# Patient Record
Sex: FEMALE | Race: BLACK OR AFRICAN AMERICAN | NOT HISPANIC OR LATINO | Employment: FULL TIME | URBAN - METROPOLITAN AREA
[De-identification: names, ages, dates, MRNs, and addresses within clinical notes are randomized per-mention and may not be internally consistent; named-entity substitution may affect disease eponyms.]

---

## 2018-08-22 ENCOUNTER — TELEPHONE (OUTPATIENT)
Dept: SURGERY | Facility: CLINIC | Age: 63
End: 2018-08-22

## 2018-08-22 NOTE — TELEPHONE ENCOUNTER
Patient called regarding a second opinion for her breast cancer. Patient explains that she was recently diagnosed because she went to the ER with kidney stones, and her CT showed a breast mass. Subsequently went to [facility] for imaging and biopsy. She was found to have HER2 positive breast cancer. She states she met with a surgeon who told her she needed chemotherapy upfront. Patient does not want this. She states she is interested in hearing any other options available to her, including bilateral mastectomies up front. She is requesting Dr. Avendaño.     Scheduled patient with Dr. Avendaño for next Tuesday. She states she has her path report and her images on disc. She will bring them to the appt. Gave her my direct number and reviewed the location of the breast center. Patient verbalized understanding of all information

## 2018-08-28 ENCOUNTER — OFFICE VISIT (OUTPATIENT)
Dept: SURGERY | Facility: CLINIC | Age: 63
End: 2018-08-28
Payer: COMMERCIAL

## 2018-08-28 ENCOUNTER — DOCUMENTATION ONLY (OUTPATIENT)
Dept: SURGERY | Facility: CLINIC | Age: 63
End: 2018-08-28

## 2018-08-28 VITALS
BODY MASS INDEX: 39.99 KG/M2 | SYSTOLIC BLOOD PRESSURE: 156 MMHG | WEIGHT: 248.81 LBS | DIASTOLIC BLOOD PRESSURE: 92 MMHG | HEART RATE: 73 BPM | TEMPERATURE: 98 F | HEIGHT: 66 IN

## 2018-08-28 DIAGNOSIS — C50.812 MALIGNANT NEOPLASM OF OVERLAPPING SITES OF LEFT BREAST IN FEMALE, ESTROGEN RECEPTOR POSITIVE: Primary | ICD-10-CM

## 2018-08-28 DIAGNOSIS — C50.512 MALIGNANT NEOPLASM OF LOWER-OUTER QUADRANT OF LEFT BREAST OF FEMALE, ESTROGEN RECEPTOR POSITIVE: ICD-10-CM

## 2018-08-28 DIAGNOSIS — Z17.0 MALIGNANT NEOPLASM OF OVERLAPPING SITES OF LEFT BREAST IN FEMALE, ESTROGEN RECEPTOR POSITIVE: Primary | ICD-10-CM

## 2018-08-28 DIAGNOSIS — Z17.0 MALIGNANT NEOPLASM OF LOWER-OUTER QUADRANT OF LEFT BREAST OF FEMALE, ESTROGEN RECEPTOR POSITIVE: ICD-10-CM

## 2018-08-28 PROCEDURE — 99205 OFFICE O/P NEW HI 60 MIN: CPT | Mod: S$GLB,,, | Performed by: SURGERY

## 2018-08-28 PROCEDURE — 3008F BODY MASS INDEX DOCD: CPT | Mod: S$GLB,,, | Performed by: SURGERY

## 2018-08-28 PROCEDURE — 99999 PR PBB SHADOW E&M-EST. PATIENT-LVL III: CPT | Mod: PBBFAC,,, | Performed by: SURGERY

## 2018-08-28 NOTE — PROGRESS NOTES
GENERAL SURGERY CLINIC  HISTORY AND PHYSICAL    CC:  Left breast cancer     HPI:  Beatriz Faust is a 63 y.o.  female with no significant PMHx who presents to clinic for evaluation of newly diagnosed Left breast cancer.  She has not had a MMG in ~15 years.  She presented to the ED in early July for abdominal pain and a breast mass was incidentally found on CT scan.  She subsequently underwent a MMG/US and core needle bx which came back as high grade invasive ductal carcinoma ER/WV/Her2 +.  She was originally seen at [facility] where she was recommended to have neoadjuvant chemotherapy.  She presents today for a second opinion     No known family hx of breast cancer    Non-smoker, non-drinker    Gyn Hx:  Menarche 14, menopause ~47,       ROS:  A 10-point review of systems is negative except for the above mentioned in the HPI.     No past medical history on file.    No past surgical history on file.    Social History     Socioeconomic History    Marital status: Single     Spouse name: Not on file    Number of children: Not on file    Years of education: Not on file    Highest education level: Not on file   Social Needs    Financial resource strain: Not on file    Food insecurity - worry: Not on file    Food insecurity - inability: Not on file    Transportation needs - medical: Not on file    Transportation needs - non-medical: Not on file   Occupational History    Not on file   Tobacco Use    Smoking status: Never Smoker   Substance and Sexual Activity    Alcohol use: Not on file    Drug use: Not on file    Sexual activity: Not on file   Other Topics Concern    Not on file   Social History Narrative    Not on file       Review of patient's allergies indicates:  No Known Allergies      PHYSICAL EXAM:  Vitals:    18 1138   BP: (!) 156/92   Pulse: 73   Temp: 97.9 °F (36.6 °C)       General: NAD  Neuro: AAOx3  Cardio: RRR  Resp: Breathing even and unlabored  Abd: Soft, ND, NT, no  palpable mass, BS+  Ext: Warm and well perfused  Breast:  Well healing biopsy site on the Left.  No palpable masses on Right.  No LAD.        PATH  High grade invasive ductal carcinoma ER/MT+, Her2+      PERTINENT IMAGING:  Reviewed:  ~2x2.5 cm mass at 5:00 of Left breast ~5cm from nipple       ASSESSMENT/PLAN:  Beatriz Faust is a 63 y.o. female with triple positive Left breast cancer    - Patient is reluctant to undergo chemotherapy or radiation   - Would prefer bilateral mastectomy with reconstruction with plastic surgery       Kevon Hurd M.D.  General Surgery PGY3  [phone]  I have personally taken the history and examined this patient and agree with the resident's note as stated above.  Beatriz Faust presents for a newly diagnosed left breast cancer.    HISTORY OF PRESENT ILLNESS:  The patient presents with her sister Benjamin.    Initial workup was done at [facility] when a 2.5 cm mass   was seen on CT scan in the left breast lower outer 5 o'clock region.  Diagnostic   imaging revealed a 19 x 25 mm mass in the left breast.  Her last mammogram   prior to this was at [facility] in [place] approximately 16 years ago.  The core needle   biopsy was performed, which revealed a triple positive estrogen, positive   progesterone receptor positive, HER-2/fariba positive breast cancer.  She presents   today for second opinion.  She was counseled at [facility]   about neoadjuvant chemotherapy, which is certainly an option.  The patient   states that she does not want radiotherapy if she can avoid it.  She states she   does not want chemotherapy.  She states off hand that she wants a bilateral   mastectomy in order to not get chemotherapy.  We then went through an extensive   rationale as to why the choice of breast conservation surgery versus mastectomy   has no bearing on the influence of recommending chemotherapy either   neoadjuvantly or adjuvantly.  We discussed that the  recommendation for   chemotherapy would be that she has a high-risk tumor that is HER-2/fariba positive   and not based on what type of local surgery she had.  After extensive discussion   about this, they understand this process.  She states she is okay with hormonal   therapy and after the discussion, she is still hoping to avoid radiotherapy.    We did offer many options.  Since she has a 46 quadruple D breast, certainly it   would be difficult to perform lumpectomy and radiotherapy and offer a good   cosmetic result in terms of the radiotherapy because of the size of her breast.    We did offer her the option of left breast conservation surgery with bilateral   breast reductions done simultaneously.  This would likely allow this to achieve   an excellent surgically clear margin and avoid potential second surgery, which   the patient is trying to avoid, but would mandate radiotherapy.  If we did this   in the upfront setting, we will also place a contralateral right-sided port for   anticipated adjuvant Herceptin-based chemotherapy.  We discussed some of the   rationale now for adjuvant versus neoadjuvant chemotherapy with her two positive   cancers at our institution because of the APHINITY trial, which now allows us   to use Perjeta in the adjuvant setting.  We have been going to upfront surgery   if the tumor is estimated to be less than 3 cm because if she is node negative,   she would potentially end up receiving less chemotherapy and potential less side   effects rather than upfront approach where she would be given a more aggressive   regimen.  At this point, she may still be contemplating bilateral mastectomies,   but she is strongly now beginning to consider possible options of left breast   conservation surgery and radiotherapy with simultaneous reductions.  If this is   done, certainly another way to optimize surgically clear margins and avoid an   additional surgery would be to proceed with the  neoadjuvant approach.  She   states she did not have a left axillary ultrasound at [facility].  We will order a left axillary ultrasound.  I will also order a breast   MRI to assess the extent of the mass and if greater than 3 cm, certainly we   would lean and push her towards neoadjuvant Herceptin and Perjeta-based   chemotherapy approach.  I will order a PET scan at the patient's request, but   this will likely be denied since she has clinically negative axilla and a T2   tumor.    We have the outside imaging reviewed and there are indeterminate calcifications   associated with the left breast and the patient is scheduled for her left   axillary ultrasound on Tuesday 09/04/2018 and we will add a repeat left   diagnostic mammogram with possible additional views as well as a left breast   ultrasound to the existing left axillary ultrasound order.  The patient is still   contemplating therapeutic options, but seems to have had a good understanding   of the rationale for breast conservation surgery versus mastectomy and when   chemotherapy is indicated and when we would use it neoadjuvantly, we will await   final assessment with repeat imaging with MRI, repeat left diagnostic mammogram   and left breast and axillary ultrasound before making a final decision regarding   therapeutic approach.    Time spent with the patient and her sister today on 08/28/2018 at the time of   initial consultation was greater than 60 minutes with greater than 50% of that   time in counseling.    Again, if the MRI shows a larger area, neoadjuvant approach and downsizing to   optimize ability to do breast conservation surgery along with breast reduction   and radiotherapy would be an option to consider.      RLC/HN  dd: 09/02/2018 08:47:35 (CDT)  td: 09/02/2018 13:26:20 (CDT)  Doc ID   #[id number]  Job ID #[id number]    CC:     Job # [id number]

## 2018-08-28 NOTE — LETTER
[facility]  [address]  [facility] [address]  Phone: [phone]  Fax: [phone] September 2, 2018      Rey Roque MD  [address] [place] [id number]    Patient: Beatriz Faust   MR Number: [id number]   YOB: 1955   Date of Visit: 8/28/2018     Dear Dr. Roque:    Thank you for referring Beatriz Faust to me for evaluation. Attached you will find relevant portions of my assessment and plan of care.    The patient presents with her sister Benjamin.  Initial workup was done at [facility] when a 2.5 cm mass was seen on CT scan in the left breast lower outer 5 o'clock region.  Diagnostic imaging revealed a 19 x 25 mm mass in the left breast.  Her last mammogram prior to this was at [facility] in [place] approximately 16 years ago.  The core needle biopsy was performed, which revealed a triple positive estrogen, positive progesterone receptor positive, HER-2/fariba positive breast cancer.      She presents today for second opinion.  She was counseled at [facility] about neoadjuvant chemotherapy, which is certainly an option.  The patient states that she does not want radiotherapy if she can avoid it.  She states she does not want chemotherapy.  She states off hand that she wants a bilateral mastectomy in order to not get chemotherapy.  We then went through an extensive rationale as to why the choice of breast conservation surgery versus mastectomy has no bearing on the influence of recommending chemotherapy either neoadjuvantly or adjuvantly.  We discussed that the recommendation for chemotherapy would be that she has a high-risk tumor that is HER-2/fariba positive and not based on what type of local surgery she had.  After extensive discussion about this, they understand this process.  She states she is okay with hormonal therapy and after the discussion, she is still hoping to avoid radiotherapy.      We did offer many options.   Since she has a 46 quadruple D breast, certainly it would be difficult to perform lumpectomy and radiotherapy and offer a good cosmetic result in terms of the radiotherapy because of the size of her breast.  We did offer her the option of left breast conservation surgery with bilateral breast reductions done simultaneously.  This would likely allow this to achieve an excellent surgically clear margin and avoid potential second surgery, which the patient is trying to avoid, but would mandate radiotherapy.  If we did this in the upfront setting, we will also place a contralateral right-sided port for anticipated adjuvant Herceptin-based chemotherapy.  We discussed some of the rationale now for adjuvant versus neoadjuvant chemotherapy with her two positive cancers at our institution because of the APHINITY trial, which now allows us to use Perjeta in the adjuvant setting.  We have been going to upfront surgery if the tumor is estimated to be less than 3 cm because if she is node negative, she would potentially end up receiving less chemotherapy and potential less side effects rather than upfront approach where she would be given a more aggressive regimen.      At this point, she may still be contemplating bilateral mastectomies, but she is strongly now beginning to consider possible options of left breast conservation surgery and radiotherapy with simultaneous reductions. If this is done, certainly another way to optimize surgically clear margins and avoid an additional surgery would be to proceed with the neoadjuvant approach.  She states she did not have a left axillary ultrasound at [facility].  We will order a left axillary ultrasound.  I will also order a breast MRI to assess the extent of the mass and if greater than 3 cm, certainly we would lean and push her towards neoadjuvant Herceptin and Perjeta-based chemotherapy approach.  I will order a PET scan at the patient's request, but this will  likely be denied since she has clinically negative axilla and a T2 tumor.    We have the outside imaging reviewed and there are indeterminate calcifications associated with the left breast and the patient is scheduled for her left axillary ultrasound on Tuesday 09/04/2018 and we will add a repeat left diagnostic mammogram with possible additional views as well as a left breast ultrasound to the existing left axillary ultrasound order.  The patient is still contemplating therapeutic options, but seems to have had a good understanding of the rationale for breast conservation surgery versus mastectomy and when chemotherapy is indicated and when we would use it neoadjuvantly, we will await final assessment with repeat imaging with MRI, repeat left diagnostic mammogram and left breast and axillary ultrasound before making a final decision regarding therapeutic approach.    Time spent with the patient and her sister today on 08/28/2018 at the time of initial consultation was greater than 60 minutes with greater than 50% of that iime in counseling.    Again, if the MRI shows a larger area, neoadjuvant approach and downsizing to optimize ability to do breast conservation surgery along with breast reduction and radiotherapy would be an option to consider.    If you have questions, please do not hesitate to call me. I look forward to following Beatriz Faust along with you.    Sincerely,    Triston Avendaño MD  Medical Director, [facility]  Staff Attending Surgeon - Department of Surgery  [facility]  Associate Professor of Surgery  [employer]  [facility] [employer]    RLC/hcr

## 2018-08-28 NOTE — Clinical Note
September 2, 2018      Rey Roque MD  [address] [place] [id number]           [facility]  [address]  [facility] [address]  Phone: [phone]  Fax: [phone]          Patient: Beatriz Faust   MR Number: [id number]   YOB: 1955   Date of Visit: 8/28/2018       Dear Dr. Rey Roque:    Thank you for referring Beatriz Faust to me for evaluation. Attached you will find relevant portions of my assessment and plan of care.    If you have questions, please do not hesitate to call me. I look forward to following Beatriz Faust along with you.    Sincerely,    Triston Avendaño MD    Enclosure  CC:  No Recipients    If you would like to receive this communication electronically, please contact [email or web address] or [phone] to request more information on [employer] access.    For providers and/or their staff who would like to refer a patient to [facility], please contact us through our one-stop-shop provider referral line, [facility], at [phone].    If you feel you have received this communication in error or would no longer like to receive these types of communications, please e-mail [email or web address]

## 2018-08-29 ENCOUNTER — HOSPITAL ENCOUNTER (OUTPATIENT)
Dept: RADIOLOGY | Facility: HOSPITAL | Age: 63
Discharge: HOME | End: 2018-08-29
Attending: SURGERY

## 2018-08-31 NOTE — PROGRESS NOTES
Nurse Navigator Note:     Met with patient during her consult with Dr. Avendaño. Patient and I reviewed the information she discussed with Dr. Avendaño, including treatment options, diagnosis, and future plans for workup. Patient and I went through the new patient binder, explained some of the information and why it is provided.     Also offered patient consults with our other specialty clinics: Dr. Stokes for gynecological health during treatment, Charis Rojo for physical therapy evaluation, Dr. Schreiber for psychological support, and Yamel Dey for nutritional counseling. Explained to patient that all of these support services are completely optional. Discussed that physical therapy is the only service that is recommended pre-op specifically, everything else can be requested at a later time. Patient was given a copy of Dr. Avendaño's card and my card. Encouraged her to call me if she has any questions or concerns or would like to schedule any additional appointments. Verbalized understanding of all information.

## 2018-09-02 PROBLEM — C50.812 MALIGNANT NEOPLASM OF OVERLAPPING SITES OF LEFT BREAST IN FEMALE, ESTROGEN RECEPTOR POSITIVE: Status: ACTIVE | Noted: 2018-09-02

## 2018-09-02 PROBLEM — Z17.0 MALIGNANT NEOPLASM OF OVERLAPPING SITES OF LEFT BREAST IN FEMALE, ESTROGEN RECEPTOR POSITIVE: Status: ACTIVE | Noted: 2018-09-02

## 2018-09-04 ENCOUNTER — HOSPITAL ENCOUNTER (OUTPATIENT)
Dept: RADIOLOGY | Facility: HOSPITAL | Age: 63
Discharge: HOME | End: 2018-09-04
Attending: SURGERY
Payer: COMMERCIAL

## 2018-09-04 ENCOUNTER — TELEPHONE (OUTPATIENT)
Dept: SURGERY | Facility: CLINIC | Age: 63
End: 2018-09-04

## 2018-09-04 VITALS — HEIGHT: 66 IN | BODY MASS INDEX: 39.86 KG/M2 | WEIGHT: 248 LBS

## 2018-09-04 DIAGNOSIS — C50.812 MALIGNANT NEOPLASM OF OVERLAPPING SITES OF LEFT BREAST IN FEMALE, ESTROGEN RECEPTOR POSITIVE: Primary | ICD-10-CM

## 2018-09-04 DIAGNOSIS — Z17.0 MALIGNANT NEOPLASM OF OVERLAPPING SITES OF LEFT BREAST IN FEMALE, ESTROGEN RECEPTOR POSITIVE: Primary | ICD-10-CM

## 2018-09-04 DIAGNOSIS — C50.812 MALIGNANT NEOPLASM OF OVERLAPPING SITES OF LEFT BREAST IN FEMALE, ESTROGEN RECEPTOR POSITIVE: ICD-10-CM

## 2018-09-04 DIAGNOSIS — Z17.0 MALIGNANT NEOPLASM OF OVERLAPPING SITES OF LEFT BREAST IN FEMALE, ESTROGEN RECEPTOR POSITIVE: ICD-10-CM

## 2018-09-04 LAB
CREAT SERPL-MCNC: 0.8 MG/DL (ref 0.5–1.4)
SAMPLE: NORMAL

## 2018-09-04 PROCEDURE — 77059 MRI BREAST BILATERAL W W/O CONTRAST: CPT | Mod: 26,,, | Performed by: RADIOLOGY

## 2018-09-04 PROCEDURE — 77065 DX MAMMO INCL CAD UNI: CPT | Mod: TC,PO,LT

## 2018-09-04 PROCEDURE — 25500020 PHARM REV CODE 255: Performed by: SURGERY

## 2018-09-04 PROCEDURE — 77061 BREAST TOMOSYNTHESIS UNI: CPT | Mod: 26,LT,, | Performed by: RADIOLOGY

## 2018-09-04 PROCEDURE — 77059 MRI BREAST BILATERAL W W/O CONTRAST: CPT | Mod: TC

## 2018-09-04 PROCEDURE — A9577 INJ MULTIHANCE: HCPCS | Performed by: SURGERY

## 2018-09-04 PROCEDURE — 77061 BREAST TOMOSYNTHESIS UNI: CPT | Mod: TC,PO,LT

## 2018-09-04 PROCEDURE — 77065 DX MAMMO INCL CAD UNI: CPT | Mod: 26,LT,, | Performed by: RADIOLOGY

## 2018-09-04 RX ADMIN — GADOBENATE DIMEGLUMINE 20 ML: 529 INJECTION, SOLUTION INTRAVENOUS at 05:09

## 2018-09-04 NOTE — TELEPHONE ENCOUNTER
Called pt, added MMG to appointment today, no answer left message for pt to arrive for 215 today at [facility]

## 2018-09-06 PROCEDURE — 88321 CONSLTJ&REPRT SLD PREP ELSWR: CPT | Mod: ,,, | Performed by: PATHOLOGY

## 2018-09-08 ENCOUNTER — HOSPITAL ENCOUNTER (OUTPATIENT)
Dept: RADIOLOGY | Facility: HOSPITAL | Age: 63
Discharge: HOME | End: 2018-09-08
Attending: SURGERY
Payer: COMMERCIAL

## 2018-09-08 DIAGNOSIS — C50.812 MALIGNANT NEOPLASM OF OVERLAPPING SITES OF LEFT BREAST IN FEMALE, ESTROGEN RECEPTOR POSITIVE: ICD-10-CM

## 2018-09-08 DIAGNOSIS — Z17.0 MALIGNANT NEOPLASM OF OVERLAPPING SITES OF LEFT BREAST IN FEMALE, ESTROGEN RECEPTOR POSITIVE: ICD-10-CM

## 2018-09-08 PROCEDURE — 78815 PET IMAGE W/CT SKULL-THIGH: CPT | Mod: 26,PI,, | Performed by: RADIOLOGY

## 2018-09-08 PROCEDURE — 78815 PET IMAGE W/CT SKULL-THIGH: CPT | Mod: TC

## 2018-09-08 PROCEDURE — A9552 F18 FDG: HCPCS

## 2018-09-10 LAB — POCT GLUCOSE: 108 MG/DL (ref 70–110)

## 2018-09-13 ENCOUNTER — TELEPHONE (OUTPATIENT)
Dept: SURGERY | Facility: CLINIC | Age: 63
End: 2018-09-13

## 2018-09-13 NOTE — TELEPHONE ENCOUNTER
Called patient to discuss surgical options for Dr. Avendaño. Left voicemail with my callback number requesting return call at earliest convenience

## 2018-09-17 ENCOUNTER — TELEPHONE (OUTPATIENT)
Dept: SURGERY | Facility: CLINIC | Age: 63
End: 2018-09-17

## 2018-09-17 ENCOUNTER — PATIENT MESSAGE (OUTPATIENT)
Dept: SURGERY | Facility: CLINIC | Age: 63
End: 2018-09-17

## 2018-09-17 NOTE — TELEPHONE ENCOUNTER
Patient returned my call, we discussed her surgical decision. Patient has decided to go with lumpectomy with bilateral reduction. She will need a consult with Dr. Smith. Scheduled for Wednesday with Dr. Smith, she is currently scheduled to see dr. Avendaño Thursday. Patient will check with her family and let me know if she wants to reschedule anything.

## 2018-09-17 NOTE — TELEPHONE ENCOUNTER
Called patient to discuss surgery decision making. Left voicemail with my callback number requesting return call at earliest convenience. Will also send portal message asking for callback

## 2018-09-19 ENCOUNTER — OFFICE VISIT (OUTPATIENT)
Dept: PLASTIC SURGERY | Facility: CLINIC | Age: 63
End: 2018-09-19
Payer: COMMERCIAL

## 2018-09-19 VITALS
HEIGHT: 66 IN | BODY MASS INDEX: 39.22 KG/M2 | WEIGHT: 244.06 LBS | DIASTOLIC BLOOD PRESSURE: 95 MMHG | TEMPERATURE: 98 F | SYSTOLIC BLOOD PRESSURE: 170 MMHG | HEART RATE: 64 BPM

## 2018-09-19 DIAGNOSIS — C50.812 MALIGNANT NEOPLASM OF OVERLAPPING SITES OF LEFT BREAST IN FEMALE, ESTROGEN RECEPTOR POSITIVE: Primary | ICD-10-CM

## 2018-09-19 DIAGNOSIS — Z17.0 MALIGNANT NEOPLASM OF OVERLAPPING SITES OF LEFT BREAST IN FEMALE, ESTROGEN RECEPTOR POSITIVE: Primary | ICD-10-CM

## 2018-09-19 PROCEDURE — 3008F BODY MASS INDEX DOCD: CPT | Mod: S$GLB,,, | Performed by: SURGERY

## 2018-09-19 PROCEDURE — 99203 OFFICE O/P NEW LOW 30 MIN: CPT | Mod: S$GLB,,, | Performed by: SURGERY

## 2018-09-19 PROCEDURE — 99999 PR PBB SHADOW E&M-EST. PATIENT-LVL III: CPT | Mod: PBBFAC,,, | Performed by: SURGERY

## 2018-09-19 NOTE — PROGRESS NOTES
Beatriz Faust presents to Plastic Surgery Clinic upon referral from Dr. Triston Avendaño for breast reconstruction.  The patient was contemplating oncologic   breast reduction for a left breast cancer.  We had a long discussion regarding   options.  The patient stated that she is no longer considering an oncologic   reduction, but would like bilateral mastectomies.  I went through the   reconstructive process with her in detail discussing with her whether or not   reconstruction will be performed at all depending on the thickness and viability   of the skin flaps.  I discussed with her possible prepectoral placement of a   direct implant and tissue expanders, although I do not think tissue expanders   will be necessary, she is a very large breasted woman.  I also discussed with   her that if the flaps looked questionable that she would have a subpectoral   placement of implants.  I also discussed with her the placement of AlloDerm.  I   think that she is a good candidate since she is undergoing a bilateral   mastectomy.  I brought up and offered her a consultation with my cosurgeon here.    She declined.  She stated she wants as minimal surgery as possible.  So we   will go ahead and arrange with Dr. Avendaño to have a bilateral mastectomy   followed by a direct implant placement if possible.      ANGELA/VENANCIO  dd: 09/19/2018 17:38:43 (CDT)  td: 09/20/2018 13:05:32 (CDT)  Doc ID   #[id number]  Job ID #[id number]    CC:

## 2018-09-19 NOTE — LETTER
September 20, 2018      Triston Avendaño MD  [address]  [facility] [address]           [facility]  [address]  [facility] [address]  Phone: [phone]  Fax: [phone]          Patient: Beatriz Faust   MR Number: [id number]   YOB: 1955   Date of Visit: 9/19/2018       Dear Dr. Triston Avendaño:    Thank you for referring Beatriz Faust to me for evaluation. Attached you will find relevant portions of my assessment and plan of care.    If you have questions, please do not hesitate to call me. I look forward to following Beatriz Faust along with you.    Sincerely,    Jatin Smith MD

## 2018-09-24 ENCOUNTER — TELEPHONE (OUTPATIENT)
Dept: SURGERY | Facility: CLINIC | Age: 63
End: 2018-09-24

## 2018-09-24 DIAGNOSIS — C50.912 MALIGNANT NEOPLASM OF LEFT BREAST IN FEMALE, ESTROGEN RECEPTOR POSITIVE, UNSPECIFIED SITE OF BREAST: Primary | ICD-10-CM

## 2018-09-24 DIAGNOSIS — Z01.818 PRE-OP EVALUATION: Primary | ICD-10-CM

## 2018-09-24 DIAGNOSIS — Z17.0 MALIGNANT NEOPLASM OF LEFT BREAST IN FEMALE, ESTROGEN RECEPTOR POSITIVE, UNSPECIFIED SITE OF BREAST: Primary | ICD-10-CM

## 2018-09-24 NOTE — TELEPHONE ENCOUNTER
Called patient to confirm surgery date of 10/29. Explained that this was the only date in October that both Dr. Smith and Dr. Avendaño could make. Patient is agreeable with this. Reviewed that we will call her on the Friday before with a time to arrive, and that Dr. Smith's office will be calling her with their pre-op information

## 2018-10-11 ENCOUNTER — TELEPHONE (OUTPATIENT)
Dept: SURGERY | Facility: CLINIC | Age: 63
End: 2018-10-11

## 2018-10-11 NOTE — TELEPHONE ENCOUNTER
Patient called, requesting a follow up with Dr. Avendaño and Dr. Smith before surgery, just to confirm everyone is on the same page. Patient states that she is ok coming two separate days if necessary. Scheduled with Dr. Avendaño on Tuesday the 16th and Dr. Smith on Wednesday the 17th. Verbalized understanding of all appt information

## 2018-10-16 ENCOUNTER — OFFICE VISIT (OUTPATIENT)
Dept: SURGERY | Facility: CLINIC | Age: 63
End: 2018-10-16
Payer: COMMERCIAL

## 2018-10-16 VITALS
TEMPERATURE: 98 F | WEIGHT: 238 LBS | HEIGHT: 66 IN | SYSTOLIC BLOOD PRESSURE: 151 MMHG | DIASTOLIC BLOOD PRESSURE: 79 MMHG | BODY MASS INDEX: 38.25 KG/M2 | HEART RATE: 58 BPM

## 2018-10-16 DIAGNOSIS — Z17.0 MALIGNANT NEOPLASM OF OVERLAPPING SITES OF LEFT BREAST IN FEMALE, ESTROGEN RECEPTOR POSITIVE: Primary | ICD-10-CM

## 2018-10-16 DIAGNOSIS — C50.812 MALIGNANT NEOPLASM OF OVERLAPPING SITES OF LEFT BREAST IN FEMALE, ESTROGEN RECEPTOR POSITIVE: Primary | ICD-10-CM

## 2018-10-16 PROCEDURE — 99999 PR PBB SHADOW E&M-EST. PATIENT-LVL III: CPT | Mod: PBBFAC,,, | Performed by: SURGERY

## 2018-10-16 PROCEDURE — 99214 OFFICE O/P EST MOD 30 MIN: CPT | Mod: S$GLB,,, | Performed by: SURGERY

## 2018-10-16 PROCEDURE — 3008F BODY MASS INDEX DOCD: CPT | Mod: S$GLB,,, | Performed by: SURGERY

## 2018-10-16 NOTE — H&P (VIEW-ONLY)
GENERAL SURGERY CLINIC  HISTORY AND PHYSICAL    CC:  Left breast cancer     Interval History:  She presents today after MRI which showed the tumor size to be 2.5 cm, a PET which did not show additional hypermetabolic activity other than in the known tumor, and after seeing Sarah in clinic to talk about surgical options.    HPI:  Beatriz Faust is a 63 y.o.  female with no significant PMHx who presents to clinic for evaluation of newly diagnosed Left breast cancer.  She has not had a MMG in ~15 years.  She presented to the ED in early July for abdominal pain and a breast mass was incidentally found on CT scan.  She subsequently underwent a MMG/US and core needle bx which came back as high grade invasive ductal carcinoma ER/[place]/Her2 +.  She was originally seen at [facility] where she was recommended to have neoadjuvant chemotherapy.  She presents today for a second opinion     No known family hx of breast cancer    Non-smoker, non-drinker    Gyn Hx:  Menarche 14, menopause ~47,       ROS:  A 10-point review of systems is negative except for the above mentioned in the HPI.     Past Medical History:   Diagnosis Date    Breast cancer 2018       Past Surgical History:   Procedure Laterality Date    BREAST BIOPSY Left 2018       Social History     Socioeconomic History    Marital status: Single     Spouse name: Not on file    Number of children: Not on file    Years of education: Not on file    Highest education level: Not on file   Social Needs    Financial resource strain: Not on file    Food insecurity - worry: Not on file    Food insecurity - inability: Not on file    Transportation needs - medical: Not on file    Transportation needs - non-medical: Not on file   Occupational History    Not on file   Tobacco Use    Smoking status: Never Smoker   Substance and Sexual Activity    Alcohol use: Not on file    Drug use: Not on file    Sexual activity: Not on file   Other Topics Concern     Not on file   Social History Narrative    Not on file       Review of patient's allergies indicates:  No Known Allergies      PHYSICAL EXAM:  Vitals:    10/16/18 0911   BP: (!) 151/79   Pulse: (!) 58   Temp: 98 °F (36.7 °C)       General: NAD  Neuro: AAOx3  Cardio: RRR  Resp: Breathing even and unlabored  Abd: Soft, ND, NT, no palpable mass, BS+  Ext: Warm and well perfused  Breast:  Well healing biopsy site on the Left.  No palpable masses on Right.  No LAD.        PATH  High grade invasive ductal carcinoma ER/SC+, Her2+      PERTINENT IMAGING:  Reviewed:  ~2x2.5 cm mass at 5:00 of Left breast ~5cm from nipple     MRI  Result:   MRI Breast Bilateral W WO Contrast     History:  Patient is 63 y.o. and is seen for malignant neoplasm of overlapping sites of left breast in female, estrogen receptor positive.       Films Compared:  Compared to: 09/04/2018 Mammo Digital Diagnostic Left with Tomosynthesis_CAD, 08/29/2018 Mammo Previous and 08/29/2018 [facility] Mammo Interpretation Of Outside Films     Technique:  A routine breast MRI was performed with a dedicated breast coil. Pre-contrast STIR were acquired. Then, pre and post contrast T1 weighted fat saturated images were acquired and subtracted with MIP reconstruction.20 ml of intravenous gadolinium contrast was administered. The study was reviewed with [employer] software.     Findings:  The breasts have scattered fibroglandular tissue. The background parenchymal enhancement is minimal and symmetric.      Left  There is a 26 mm x 22 mm x 19 mm irregularly shaped, heterogeneous mass with spiculated margins seen in the left breast at 5 o'clock in the middle depth, 6 cm from the nipple, 2.6 cm from the skin, and 13.8 cm from the chest wall. Kinetics initial phase is medium. Delayed phase is persistent.      No additional suspicious enhancement is identified in the left breast.      Right  There is no evidence of suspicious masses, abnormal enhancement, or other abnormal  findings.      No suspicious internal mammary or axillary adenopathy.      Impression:  Left  Mass: Left breast 26 mm x 22 mm x 19 mm mass at the middle 5 o'clock position. Assessment: 6 - Known biopsy, proven malignancy.      Right  There is no MR evidence of malignancy.     BI-RADS Category:   Overall: 6 - Known Biopsy-Proven Malignancy     Recommendation:  Continued breast surgery management.     ASSESSMENT/PLAN:  Beatriz Faust is a 63 y.o. female with triple positive Left breast cancer    - Patient is reluctant to undergo radiation  - has met with Sarah - wants bilateral mastectomy with immediate direct implant reconstruction. We will also plan on a left axillary SLNBx possible axillary LN dissection. We will also plan on a right possible left side portacath (she will need chemo for Her2+ post operatively)  - we had a very lengthy discussion regarding the risks of the operation and the need for adjuvant chemo considering Her2+    I have personally taken the history and examined this patient and agree with the resident's note as stated above.  Consented and scheduled for a B SSM with L SLNB, possible L ALND with R side portacath insertion.  Plastics feels breasts may be too large for NSM.  Pt is OK with proceeding with SSM likely using vertical radial lollipop (ice cream cone) incisions.  Plastics reconstruction per Dr. Smith  Consented and scheduled for surgery on Mon 10-29-18.

## 2018-10-16 NOTE — LETTER
[facility]  [address]  [facility] [address]  Phone: [phone]  Fax: [phone] October 24, 2018      Rey Roque MD  [address] [place] [id number]    Patient: Beatriz Faust   MR Number: [id number]   YOB: 1955   Date of Visit: 10/16/2018     Dear Dr. Roque:    Thank you for referring Beatriz Faust to me for evaluation. Attached you will find relevant portions of my assessment and plan of care.    Consented and scheduled for a Bilateral SSM with left SLNB, possible left ALND with right side portacath insertion.  Plastics feels breasts may be too large for NSM.    Patient is OK with proceeding with SSM likely using vertical radial lollipop (ice cream cone) incisions.  Plastics reconstruction per Dr. Smith  Consented and scheduled for surgery on Mon 10-29-18.    If you have questions, please do not hesitate to call me. I look forward to following Beatriz Faust along with you.    Sincerely,    Triston Avendaño MD  Medical Director, [facility]  Staff Attending Surgeon - Department of Surgery  [facility]  Associate Professor of Surgery  [employer]  [employer]    RLC/hcr

## 2018-10-16 NOTE — PROGRESS NOTES
GENERAL SURGERY CLINIC  HISTORY AND PHYSICAL    CC:  Left breast cancer     Interval History:  She presents today after MRI which showed the tumor size to be 2.5 cm, a PET which did not show additional hypermetabolic activity other than in the known tumor, and after seeing Sarah in clinic to talk about surgical options.    HPI:  Beatriz Faust is a 63 y.o.  female with no significant PMHx who presents to clinic for evaluation of newly diagnosed Left breast cancer.  She has not had a MMG in ~15 years.  She presented to the ED in early July for abdominal pain and a breast mass was incidentally found on CT scan.  She subsequently underwent a MMG/US and core needle bx which came back as high grade invasive ductal carcinoma ER/MD/Her2 +.  She was originally seen at [facility] where she was recommended to have neoadjuvant chemotherapy.  She presents today for a second opinion     No known family hx of breast cancer    Non-smoker, non-drinker    Gyn Hx:  Menarche 14, menopause ~47,       ROS:  A 10-point review of systems is negative except for the above mentioned in the HPI.     Past Medical History:   Diagnosis Date    Breast cancer 2018       Past Surgical History:   Procedure Laterality Date    BREAST BIOPSY Left 2018       Social History     Socioeconomic History    Marital status: Single     Spouse name: Not on file    Number of children: Not on file    Years of education: Not on file    Highest education level: Not on file   Social Needs    Financial resource strain: Not on file    Food insecurity - worry: Not on file    Food insecurity - inability: Not on file    Transportation needs - medical: Not on file    Transportation needs - non-medical: Not on file   Occupational History    Not on file   Tobacco Use    Smoking status: Never Smoker   Substance and Sexual Activity    Alcohol use: Not on file    Drug use: Not on file    Sexual activity: Not on file   Other Topics Concern     Not on file   Social History Narrative    Not on file       Review of patient's allergies indicates:  No Known Allergies      PHYSICAL EXAM:  Vitals:    10/16/18 0911   BP: (!) 151/79   Pulse: (!) 58   Temp: 98 °F (36.7 °C)       General: NAD  Neuro: AAOx3  Cardio: RRR  Resp: Breathing even and unlabored  Abd: Soft, ND, NT, no palpable mass, BS+  Ext: Warm and well perfused  Breast:  Well healing biopsy site on the Left.  No palpable masses on Right.  No LAD.        PATH  High grade invasive ductal carcinoma ER/NV+, Her2+      PERTINENT IMAGING:  Reviewed:  ~2x2.5 cm mass at 5:00 of Left breast ~5cm from nipple     MRI  Result:   MRI Breast Bilateral W WO Contrast     History:  Patient is 63 y.o. and is seen for malignant neoplasm of overlapping sites of left breast in female, estrogen receptor positive.       Films Compared:  Compared to: 09/04/2018 Mammo Digital Diagnostic Left with Tomosynthesis_CAD, 08/29/2018 Mammo Previous and 08/29/2018 [facility] Mammo Interpretation Of Outside Films     Technique:  A routine breast MRI was performed with a dedicated breast coil. Pre-contrast STIR were acquired. Then, pre and post contrast T1 weighted fat saturated images were acquired and subtracted with MIP reconstruction.20 ml of intravenous gadolinium contrast was administered. The study was reviewed with [employer] software.     Findings:  The breasts have scattered fibroglandular tissue. The background parenchymal enhancement is minimal and symmetric.      Left  There is a 26 mm x 22 mm x 19 mm irregularly shaped, heterogeneous mass with spiculated margins seen in the left breast at 5 o'clock in the middle depth, 6 cm from the nipple, 2.6 cm from the skin, and 13.8 cm from the chest wall. Kinetics initial phase is medium. Delayed phase is persistent.      No additional suspicious enhancement is identified in the left breast.      Right  There is no evidence of suspicious masses, abnormal enhancement, or other abnormal  findings.      No suspicious internal mammary or axillary adenopathy.      Impression:  Left  Mass: Left breast 26 mm x 22 mm x 19 mm mass at the middle 5 o'clock position. Assessment: 6 - Known biopsy, proven malignancy.      Right  There is no MR evidence of malignancy.     BI-RADS Category:   Overall: 6 - Known Biopsy-Proven Malignancy     Recommendation:  Continued breast surgery management.     ASSESSMENT/PLAN:  Beatriz Faust is a 63 y.o. female with triple positive Left breast cancer    - Patient is reluctant to undergo radiation  - has met with Sarah - wants bilateral mastectomy with immediate direct implant reconstruction. We will also plan on a left axillary SLNBx possible axillary LN dissection. We will also plan on a right possible left side portacath (she will need chemo for Her2+ post operatively)  - we had a very lengthy discussion regarding the risks of the operation and the need for adjuvant chemo considering Her2+    I have personally taken the history and examined this patient and agree with the resident's note as stated above.  Consented and scheduled for a B SSM with L SLNB, possible L ALND with R side portacath insertion.  Plastics feels breasts may be too large for NSM.  Pt is OK with proceeding with SSM likely using vertical radial lollipop (ice cream cone) incisions.  Plastics reconstruction per Dr. Smith  Consented and scheduled for surgery on Mon 10-29-18.

## 2018-10-17 ENCOUNTER — OFFICE VISIT (OUTPATIENT)
Dept: PLASTIC SURGERY | Facility: CLINIC | Age: 63
End: 2018-10-17
Payer: COMMERCIAL

## 2018-10-17 VITALS
HEART RATE: 63 BPM | DIASTOLIC BLOOD PRESSURE: 85 MMHG | BODY MASS INDEX: 38.42 KG/M2 | SYSTOLIC BLOOD PRESSURE: 153 MMHG | HEIGHT: 66 IN | TEMPERATURE: 98 F | WEIGHT: 239.06 LBS

## 2018-10-17 DIAGNOSIS — C50.812 MALIGNANT NEOPLASM OF OVERLAPPING SITES OF LEFT BREAST IN FEMALE, ESTROGEN RECEPTOR POSITIVE: Primary | ICD-10-CM

## 2018-10-17 DIAGNOSIS — Z17.0 MALIGNANT NEOPLASM OF OVERLAPPING SITES OF LEFT BREAST IN FEMALE, ESTROGEN RECEPTOR POSITIVE: Primary | ICD-10-CM

## 2018-10-17 PROCEDURE — 99212 OFFICE O/P EST SF 10 MIN: CPT | Mod: S$GLB,,, | Performed by: SURGERY

## 2018-10-17 PROCEDURE — 99999 PR PBB SHADOW E&M-EST. PATIENT-LVL III: CPT | Mod: PBBFAC,,, | Performed by: SURGERY

## 2018-10-17 PROCEDURE — 3008F BODY MASS INDEX DOCD: CPT | Mod: S$GLB,,, | Performed by: SURGERY

## 2018-10-17 NOTE — H&P (VIEW-ONLY)
Pt is a 62 yo female scheduled for bilateral mastectomy with Dr. Avendaño on the 29th followed by immediate reconstruction with likely direct implant placement by Dr. Smith. She wanted to come to clinic today in order to ask more questions about her upcoming surgery. She is very pleasant and said that she just wanted to make sure that she could discuss er questions regarding the incisions that would be used, breast size following surgery and what the overall plan for surgery would be.       Review of patient's allergies indicates:  No Known Allergies    No current outpatient medications on file prior to visit.     No current facility-administered medications on file prior to visit.      Past Medical History:   Diagnosis Date    Breast cancer 07/2018     No family history on file.  Past Surgical History:   Procedure Laterality Date    BREAST BIOPSY Left 07/2018     Social History     Socioeconomic History    Marital status: Single     Spouse name: Not on file    Number of children: Not on file    Years of education: Not on file    Highest education level: Not on file   Social Needs    Financial resource strain: Not on file    Food insecurity - worry: Not on file    Food insecurity - inability: Not on file    Transportation needs - medical: Not on file    Transportation needs - non-medical: Not on file   Occupational History    Not on file   Tobacco Use    Smoking status: Never Smoker   Substance and Sexual Activity    Alcohol use: Not on file    Drug use: Not on file    Sexual activity: Not on file   Other Topics Concern    Not on file   Social History Narrative    Not on file     Physical exam:  Vitals:    10/17/18 1606   BP: (!) 153/85   Pulse: 63   Temp: 98.2 °F (36.8 °C)       NAD  VSS  Normal respiratory effort  Pt has fairly long, large pendulous breasts, nipple everted, no rashes present, no erythema    Assessment: Pt is a 62 yo female scheduled for bilateral mastectomy with Dr. Avendaño  with immediate reconstruction by Dr. Smith on 10/29.     Plan:    Spoke at length with the patient and her sister on the phone answering her questions. Described to the patient that the exact plan for reconstruction would be dependent on the outcome of Dr. Avendaño's preceding surgery. If thick full flaps remain after mastectomy then prepectoral implants would be a good option. If it is felt that the tissue is too thin and at risk for breakdown with prepectoral implant placement then they would be placed sub pectoral. Also discussed that in the rare  event that there was high suspicion that skin tissue would die that there may be no implant placement at all.    Also discussed that it is likely that a larger incision will be needed in order for adequate skin removal given the particular contour of her breast.    Pt wanted to go as small as possible with regard to implant size. Discussed that a full C cup would be as small as she could go.     Lorne Acevedo MD  PGY1

## 2018-10-24 ENCOUNTER — LAB VISIT (OUTPATIENT)
Dept: LAB | Facility: HOSPITAL | Age: 63
End: 2018-10-24
Attending: SURGERY
Payer: COMMERCIAL

## 2018-10-24 DIAGNOSIS — Z01.818 PRE-OP EVALUATION: ICD-10-CM

## 2018-10-24 LAB
ANION GAP SERPL CALC-SCNC: 8 MMOL/L
BASOPHILS # BLD AUTO: 0.07 K/UL
BASOPHILS NFR BLD: 1.1 %
BUN SERPL-MCNC: 17 MG/DL
CALCIUM SERPL-MCNC: 10 MG/DL
CHLORIDE SERPL-SCNC: 106 MMOL/L
CO2 SERPL-SCNC: 27 MMOL/L
CREAT SERPL-MCNC: 0.8 MG/DL
DIFFERENTIAL METHOD: ABNORMAL
EOSINOPHIL # BLD AUTO: 0.1 K/UL
EOSINOPHIL NFR BLD: 2 %
ERYTHROCYTE [DISTWIDTH] IN BLOOD BY AUTOMATED COUNT: 14.5 %
EST. GFR  (AFRICAN AMERICAN): >60 ML/MIN/1.73 M^2
EST. GFR  (NON AFRICAN AMERICAN): >60 ML/MIN/1.73 M^2
GLUCOSE SERPL-MCNC: 89 MG/DL
HCT VFR BLD AUTO: 42.7 %
HGB BLD-MCNC: 13.6 G/DL
IMM GRANULOCYTES # BLD AUTO: 0.01 K/UL
IMM GRANULOCYTES NFR BLD AUTO: 0.2 %
LYMPHOCYTES # BLD AUTO: 1.7 K/UL
LYMPHOCYTES NFR BLD: 28.1 %
MCH RBC QN AUTO: 27.6 PG
MCHC RBC AUTO-ENTMCNC: 31.9 G/DL
MCV RBC AUTO: 87 FL
MONOCYTES # BLD AUTO: 0.3 K/UL
MONOCYTES NFR BLD: 4.8 %
NEUTROPHILS # BLD AUTO: 3.9 K/UL
NEUTROPHILS NFR BLD: 63.8 %
NRBC BLD-RTO: 0 /100 WBC
PLATELET # BLD AUTO: 284 K/UL
PMV BLD AUTO: 11.4 FL
POTASSIUM SERPL-SCNC: 4.4 MMOL/L
RBC # BLD AUTO: 4.93 M/UL
SODIUM SERPL-SCNC: 141 MMOL/L
WBC # BLD AUTO: 6.09 K/UL

## 2018-10-24 PROCEDURE — 85025 COMPLETE CBC W/AUTO DIFF WBC: CPT

## 2018-10-24 PROCEDURE — 36415 COLL VENOUS BLD VENIPUNCTURE: CPT

## 2018-10-24 PROCEDURE — 80048 BASIC METABOLIC PNL TOTAL CA: CPT

## 2018-10-26 ENCOUNTER — TELEPHONE (OUTPATIENT)
Dept: SURGERY | Facility: CLINIC | Age: 63
End: 2018-10-26

## 2018-10-28 ENCOUNTER — ANESTHESIA EVENT (OUTPATIENT)
Dept: SURGERY | Facility: HOSPITAL | Age: 63
DRG: 580 | End: 2018-10-28
Payer: COMMERCIAL

## 2018-10-29 ENCOUNTER — HOSPITAL ENCOUNTER (OUTPATIENT)
Dept: RADIOLOGY | Facility: HOSPITAL | Age: 63
Discharge: HOME | DRG: 580 | End: 2018-10-29
Attending: SURGERY | Admitting: SURGERY
Payer: COMMERCIAL

## 2018-10-29 ENCOUNTER — ANESTHESIA (OUTPATIENT)
Dept: SURGERY | Facility: HOSPITAL | Age: 63
DRG: 580 | End: 2018-10-29
Payer: COMMERCIAL

## 2018-10-29 ENCOUNTER — HOSPITAL ENCOUNTER (INPATIENT)
Facility: HOSPITAL | Age: 63
LOS: 1 days | Discharge: HOME | DRG: 580 | End: 2018-10-30
Attending: SURGERY | Admitting: SURGERY
Payer: COMMERCIAL

## 2018-10-29 DIAGNOSIS — C50.912 MALIGNANT NEOPLASM OF LEFT BREAST IN FEMALE, ESTROGEN RECEPTOR POSITIVE, UNSPECIFIED SITE OF BREAST: ICD-10-CM

## 2018-10-29 DIAGNOSIS — Z17.0 MALIGNANT NEOPLASM OF LEFT BREAST IN FEMALE, ESTROGEN RECEPTOR POSITIVE, UNSPECIFIED SITE OF BREAST: ICD-10-CM

## 2018-10-29 DIAGNOSIS — C50.919 BREAST CANCER: Primary | ICD-10-CM

## 2018-10-29 PROCEDURE — 38900 IO MAP OF SENT LYMPH NODE: CPT | Mod: LT,,, | Performed by: SURGERY

## 2018-10-29 PROCEDURE — C1789 PROSTHESIS, BREAST, IMP: HCPCS | Performed by: SURGERY

## 2018-10-29 PROCEDURE — 63600175 PHARM REV CODE 636 W HCPCS: Performed by: STUDENT IN AN ORGANIZED HEALTH CARE EDUCATION/TRAINING PROGRAM

## 2018-10-29 PROCEDURE — 25000003 PHARM REV CODE 250: Performed by: STUDENT IN AN ORGANIZED HEALTH CARE EDUCATION/TRAINING PROGRAM

## 2018-10-29 PROCEDURE — 37000009 HC ANESTHESIA EA ADD 15 MINS: Performed by: SURGERY

## 2018-10-29 PROCEDURE — A9520 TC99 TILMANOCEPT DIAG 0.5MCI: HCPCS

## 2018-10-29 PROCEDURE — 64461 PVB THORACIC SINGLE INJ SITE: CPT | Mod: 50,59,, | Performed by: ANESTHESIOLOGY

## 2018-10-29 PROCEDURE — 71000039 HC RECOVERY, EACH ADD'L HOUR: Performed by: SURGERY

## 2018-10-29 PROCEDURE — 88360 TUMOR IMMUNOHISTOCHEM/MANUAL: CPT | Mod: 26,,, | Performed by: PATHOLOGY

## 2018-10-29 PROCEDURE — 19303 MAST SIMPLE COMPLETE: CPT | Mod: 59,RT,, | Performed by: SURGERY

## 2018-10-29 PROCEDURE — 36000706: Performed by: SURGERY

## 2018-10-29 PROCEDURE — 0HRV0JZ REPLACEMENT OF BILATERAL BREAST WITH SYNTHETIC SUBSTITUTE, OPEN APPROACH: ICD-10-PCS | Performed by: SURGERY

## 2018-10-29 PROCEDURE — 27100025 HC TUBING, SET FLUID WARMER: Performed by: NURSE ANESTHETIST, CERTIFIED REGISTERED

## 2018-10-29 PROCEDURE — 15777 ACELLULAR DERM MATRIX IMPLT: CPT | Mod: 50,,, | Performed by: SURGERY

## 2018-10-29 PROCEDURE — 88331 PATH CONSLTJ SURG 1 BLK 1SPC: CPT | Performed by: PATHOLOGY

## 2018-10-29 PROCEDURE — 25000003 PHARM REV CODE 250: Performed by: SURGERY

## 2018-10-29 PROCEDURE — 25000003 PHARM REV CODE 250: Performed by: NURSE ANESTHETIST, CERTIFIED REGISTERED

## 2018-10-29 PROCEDURE — 27201423 OPTIME MED/SURG SUP & DEVICES STERILE SUPPLY: Performed by: SURGERY

## 2018-10-29 PROCEDURE — S0077 INJECTION, CLINDAMYCIN PHOSP: HCPCS | Performed by: SURGERY

## 2018-10-29 PROCEDURE — 63600175 PHARM REV CODE 636 W HCPCS: Performed by: SURGERY

## 2018-10-29 PROCEDURE — 88307 TISSUE EXAM BY PATHOLOGIST: CPT | Mod: 26,,, | Performed by: PATHOLOGY

## 2018-10-29 PROCEDURE — 36000707: Performed by: SURGERY

## 2018-10-29 PROCEDURE — 88342 IMHCHEM/IMCYTCHM 1ST ANTB: CPT | Performed by: PATHOLOGY

## 2018-10-29 PROCEDURE — D9220A PRA ANESTHESIA: Mod: ,,, | Performed by: ANESTHESIOLOGY

## 2018-10-29 PROCEDURE — 37000008 HC ANESTHESIA 1ST 15 MINUTES: Performed by: SURGERY

## 2018-10-29 PROCEDURE — C1729 CATH, DRAINAGE: HCPCS | Performed by: SURGERY

## 2018-10-29 PROCEDURE — 19340 INSJ BREAST IMPLT SM D MAST: CPT | Mod: 50,,, | Performed by: SURGERY

## 2018-10-29 PROCEDURE — 19307 MAST MOD RAD: CPT | Mod: LT,,, | Performed by: SURGERY

## 2018-10-29 PROCEDURE — 88331 PATH CONSLTJ SURG 1 BLK 1SPC: CPT | Mod: 26,,, | Performed by: PATHOLOGY

## 2018-10-29 PROCEDURE — C1788 PORT, INDWELLING, IMP: HCPCS | Performed by: SURGERY

## 2018-10-29 PROCEDURE — 07T60ZZ RESECTION OF LEFT AXILLARY LYMPHATIC, OPEN APPROACH: ICD-10-PCS | Performed by: SURGERY

## 2018-10-29 PROCEDURE — 63600175 PHARM REV CODE 636 W HCPCS: Performed by: NURSE ANESTHETIST, CERTIFIED REGISTERED

## 2018-10-29 PROCEDURE — 07B50ZX EXCISION OF RIGHT AXILLARY LYMPHATIC, OPEN APPROACH, DIAGNOSTIC: ICD-10-PCS | Performed by: SURGERY

## 2018-10-29 PROCEDURE — S0077 INJECTION, CLINDAMYCIN PHOSP: HCPCS | Performed by: STUDENT IN AN ORGANIZED HEALTH CARE EDUCATION/TRAINING PROGRAM

## 2018-10-29 PROCEDURE — 71000033 HC RECOVERY, INTIAL HOUR: Performed by: SURGERY

## 2018-10-29 PROCEDURE — 64520 N BLOCK LUMBAR/THORACIC: CPT | Performed by: ANESTHESIOLOGY

## 2018-10-29 DEVICE — KIT POWERPORT SINGLE 8FR: Type: IMPLANTABLE DEVICE | Site: CHEST | Status: FUNCTIONAL

## 2018-10-29 RX ORDER — HEPARIN SODIUM (PORCINE) LOCK FLUSH IV SOLN 100 UNIT/ML 100 UNIT/ML
SOLUTION INTRAVENOUS
Status: DISCONTINUED | OUTPATIENT
Start: 2018-10-29 | End: 2018-10-29 | Stop reason: HOSPADM

## 2018-10-29 RX ORDER — FENTANYL CITRATE 50 UG/ML
25 INJECTION, SOLUTION INTRAMUSCULAR; INTRAVENOUS EVERY 5 MIN PRN
Status: DISCONTINUED | OUTPATIENT
Start: 2018-10-29 | End: 2018-10-29

## 2018-10-29 RX ORDER — ENOXAPARIN SODIUM 100 MG/ML
40 INJECTION SUBCUTANEOUS EVERY 24 HOURS
Status: DISCONTINUED | OUTPATIENT
Start: 2018-10-30 | End: 2018-10-30 | Stop reason: HOSPADM

## 2018-10-29 RX ORDER — HEPARIN SODIUM 5000 [USP'U]/ML
5000 INJECTION, SOLUTION INTRAVENOUS; SUBCUTANEOUS ONCE
Status: COMPLETED | OUTPATIENT
Start: 2018-10-29 | End: 2018-10-29

## 2018-10-29 RX ORDER — CIPROFLOXACIN 2 MG/ML
INJECTION, SOLUTION INTRAVENOUS CONTINUOUS PRN
Status: COMPLETED | OUTPATIENT
Start: 2018-10-29 | End: 2018-10-29

## 2018-10-29 RX ORDER — UBIDECARENONE 30 MG
30 CAPSULE ORAL 3 TIMES DAILY
COMMUNITY

## 2018-10-29 RX ORDER — GLYCOPYRROLATE 0.2 MG/ML
INJECTION INTRAMUSCULAR; INTRAVENOUS
Status: DISCONTINUED | OUTPATIENT
Start: 2018-10-29 | End: 2018-10-29

## 2018-10-29 RX ORDER — HYDROMORPHONE HYDROCHLORIDE 1 MG/ML
0.2 INJECTION, SOLUTION INTRAMUSCULAR; INTRAVENOUS; SUBCUTANEOUS EVERY 5 MIN PRN
Status: DISCONTINUED | OUTPATIENT
Start: 2018-10-29 | End: 2018-10-29 | Stop reason: SDUPTHER

## 2018-10-29 RX ORDER — OXYCODONE HYDROCHLORIDE 10 MG/1
10 TABLET ORAL EVERY 4 HOURS PRN
Status: DISCONTINUED | OUTPATIENT
Start: 2018-10-29 | End: 2018-10-30 | Stop reason: HOSPADM

## 2018-10-29 RX ORDER — HEPARIN SODIUM 5000 [USP'U]/ML
5000 INJECTION, SOLUTION INTRAVENOUS; SUBCUTANEOUS EVERY 8 HOURS
Status: DISCONTINUED | OUTPATIENT
Start: 2018-10-29 | End: 2018-10-29

## 2018-10-29 RX ORDER — CLINDAMYCIN PHOSPHATE 900 MG/50ML
900 INJECTION, SOLUTION INTRAVENOUS
Status: DISCONTINUED | OUTPATIENT
Start: 2018-10-30 | End: 2018-10-29

## 2018-10-29 RX ORDER — CYCLOBENZAPRINE HCL 10 MG
10 TABLET ORAL 3 TIMES DAILY
Status: DISCONTINUED | OUTPATIENT
Start: 2018-10-29 | End: 2018-10-30 | Stop reason: HOSPADM

## 2018-10-29 RX ORDER — MIDAZOLAM HYDROCHLORIDE 1 MG/ML
0.5 INJECTION INTRAMUSCULAR; INTRAVENOUS
Status: DISCONTINUED | OUTPATIENT
Start: 2018-10-29 | End: 2018-10-29

## 2018-10-29 RX ORDER — LIDOCAINE HYDROCHLORIDE 10 MG/ML
1 INJECTION, SOLUTION EPIDURAL; INFILTRATION; INTRACAUDAL; PERINEURAL ONCE
Status: COMPLETED | OUTPATIENT
Start: 2018-10-29 | End: 2018-10-29

## 2018-10-29 RX ORDER — NEOSTIGMINE METHYLSULFATE 1 MG/ML
INJECTION, SOLUTION INTRAVENOUS
Status: DISCONTINUED | OUTPATIENT
Start: 2018-10-29 | End: 2018-10-29

## 2018-10-29 RX ORDER — BACITRACIN 50000 [IU]/1
INJECTION, POWDER, FOR SOLUTION INTRAMUSCULAR
Status: DISCONTINUED | OUTPATIENT
Start: 2018-10-29 | End: 2018-10-29 | Stop reason: HOSPADM

## 2018-10-29 RX ORDER — ACETAMINOPHEN 10 MG/ML
INJECTION, SOLUTION INTRAVENOUS
Status: DISCONTINUED | OUTPATIENT
Start: 2018-10-29 | End: 2018-10-29

## 2018-10-29 RX ORDER — SUCCINYLCHOLINE CHLORIDE 20 MG/ML
INJECTION INTRAMUSCULAR; INTRAVENOUS
Status: DISCONTINUED | OUTPATIENT
Start: 2018-10-29 | End: 2018-10-29

## 2018-10-29 RX ORDER — ROCURONIUM BROMIDE 10 MG/ML
INJECTION, SOLUTION INTRAVENOUS
Status: DISCONTINUED | OUTPATIENT
Start: 2018-10-29 | End: 2018-10-29

## 2018-10-29 RX ORDER — LORAZEPAM 2 MG/ML
0.25 INJECTION INTRAMUSCULAR ONCE AS NEEDED
Status: DISCONTINUED | OUTPATIENT
Start: 2018-10-29 | End: 2018-10-29

## 2018-10-29 RX ORDER — SODIUM CHLORIDE 9 MG/ML
INJECTION, SOLUTION INTRAVENOUS CONTINUOUS
Status: DISCONTINUED | OUTPATIENT
Start: 2018-10-29 | End: 2018-10-29

## 2018-10-29 RX ORDER — ONDANSETRON 8 MG/1
8 TABLET, ORALLY DISINTEGRATING ORAL EVERY 8 HOURS PRN
Status: DISCONTINUED | OUTPATIENT
Start: 2018-10-29 | End: 2018-10-30 | Stop reason: HOSPADM

## 2018-10-29 RX ORDER — EPHEDRINE SULFATE 50 MG/ML
INJECTION, SOLUTION INTRAVENOUS
Status: DISCONTINUED | OUTPATIENT
Start: 2018-10-29 | End: 2018-10-29

## 2018-10-29 RX ORDER — VITAMIN B COMPLEX
1 CAPSULE ORAL DAILY
COMMUNITY

## 2018-10-29 RX ORDER — HYDROCODONE BITARTRATE AND ACETAMINOPHEN 5; 325 MG/1; MG/1
1 TABLET ORAL EVERY 4 HOURS PRN
Status: DISCONTINUED | OUTPATIENT
Start: 2018-10-29 | End: 2018-10-30 | Stop reason: HOSPADM

## 2018-10-29 RX ORDER — SODIUM CHLORIDE 0.9 % (FLUSH) 0.9 %
5 SYRINGE (ML) INJECTION
Status: DISCONTINUED | OUTPATIENT
Start: 2018-10-29 | End: 2018-10-29

## 2018-10-29 RX ORDER — ONDANSETRON 2 MG/ML
INJECTION INTRAMUSCULAR; INTRAVENOUS
Status: DISCONTINUED | OUTPATIENT
Start: 2018-10-29 | End: 2018-10-29

## 2018-10-29 RX ORDER — LIDOCAINE HCL/PF 100 MG/5ML
SYRINGE (ML) INTRAVENOUS
Status: DISCONTINUED | OUTPATIENT
Start: 2018-10-29 | End: 2018-10-29

## 2018-10-29 RX ORDER — ISOSULFAN BLUE 50 MG/5ML
INJECTION, SOLUTION SUBCUTANEOUS
Status: DISCONTINUED | OUTPATIENT
Start: 2018-10-29 | End: 2018-10-29 | Stop reason: HOSPADM

## 2018-10-29 RX ORDER — DEXAMETHASONE SODIUM PHOSPHATE 4 MG/ML
INJECTION, SOLUTION INTRA-ARTICULAR; INTRALESIONAL; INTRAMUSCULAR; INTRAVENOUS; SOFT TISSUE
Status: DISCONTINUED | OUTPATIENT
Start: 2018-10-29 | End: 2018-10-29

## 2018-10-29 RX ORDER — SODIUM CHLORIDE 0.9 % (FLUSH) 0.9 %
3 SYRINGE (ML) INJECTION
Status: DISCONTINUED | OUTPATIENT
Start: 2018-10-29 | End: 2018-10-30 | Stop reason: HOSPADM

## 2018-10-29 RX ORDER — PROPOFOL 10 MG/ML
VIAL (ML) INTRAVENOUS
Status: DISCONTINUED | OUTPATIENT
Start: 2018-10-29 | End: 2018-10-29

## 2018-10-29 RX ORDER — POLYETHYLENE GLYCOL 3350 17 G/17G
17 POWDER, FOR SOLUTION ORAL DAILY
Status: DISCONTINUED | OUTPATIENT
Start: 2018-10-30 | End: 2018-10-30 | Stop reason: HOSPADM

## 2018-10-29 RX ORDER — FENTANYL CITRATE 50 UG/ML
INJECTION, SOLUTION INTRAMUSCULAR; INTRAVENOUS
Status: DISCONTINUED | OUTPATIENT
Start: 2018-10-29 | End: 2018-10-29

## 2018-10-29 RX ORDER — HYDROMORPHONE HYDROCHLORIDE 1 MG/ML
0.5 INJECTION, SOLUTION INTRAMUSCULAR; INTRAVENOUS; SUBCUTANEOUS EVERY 6 HOURS PRN
Status: DISCONTINUED | OUTPATIENT
Start: 2018-10-29 | End: 2018-10-30 | Stop reason: HOSPADM

## 2018-10-29 RX ORDER — SODIUM CHLORIDE, SODIUM LACTATE, POTASSIUM CHLORIDE, CALCIUM CHLORIDE 600; 310; 30; 20 MG/100ML; MG/100ML; MG/100ML; MG/100ML
INJECTION, SOLUTION INTRAVENOUS CONTINUOUS
Status: DISCONTINUED | OUTPATIENT
Start: 2018-10-29 | End: 2018-10-30

## 2018-10-29 RX ORDER — HYDROMORPHONE HYDROCHLORIDE 1 MG/ML
0.2 INJECTION, SOLUTION INTRAMUSCULAR; INTRAVENOUS; SUBCUTANEOUS EVERY 5 MIN PRN
Status: DISCONTINUED | OUTPATIENT
Start: 2018-10-29 | End: 2018-10-29 | Stop reason: HOSPADM

## 2018-10-29 RX ORDER — SODIUM CHLORIDE 0.9 % (FLUSH) 0.9 %
3 SYRINGE (ML) INJECTION
Status: DISCONTINUED | OUTPATIENT
Start: 2018-10-29 | End: 2018-10-29

## 2018-10-29 RX ORDER — KETAMINE HYDROCHLORIDE 10 MG/ML
INJECTION, SOLUTION INTRAMUSCULAR; INTRAVENOUS
Status: DISCONTINUED | OUTPATIENT
Start: 2018-10-29 | End: 2018-10-29

## 2018-10-29 RX ORDER — ROPIVACAINE HYDROCHLORIDE 5 MG/ML
INJECTION, SOLUTION EPIDURAL; INFILTRATION; PERINEURAL
Status: COMPLETED | OUTPATIENT
Start: 2018-10-29 | End: 2018-10-29

## 2018-10-29 RX ORDER — CLINDAMYCIN PHOSPHATE 900 MG/50ML
900 INJECTION, SOLUTION INTRAVENOUS
Status: COMPLETED | OUTPATIENT
Start: 2018-10-29 | End: 2018-10-29

## 2018-10-29 RX ADMIN — GLYCOPYRROLATE 0.2 MG: 0.2 INJECTION, SOLUTION INTRAMUSCULAR; INTRAVENOUS at 10:10

## 2018-10-29 RX ADMIN — CYCLOBENZAPRINE HYDROCHLORIDE 10 MG: 10 TABLET, FILM COATED ORAL at 09:10

## 2018-10-29 RX ADMIN — PROPOFOL 140 MG: 10 INJECTION, EMULSION INTRAVENOUS at 10:10

## 2018-10-29 RX ADMIN — SODIUM CHLORIDE, SODIUM GLUCONATE, SODIUM ACETATE, POTASSIUM CHLORIDE, MAGNESIUM CHLORIDE, SODIUM PHOSPHATE, DIBASIC, AND POTASSIUM PHOSPHATE: .53; .5; .37; .037; .03; .012; .00082 INJECTION, SOLUTION INTRAVENOUS at 05:10

## 2018-10-29 RX ADMIN — FENTANYL CITRATE 25 MCG: 50 INJECTION, SOLUTION INTRAMUSCULAR; INTRAVENOUS at 04:10

## 2018-10-29 RX ADMIN — ROPIVACAINE HYDROCHLORIDE 40 ML: 5 INJECTION, SOLUTION EPIDURAL; INFILTRATION; PERINEURAL at 10:10

## 2018-10-29 RX ADMIN — FENTANYL CITRATE 25 MCG: 50 INJECTION INTRAMUSCULAR; INTRAVENOUS at 09:10

## 2018-10-29 RX ADMIN — GLYCOPYRROLATE 0.4 MG: 0.2 INJECTION, SOLUTION INTRAMUSCULAR; INTRAVENOUS at 05:10

## 2018-10-29 RX ADMIN — EPHEDRINE SULFATE 5 MG: 50 INJECTION, SOLUTION INTRAMUSCULAR; INTRAVENOUS; SUBCUTANEOUS at 10:10

## 2018-10-29 RX ADMIN — FENTANYL CITRATE 50 MCG: 50 INJECTION, SOLUTION INTRAMUSCULAR; INTRAVENOUS at 10:10

## 2018-10-29 RX ADMIN — SUCCINYLCHOLINE CHLORIDE 180 MG: 20 INJECTION, SOLUTION INTRAMUSCULAR; INTRAVENOUS at 10:10

## 2018-10-29 RX ADMIN — CLINDAMYCIN PHOSPHATE 900 MG: 18 INJECTION, SOLUTION INTRAVENOUS at 04:10

## 2018-10-29 RX ADMIN — ACETAMINOPHEN 1000 MG: 10 INJECTION, SOLUTION INTRAVENOUS at 10:10

## 2018-10-29 RX ADMIN — NEOSTIGMINE METHYLSULFATE 3 MG: 1 INJECTION INTRAVENOUS at 05:10

## 2018-10-29 RX ADMIN — DEXAMETHASONE SODIUM PHOSPHATE 4 MG: 4 INJECTION, SOLUTION INTRAMUSCULAR; INTRAVENOUS at 10:10

## 2018-10-29 RX ADMIN — GLYCOPYRROLATE 0.2 MG: 0.2 INJECTION, SOLUTION INTRAMUSCULAR; INTRAVENOUS at 11:10

## 2018-10-29 RX ADMIN — EPHEDRINE SULFATE 5 MG: 50 INJECTION, SOLUTION INTRAMUSCULAR; INTRAVENOUS; SUBCUTANEOUS at 11:10

## 2018-10-29 RX ADMIN — ROCURONIUM BROMIDE 10 MG: 10 INJECTION, SOLUTION INTRAVENOUS at 10:10

## 2018-10-29 RX ADMIN — EPHEDRINE SULFATE 10 MG: 50 INJECTION, SOLUTION INTRAMUSCULAR; INTRAVENOUS; SUBCUTANEOUS at 11:10

## 2018-10-29 RX ADMIN — LIDOCAINE HYDROCHLORIDE 10 MG: 10 INJECTION, SOLUTION EPIDURAL; INFILTRATION; INTRACAUDAL at 08:10

## 2018-10-29 RX ADMIN — ONDANSETRON 4 MG: 2 INJECTION INTRAMUSCULAR; INTRAVENOUS at 04:10

## 2018-10-29 RX ADMIN — SODIUM CHLORIDE, SODIUM LACTATE, POTASSIUM CHLORIDE, AND CALCIUM CHLORIDE: .6; .31; .03; .02 INJECTION, SOLUTION INTRAVENOUS at 06:10

## 2018-10-29 RX ADMIN — MIDAZOLAM HYDROCHLORIDE 2 MG: 1 INJECTION, SOLUTION INTRAMUSCULAR; INTRAVENOUS at 09:10

## 2018-10-29 RX ADMIN — KETAMINE HYDROCHLORIDE 10 MG: 10 INJECTION, SOLUTION INTRAMUSCULAR; INTRAVENOUS at 01:10

## 2018-10-29 RX ADMIN — FENTANYL CITRATE 50 MCG: 50 INJECTION, SOLUTION INTRAMUSCULAR; INTRAVENOUS at 05:10

## 2018-10-29 RX ADMIN — KETAMINE HYDROCHLORIDE 10 MG: 10 INJECTION, SOLUTION INTRAMUSCULAR; INTRAVENOUS at 02:10

## 2018-10-29 RX ADMIN — FENTANYL CITRATE 50 MCG: 50 INJECTION, SOLUTION INTRAMUSCULAR; INTRAVENOUS at 01:10

## 2018-10-29 RX ADMIN — FENTANYL CITRATE 50 MCG: 50 INJECTION, SOLUTION INTRAMUSCULAR; INTRAVENOUS at 12:10

## 2018-10-29 RX ADMIN — SODIUM CHLORIDE, SODIUM GLUCONATE, SODIUM ACETATE, POTASSIUM CHLORIDE, MAGNESIUM CHLORIDE, SODIUM PHOSPHATE, DIBASIC, AND POTASSIUM PHOSPHATE: .53; .5; .37; .037; .03; .012; .00082 INJECTION, SOLUTION INTRAVENOUS at 10:10

## 2018-10-29 RX ADMIN — HEPARIN SODIUM 5000 UNITS: 5000 INJECTION, SOLUTION INTRAVENOUS; SUBCUTANEOUS at 10:10

## 2018-10-29 RX ADMIN — EPHEDRINE SULFATE 5 MG: 50 INJECTION, SOLUTION INTRAMUSCULAR; INTRAVENOUS; SUBCUTANEOUS at 01:10

## 2018-10-29 RX ADMIN — SODIUM CHLORIDE 1000 ML: 0.9 INJECTION, SOLUTION INTRAVENOUS at 08:10

## 2018-10-29 RX ADMIN — ONDANSETRON 8 MG: 8 TABLET, ORALLY DISINTEGRATING ORAL at 10:10

## 2018-10-29 RX ADMIN — ROCURONIUM BROMIDE 20 MG: 10 INJECTION, SOLUTION INTRAVENOUS at 02:10

## 2018-10-29 RX ADMIN — KETAMINE HYDROCHLORIDE 30 MG: 10 INJECTION, SOLUTION INTRAMUSCULAR; INTRAVENOUS at 10:10

## 2018-10-29 RX ADMIN — LIDOCAINE HYDROCHLORIDE 70 MG: 20 INJECTION, SOLUTION INTRAVENOUS at 10:10

## 2018-10-29 RX ADMIN — FENTANYL CITRATE 25 MCG: 50 INJECTION, SOLUTION INTRAMUSCULAR; INTRAVENOUS at 02:10

## 2018-10-29 RX ADMIN — CLINDAMYCIN PHOSPHATE 900 MG: 18 INJECTION, SOLUTION INTRAVENOUS at 10:10

## 2018-10-29 NOTE — DISCHARGE INSTRUCTIONS
Discharge Instructions: Caring for Your Farhan-Otoole Drainage Tube  Your doctor discharges you with a Farhan-Otoole drainage tube. Doctors commonly leave this drain within the abdominal cavity after surgery. It helps drain and collect blood and body fluid after surgery. This can prevent swelling and reduces the risk for infection. The tube is held in place by a few stitches. It is covered with a bandage. Your doctor will remove the drain when he or she determines you no longer need it.  Home care  · Dont sleep on the same side as the tube.  · Secure the tube and bag inside your clothing with a safety pin. This helps keep the tube from being pulled out.  · Empty your drain at least twice a day. Empty it more often if the drain is full. Wash  and dry your hands before emptying the drain.  ¨ Lift the opening on the drain.  ¨ Drain the fluid into a measuring cup.  ¨ Record the amount of fluid each time you empty the drain. Include the date and time it was emptied. Share this information with your doctor on your next visit.  ¨ Squeeze the bulb with your hands until you hear air coming out of the bulb if your doctor has instructed you to do so (sometimes the bulb is used as a reservoir without suction). Check with your doctor about specific drain instructions.  ¨ Close the opening.  · Change the dressing around the tube every day.  ¨ Wash your hands.  ¨ Remove the old bandage.  ¨ Wash your hands again.  ¨ Wet a cotton swab and clean the skin around the incision and tube site. Use normal saline solution (salt and water). Or, you can use warm, soapy water.  ¨ Put a new bandage on the incision and tube site. Make the bandage large enough to cover the whole incision area.  ¨ Tape the bandage in place.  · Keep the bandage and tube site dry when you shower. Ask your healthcare provider about the best way to do this.  · Stripping the tube helps keep blood clots from blocking the tube. Ask your nurse how often you should  strip the tube. Stripping may not be needed, depending on where and why your doctor placed the tube. It may even be dangerous in some cases.   ¨ Hold the tubing where it leaves the skin, with one hand. This keeps it from pulling on the skin.  ¨ Pinch the tubing with the thumb and first finger of your other hand.  ¨ Slowly and firmly pull your thumb and first finger down the tubing. You may find it helpful to hold an alcohol swab between your fingers and the tube to lubricate the tubing.  ¨ If the pulling hurts or feels like its coming out of the skin, stop. Begin again more gently.  Follow-up care  Make a follow-up appointment as directed by our staff.     When to seek medical care  Call your healthcare provider right away if you have any of the following:  · New or increased pain around the tube  · Redness, swelling, or warmth around the incision or tube  · Drainage that is foul-smelling  · Vomiting  · Fever of 100.4°F (38°C)  · Fluid leaking around the tube  · Incision seems not to be healing  · Stitches become loose  · Tube falls out or breaks  · Drainage that changes from light pink to dark red  · Blood clots in the drainage bulb  · A sudden increase or decrease in the amount of drainage (over 30 mL)   Date Last Reviewed: 2/1/2017 © 2000-2017 [employer]. [address] [place] [address], [place] [address]. All rights reserved. This information is not intended as a substitute for professional medical care. Always follow your healthcare professional's instructions.        Care After Breast Implants  You had a procedure called breast augmentation (enlargement). It is also known as augmentation mammoplasty. This surgery enhances the size and shape of a womans breasts. Women choose breast augmentation to enlarge breast size, to correct a reduction in breast size after pregnancy, to balance a difference in breast size, or to reconstruct the breast following breast surgery or mastectomy. Heres what you need to  do after this procedure.  Activity  · Dont raise your arms above breast level until your health care provider says its OK. This prevents the implants from shifting.  · Dont lift, push, or pull anything heavier than 10 pounds for at least 5 to 7 days.  · Sleep on your back. Use pillows to keep the upper part of your body elevated.  · Dont drive until your health care provider says its OK.  Other home care  · Keep an ice pack on your chest to relieve discomfort and to avoid extra swelling. Put the ice pack on for 20 minutes; then leave it off for 20 minutes. Repeat as often as necessary.  · Wear the special bra or bandage you were given before discharge as directed by your health care provider. Expect to wear the bra or wrap 24 hours a day for about 3 to 4 weeks. You may remove it when you shower, starting 3 days after your surgery.  · Shower as necessary, starting 3 days after surgery. Gently wash your incision site. Pat the incision dry. Dont apply lotions, oils, or creams.  · Do not submerge your incision in a tub bath until it is completely closed. Doing so may introduce bacteria and cause an infection.  · You will have a dressing over your incisions. Be sure to ask your health care provider how to care for your dressing. Your stitches may dissolve on their own. Or, they may be removed at a follow-up appointment. If you have small white adhesive strips at your incision sites, do not remove them. They will come off on their own.  · Make an appointment to have your stitches or staples removed in 7 to 10 days.  · Remember, the swelling in your breasts may take 3 to 5 weeks to disappear.  · Take your medication exactly as directed.  Follow-up  Make a follow-up appointment as directed by our staff.  When to call your health care provider  Call your  health care provider right away if you have any of the following:  · Trouble breathing  · Sudden shortness of breath or gradual shortness of breath that gets  worse  · Sudden chest pain  · Fever of 100.4°F (38°C) or higher, or chills  · Bleeding or drainage through the special bra or bandage  · Pain that is not relieved by medication; increasing pain, with or without activity  · More soreness, swelling, or bruising on 1 breast than the other  · Redness, or breasts that feel warm to the touch  · Any rapid swelling in 1 area or breast   Date Last Reviewed: 4/20/2015 © 2000-2017 [employer]. [address] [place] [address]. All rights reserved. This information is not intended as a substitute for professional medical care. Always follow your healthcare professional's instructions.

## 2018-10-29 NOTE — ANESTHESIA PROCEDURE NOTES
Paravertebral Single Injection Block(s)    Patient location during procedure: pre-op   Block not for primary anesthetic.  Reason for block: at surgeon's request and post-op pain management   Post-op Pain Location: Anterior chest wall pain  Start time: 10/29/2018 9:56 AM  Timeout: 10/29/2018 9:55 AM   End time: 10/29/2018 10:10 AM  Staffing  Anesthesiologist: Jannette Zhong MD  Resident/CRNA: Manjula Ge MD  Performed: resident/CRNA   Preanesthetic Checklist  Completed: patient identified, site marked, surgical consent, pre-op evaluation, timeout performed, IV checked, risks and benefits discussed and monitors and equipment checked  Peripheral Block  Patient position: sitting  Prep: ChloraPrep  Patient monitoring: heart rate, cardiac monitor, continuous pulse ox, continuous capnometry and frequent blood pressure checks  Block type: paravertebral - thoracic  Laterality: bilateral  Injection technique: single shot  Needle  Needle type: Tuohy   Needle gauge: 17 G  Needle length: 3.5 in  Needle localization: anatomical landmarks     Assessment  Injection assessment: negative aspiration and negative parasthesia  Paresthesia pain: none  Heart rate change: no  Slow fractionated injection: yes  Additional Notes  T4 os at 4.5 cm  10cc of 0.5% ropi w/ 1:300K additionally for skin wheel  VSS.  [facility] RN monitoring vitals throughout procedure.  Patient tolerated procedure well.

## 2018-10-29 NOTE — INTERVAL H&P NOTE
The patient has been examined and the H&P has been reviewed:    I concur with the findings and no changes have occurred since H&P was written.    Anesthesia/Surgery risks, benefits and alternative options discussed and understood by patient/family.          Active Hospital Problems    Diagnosis  POA    Breast cancer [C50.919]  Yes      Resolved Hospital Problems   No resolved problems to display.

## 2018-10-29 NOTE — OR NURSING
Family notified of case start via epic messenger at 1130.  Family updated via epic messenger at 1230.  Family updated via epic messenger at 1327.  Family updated via epic messenger at 1533.  Family updated via epic messenger at 1703.    Specimen to pathology:   1.) Left axillary sentinel lymph node #1. Hot and blue 10,000. (frozen- sent to pathology)  2.) Left axillary sentinel lymph node #2. Warm and blue 4,000. (frozen- sent to pathology)  3.) Left axillary sentinel lymph node #3. Warm and blue 2,000. (frozen- sent to pathology)  4.) Left breast. Short stitch= superior, long stitch= lateral. (permanent)  5.) Left axillary contents (permanent)  6.) Right breast. Short stitch= superior, long stitch= lateraL. (permanent)

## 2018-10-29 NOTE — INTERVAL H&P NOTE
The patient has been examined and the H&P has been reviewed:    I concur with the findings and no changes have occurred since H&P was written.   B SSM with L SLNB, possible L ALND with R side portacath insertion      Anesthesia/Surgery risks, benefits and alternative options discussed and understood by patient/family.          Active Hospital Problems    Diagnosis  POA    Breast cancer [C50.919]  Yes      Resolved Hospital Problems   No resolved problems to display.

## 2018-10-29 NOTE — BRIEF OP NOTE
[facility]  Plastic Surgery  Operative Note    SUMMARY     Date of Procedure: 10/29/2018     Procedure: Procedure(s) (LRB):  MASTECTOMY (Right)  BIOPSY, LYMPH NODE, SENTINEL (Left)  INJECTION, FOR SENTINEL NODE IDENTIFICATION (Left)  LYMPHADENECTOMY, AXILLARY (Left)  [id number]-CATH with flouro (Right)  RECONSTRUCTION, BREAST (Bilateral)  MASTECTOMY, MODIFIED RADICAL (Left)  INSERTION, BREAST IMPLANT (Bilateral)     Surgeon(s) and Role:  Panel 1:     * Triston Avendaño MD - Primary     * Carissa Lofton MD - Resident - Assisting  Panel 2:     * Jatin Smith MD - Primary     * Huy Lezama MD - Resident - Assisting     * Ginette Garcia MD - Fellow    Pre-Operative Diagnosis: Malignant neoplasm of left breast in female, estrogen receptor positive, unspecified site of breast [C50.912, Z17.0]    Post-Operative Diagnosis: Same    Anesthesia: General    Technical Procedures Used: Breast reconstruction with implants.     Description of the Findings of the Procedure: none    Significant Surgical Tasks Conducted by the Assistant(s), if Applicable: non3    Complications: No    Estimated Blood Loss (EBL): 30 mL           Implants:   Implant Name Type Inv. Item Serial No.  Lot No. LRB No. Used   KIT POWERPORT SINGLE 8FR - [id number]  KIT POWERPORT SINGLE 8FR  [employer] [id number] Right 1   Natrelle inspira    [id number]   Right 1   Natrelle inspira    [id number]   Left 1   Alloderm select- tissue matrix     [id number] Left 1   Alloderm Select- tissue matrix     [id number] Right 1       Specimens:   Specimen (12h ago, onward)    Start     Ordered    10/29/18 1711  Specimen to Pathology - Surgery  Once     Comments:  Specimen to pathology: 1.) Left axillary sentinel lymph node #1. Hot and blue 10,000. (frozen- sent to pathology)2.) Left axillary sentinel lymph node #2. Warm and blue 4,000. (frozen- sent to pathology)3.) Left axillary sentinel lymph node #3. Warm and  blue 2,000. (frozen- sent to pathology)4.) Left breast. Short stitch= superior, long stitch= lateral. (permanent)5.) Left axillary contents (permanent)6.) Right breast. Short stitch= superior, long stitch= lateraL. (permanent)     Start Status   10/29/18 1711 Collected (10/29/18 1714)       10/29/18 1711                  Condition: Good    Disposition: PACU - hemodynamically stable.    Attestation: I was present and scrubbed for the entire procedure.

## 2018-10-29 NOTE — ANESTHESIA PREPROCEDURE EVALUATION
10/29/2018  Beatriz Faust is a 63 y.o., female.    Anesthesia Evaluation    I have reviewed the Patient Summary Reports.        Review of Systems  Anesthesia Hx:  No problems with previous Anesthesia    Social:  Non-Smoker    Hematology/Oncology:  Hematology Normal      Current/Recent Cancer. (L Breast Cancer)   EENT/Dental:EENT/Dental Normal   Cardiovascular:  Cardiovascular Normal     Pulmonary:  Pulmonary Normal    Renal/:  Renal/ Normal     Hepatic/GI:  Hepatic/GI Normal    Musculoskeletal:  Musculoskeletal Normal    Neurological:  Neurology Normal    Endocrine:  Endocrine Normal    Dermatological:  Skin Normal    Psych:  Psychiatric Normal           Physical Exam  General:  Well nourished    Airway/Jaw/Neck:  Airway Findings: Mouth Opening: Normal Tongue: Normal  General Airway Assessment: Adult  Mallampati: II  Improves to II with phonation.  TM Distance: Normal, at least 6 cm  Jaw/Neck Findings:  Neck ROM: Normal ROM      Dental:  Dental Findings: In tact   Chest/Lungs:  Chest/Lungs Findings: Clear to auscultation, Normal Respiratory Rate     Heart/Vascular:  Heart Findings: Rate: Normal  Rhythm: Regular Rhythm  Sounds: Normal             Anesthesia Plan  Type of Anesthesia, risks & benefits discussed:  Anesthesia Type:  general  Patient's Preference: General  Intra-op Monitoring Plan:   Intra-op Monitoring Plan Comments:   Post Op Pain Control Plan:   Post Op Pain Control Plan Comments:   Induction:   IV  Beta Blocker:  Patient is not currently on a Beta-Blocker (No further documentation required).       Informed Consent: Patient understands risks and agrees with Anesthesia plan.  Questions answered. Anesthesia consent signed with patient.  ASA Score: 3     Day of Surgery Review of History & Physical: I have interviewed and examined the patient. I have reviewed the patient's H&P dated:  There  are no significant changes.          Ready For Surgery From Anesthesia Perspective.

## 2018-10-29 NOTE — TRANSFER OF CARE
"Anesthesia Transfer of Care Note    Patient: Beatriz Faust    Procedure(s) Performed: Procedure(s) (LRB):  MASTECTOMY (Right)  BIOPSY, LYMPH NODE, SENTINEL (Left)  INJECTION, FOR SENTINEL NODE IDENTIFICATION (Left)  LYMPHADENECTOMY, AXILLARY (Left)  QRRBETSIO-MXSA-W-CATH with flouro (Right)  RECONSTRUCTION, BREAST (Bilateral)  MASTECTOMY, MODIFIED RADICAL (Left)  INSERTION, BREAST IMPLANT (Bilateral)    Patient location: PACU    Anesthesia Type: general    Transport from OR: Transported from OR on 6-10 L/min O2 by face mask with adequate spontaneous ventilation    Post pain: adequate analgesia    Post assessment: no apparent anesthetic complications and tolerated procedure well    Post vital signs: stable    Level of consciousness: awake    Nausea/Vomiting: no nausea/vomiting    Complications: none    Transfer of care protocol was followed      Last vitals:   Visit Vitals  /67 (BP Location: Left leg, Patient Position: Lying)   Pulse 75   Temp 36.6 °C (97.9 °F) (Temporal)   Resp 16   Ht 5' 6" (1.676 m)   Wt 108 kg (238 lb)   SpO2 100%   Breastfeeding? No   BMI 38.41 kg/m²     "

## 2018-10-30 VITALS
DIASTOLIC BLOOD PRESSURE: 68 MMHG | RESPIRATION RATE: 18 BRPM | OXYGEN SATURATION: 95 % | HEART RATE: 56 BPM | WEIGHT: 238 LBS | BODY MASS INDEX: 38.25 KG/M2 | TEMPERATURE: 98 F | HEIGHT: 66 IN | SYSTOLIC BLOOD PRESSURE: 127 MMHG

## 2018-10-30 PROCEDURE — 12000002 HC ACUTE/MED SURGE SEMI-PRIVATE ROOM

## 2018-10-30 PROCEDURE — 25000003 PHARM REV CODE 250: Performed by: STUDENT IN AN ORGANIZED HEALTH CARE EDUCATION/TRAINING PROGRAM

## 2018-10-30 PROCEDURE — 63600175 PHARM REV CODE 636 W HCPCS: Performed by: STUDENT IN AN ORGANIZED HEALTH CARE EDUCATION/TRAINING PROGRAM

## 2018-10-30 RX ORDER — OXYCODONE AND ACETAMINOPHEN 5; 325 MG/1; MG/1
1 TABLET ORAL EVERY 4 HOURS PRN
Qty: 40 TABLET | Refills: 0 | Status: SHIPPED | OUTPATIENT
Start: 2018-10-30 | End: 2018-10-31 | Stop reason: ALTCHOICE

## 2018-10-30 RX ORDER — CYCLOBENZAPRINE HCL 10 MG
10 TABLET ORAL 3 TIMES DAILY PRN
Qty: 30 TABLET | Refills: 0 | Status: SHIPPED | OUTPATIENT
Start: 2018-10-30 | End: 2018-11-09

## 2018-10-30 RX ORDER — CEPHALEXIN 500 MG/1
500 CAPSULE ORAL EVERY 6 HOURS
Qty: 40 CAPSULE | Refills: 0 | Status: SHIPPED | OUTPATIENT
Start: 2018-10-30 | End: 2018-11-09 | Stop reason: ALTCHOICE

## 2018-10-30 RX ADMIN — ONDANSETRON 8 MG: 8 TABLET, ORALLY DISINTEGRATING ORAL at 09:10

## 2018-10-30 RX ADMIN — POLYETHYLENE GLYCOL 3350 17 G: 17 POWDER, FOR SOLUTION ORAL at 08:10

## 2018-10-30 RX ADMIN — OXYCODONE HYDROCHLORIDE 10 MG: 10 TABLET ORAL at 02:10

## 2018-10-30 RX ADMIN — ENOXAPARIN SODIUM 40 MG: 100 INJECTION SUBCUTANEOUS at 08:10

## 2018-10-30 RX ADMIN — OXYCODONE HYDROCHLORIDE 10 MG: 10 TABLET ORAL at 08:10

## 2018-10-30 RX ADMIN — HYDROCODONE BITARTRATE AND ACETAMINOPHEN 1 TABLET: 5; 325 TABLET ORAL at 02:10

## 2018-10-30 NOTE — ASSESSMENT & PLAN NOTE
63-year-old female POD 1 from bilateral mastectomy with left axillary node dissection and bilateral reconstruction with implant. Doing well this AM. Urinating, walking to bathroom with good pain control. Drains with some sanguinous discharge.     -Lovenox  -Regular diet  -transition to PO antibiotics with keflex   -must wear surgical bra at all times unless showering.   -must not lift anything more than 5 pounds for at least 2 weeks.   -drain teaching today  -d/c home with wound vac, teaching  -Likely d/c today from plastic surgery standpoint with 1 week follow up with Dr. Smith

## 2018-10-30 NOTE — ANESTHESIA POSTPROCEDURE EVALUATION
"Anesthesia Post Evaluation    Patient: Beatriz Faust    Procedure(s) Performed: Procedure(s) (LRB):  MASTECTOMY (Right)  BIOPSY, LYMPH NODE, SENTINEL (Left)  INJECTION, FOR SENTINEL NODE IDENTIFICATION (Left)  LYMPHADENECTOMY, AXILLARY (Left)  NLXBPXOPD-BSPI-J-CATH with flouro (Right)  RECONSTRUCTION, BREAST (Bilateral)  MASTECTOMY, MODIFIED RADICAL (Left)  INSERTION, BREAST IMPLANT (Bilateral)    Final Anesthesia Type: general  Patient location during evaluation: PACU  Patient participation: Yes- Able to Participate  Level of consciousness: awake and alert and oriented  Post-procedure vital signs: reviewed and stable  Pain management: adequate  Airway patency: patent  PONV status at discharge: No PONV  Anesthetic complications: no      Cardiovascular status: blood pressure returned to baseline and hemodynamically stable  Respiratory status: unassisted and spontaneous ventilation  Hydration status: euvolemic  Follow-up not needed.        Visit Vitals  BP (!) 159/77 (BP Location: Right leg, Patient Position: Lying)   Pulse 72   Temp 36.2 °C (97.2 °F) (Oral)   Resp 20   Ht 5' 6" (1.676 m)   Wt 108 kg (238 lb)   SpO2 96%   Breastfeeding? No   BMI 38.41 kg/m²       Pain/Hunter Score: Pain Assessment Performed: Yes (10/29/2018  7:15 PM)  Presence of Pain: denies (10/29/2018  7:15 PM)  Pain Rating Prior to Med Admin: 0 (10/29/2018  9:56 AM)  Hunter Score: 9 (10/29/2018  7:15 PM)        "

## 2018-10-30 NOTE — PROGRESS NOTES
[facility]  Plastic Surgery  Progress Note    Subjective:     History of Present Illness:  No notes on file    Post-Op Info:  Procedure(s) (LRB):  MASTECTOMY (Right)  BIOPSY, LYMPH NODE, SENTINEL (Left)  INJECTION, FOR SENTINEL NODE IDENTIFICATION (Left)  LYMPHADENECTOMY, AXILLARY (Left)  JAFBCVTDS-RBXB-W-CATH with flouro (Right)  RECONSTRUCTION, BREAST (Bilateral)  MASTECTOMY, MODIFIED RADICAL (Left)  INSERTION, BREAST IMPLANT (Bilateral)   1 Day Post-Op     Interval History: No acute events overnight. Urinated three times. Some nausea relieved by Zofran.     Medications:  Continuous Infusions:  Scheduled Meds:   cyclobenzaprine  10 mg Oral TID    enoxaparin  40 mg Subcutaneous Daily    polyethylene glycol  17 g Oral Daily     PRN Meds:HYDROcodone-acetaminophen, HYDROmorphone, ondansetron, oxyCODONE, sodium chloride 0.9%     Review of patient's allergies indicates:  No Known Allergies  Objective:     Vital Signs (Most Recent):  Temp: 98.9 °F (37.2 °C) (10/30/18 0448)  Pulse: (!) 58 (10/30/18 0448)  Resp: 18 (10/30/18 0448)  BP: (!) 142/69 (10/30/18 0448)  SpO2: 95 % (10/30/18 0448) Vital Signs (24h Range):  Temp:  [97.2 °F (36.2 °C)-98.9 °F (37.2 °C)] 98.9 °F (37.2 °C)  Pulse:  [53-75] 58  Resp:  [16-20] 18  SpO2:  [94 %-100 %] 95 %  BP: (116-174)/(59-80) 142/69     Weight: 108 kg (238 lb)  Body mass index is 38.41 kg/m².    Intake/Output - Last 3 Shifts       10/28 0700 - 10/29 0659 10/29 0700 - 10/30 0659    I.V. (mL/kg)  2200 (20.4)    Total Intake(mL/kg)  2200 (20.4)    Urine (mL/kg/hr)  720    Drains  130    Blood  30    Total Output  880    Net  +1320                Physical Exam   Constitutional: She appears well-developed and well-nourished. No distress.   HENT:   Head: Normocephalic and atraumatic.   Eyes: EOM are normal.   Neck: Normal range of motion.   Cardiovascular: Normal rate.   Pulmonary/Chest: Effort normal.   Abdominal: Soft. There is no tenderness.   Musculoskeletal:  Normal range of motion.   Bilateral wound vac over breast incision with good seal.    Neurological: She is alert.   Skin: Skin is warm and dry.   Psychiatric: She has a normal mood and affect. Her behavior is normal.   Nursing note and vitals reviewed.    Drains with 130 out total. Sanguinous.   Significant Labs:  pending    Significant Diagnostics:  none    Assessment/Plan:     Breast cancer    63-year-old female POD 1 from bilateral mastectomy with left axillary node dissection and bilateral reconstruction with implant. Doing well this AM. Urinating, walking to bathroom with good pain control. Drains with some sanguinous discharge.     -Lovenox  -Regular diet  -transition to PO antibiotics with keflex   -must wear surgical bra at all times unless showering.   -must not lift anything more than 5 pounds for at least 2 weeks.   -drain teaching today  -d/c home with wound vac, teaching  -Likely d/c today from plastic surgery standpoint with 1 week follow up with Dr. Sarah Lezama MD  Plastic Surgery  [facility]

## 2018-10-30 NOTE — PLAN OF CARE
Patient is a 63 year old female admitted from home 10/29/2018 and underwent MASTECTOMY (Right),  BIOPSY, LYMPH NODE, SENTINEL (Left), INJECTION, FOR SENTINEL NODE IDENTIFICATION (Left),  LYMPHADENECTOMY, AXILLARY (Left), [id number]-V-CATH with flouro (Right)  RECONSTRUCTION, BREAST (Bilateral), MASTECTOMY, MODIFIED RADICAL (Left),   INSERTION, BREAST IMPLANT (Bilateral).  Patient discharging home today with no needs.    PCP  Rey Roque MD  [address] [place] [address] / [place] [address]  [phone]  [phone]      [facility] [id number]  [address]  [place] [address]  Phone: [phone] Fax: [phone]      Extended Emergency Contact Information  Primary Emergency Contact: Benjamin Cespedes  Mobile Phone: [phone]  Relation: Sister   needed? No  Secondary Emergency Contact: Catia Fermin   [place]  Mobile Phone: [phone]  Relation: Sister       10/30/18 1427   Discharge Assessment   Assessment Type Discharge Planning Assessment   Expected Length of Stay (days) 2   Prior to hospitilization cognitive status: Alert/Oriented   Prior to hospitalization functional status: Independent   Current cognitive status: Alert/Oriented   Current Functional Status: Independent;Needs Assistance   Equipment Currently Used at Home none   Do you have any problems affording any of your prescribed medications? No   Is the patient taking medications as prescribed? yes   Does the patient have transportation home? Yes   Transportation Available family or friend will provide   Discharge Plan A Home with family

## 2018-10-30 NOTE — PLAN OF CARE
Patient discharging with family today with no needs.    Future Appointments   Date Time Provider Department Center   11/7/2018  9:00 AM Jatin Smith MD [facility] PLASTIC [facility]   11/13/2018  9:45 AM Triston Avendaño MD [facility] BRSTSUR [facility]          10/30/18 1430   Final Note   Assessment Type Final Discharge Note   Anticipated Discharge Disposition Home   Hospital Follow Up  Appt(s) scheduled? Yes   Right Care Referral Info   Post Acute Recommendation No Care

## 2018-10-30 NOTE — SUBJECTIVE & OBJECTIVE
Interval History: No acute events overnight. Urinated three times. Some nausea relieved by Zofran.     Medications:  Continuous Infusions:  Scheduled Meds:   cyclobenzaprine  10 mg Oral TID    enoxaparin  40 mg Subcutaneous Daily    polyethylene glycol  17 g Oral Daily     PRN Meds:HYDROcodone-acetaminophen, HYDROmorphone, ondansetron, oxyCODONE, sodium chloride 0.9%     Review of patient's allergies indicates:  No Known Allergies  Objective:     Vital Signs (Most Recent):  Temp: 98.9 °F (37.2 °C) (10/30/18 0448)  Pulse: (!) 58 (10/30/18 0448)  Resp: 18 (10/30/18 0448)  BP: (!) 142/69 (10/30/18 0448)  SpO2: 95 % (10/30/18 0448) Vital Signs (24h Range):  Temp:  [97.2 °F (36.2 °C)-98.9 °F (37.2 °C)] 98.9 °F (37.2 °C)  Pulse:  [53-75] 58  Resp:  [16-20] 18  SpO2:  [94 %-100 %] 95 %  BP: (116-174)/(59-80) 142/69     Weight: 108 kg (238 lb)  Body mass index is 38.41 kg/m².    Intake/Output - Last 3 Shifts       10/28 0700 - 10/29 0659 10/29 0700 - 10/30 0659    I.V. (mL/kg)  2200 (20.4)    Total Intake(mL/kg)  2200 (20.4)    Urine (mL/kg/hr)  720    Drains  130    Blood  30    Total Output  880    Net  +1320                Physical Exam   Constitutional: She appears well-developed and well-nourished. No distress.   HENT:   Head: Normocephalic and atraumatic.   Eyes: EOM are normal.   Neck: Normal range of motion.   Cardiovascular: Normal rate.   Pulmonary/Chest: Effort normal.   Abdominal: Soft. There is no tenderness.   Musculoskeletal: Normal range of motion.   Bilateral wound vac over breast incision with good seal.    Neurological: She is alert.   Skin: Skin is warm and dry.   Psychiatric: She has a normal mood and affect. Her behavior is normal.   Nursing note and vitals reviewed.    Drains with 130 out total. Sanguinous.   Significant Labs:  pending    Significant Diagnostics:  none

## 2018-10-31 ENCOUNTER — PATIENT MESSAGE (OUTPATIENT)
Dept: SURGERY | Facility: CLINIC | Age: 63
End: 2018-10-31

## 2018-10-31 RX ORDER — HYDROCODONE BITARTRATE AND ACETAMINOPHEN 5; 325 MG/1; MG/1
1 TABLET ORAL EVERY 6 HOURS PRN
Qty: 40 TABLET | Refills: 0 | Status: SHIPPED | OUTPATIENT
Start: 2018-10-31 | End: 2018-11-13

## 2018-10-31 NOTE — DISCHARGE SUMMARY
[facility]  General Surgery  Discharge Summary      Patient Name: Beatriz Faust  MRN: [id number]  Admission Date: 10/29/2018  Hospital Length of Stay: 1 days  Discharge Date and Time:  10/30/2018 9:22 PM  Attending Physician: Dr. Avendaño  Discharging Provider: Sarika Lofton MD  Primary Care Provider: Rey Roque MD     HPI: Interval History:  She presents today after MRI which showed the tumor size to be 2.5 cm, a PET which did not show additional hypermetabolic activity other than in the known tumor, and after seeing Babycos in clinic to talk about surgical options.     HPI:  Beatriz Faust is a 63 y.o.  female with no significant PMHx who presents to clinic for evaluation of newly diagnosed Left breast cancer.  She has not had a MMG in ~15 years.  She presented to the ED in early July for abdominal pain and a breast mass was incidentally found on CT scan.  She subsequently underwent a MMG/US and core needle bx which came back as high grade invasive ductal carcinoma ER/TX/Her2 +.  She was originally seen at [facility] where she was recommended to have neoadjuvant chemotherapy.  She presents today for a second opinion      No known family hx of breast cancer     Non-smoker, non-drinker     Gyn Hx:  Menarche 14, menopause ~47,     Procedure(s) (LRB):  MASTECTOMY (Right)  BIOPSY, LYMPH NODE, SENTINEL (Left)  INJECTION, FOR SENTINEL NODE IDENTIFICATION (Left)  LYMPHADENECTOMY, AXILLARY (Left)  RIHZSZRJT-BQOS-E-CATH with flouro (Right)  RECONSTRUCTION, BREAST (Bilateral)  MASTECTOMY, MODIFIED RADICAL (Left)  INSERTION, BREAST IMPLANT (Bilateral)     Hospital Course: Ms. Faust was admitted for the above named procedure. Patient tolerated the procedure well. Patient was able to void by self and had pain well controlled with oral pain medication. She will go home with a wound vac. A CXR confirmed good placement of right SC port, no PTX.     Physical Exam on day of  discharge:  NAD  RRR  Normal work of breathing  Bilateral breast incisions covered with wound vac, serosanguinous output in vac canister. Two edilia drains to bulb suction bilaterally with serosanguinous output.    Consults: none    Significant Diagnostic Studies: none    Pending Diagnostic Studies:     None        Final Active Diagnoses:    Diagnosis Date Noted POA    PRINCIPAL PROBLEM:  Breast cancer [C50.919] 10/29/2018 Yes      Problems Resolved During this Admission:      Discharged Condition: good    Disposition: Home or Self Care    Follow Up:  Follow-up Information     Jatin Smith MD On 11/7/2018.    Specialty:  Plastic Surgery  Why:  For wound re-check  Contact information:  [address]  [facility] [address]  [phone]                 Patient Instructions:      Lifting restrictions   Order Comments: No lifting more than 20 pounds over the next few weeks.     Notify your health care provider if you experience any of the following:  temperature >100.4     Notify your health care provider if you experience any of the following:  severe uncontrolled pain     Notify your health care provider if you experience any of the following:  redness, tenderness, or signs of infection (pain, swelling, redness, odor or green/yellow discharge around incision site)     No dressing needed   Order Comments: You can take the port dressing off in 2 days and shower like normal with soap and water over the incision. The purple skin glue will come off on its own in a few weeks.     Medications:  Reconciled Home Medications:      Medication List      START taking these medications    cephALEXin 500 MG capsule  Commonly known as:  KEFLEX  Take 1 capsule (500 mg total) by mouth every 6 (six) hours. for 10 days     cyclobenzaprine 10 MG tablet  Commonly known as:  FLEXERIL  Take 1 tablet (10 mg total) by mouth 3 (three) times daily as needed for Muscle spasms.     oxyCODONE-acetaminophen 5-325 mg per  tablet  Commonly known as:  PERCOCET  Take 1 tablet by mouth every 4 (four) hours as needed.        CONTINUE taking these medications    b complex vitamins capsule  Take 1 capsule by mouth once daily.     co-enzyme Q-10 30 mg capsule  Take 30 mg by mouth 3 (three) times daily.     GREEN TEA EXTRACT ORAL  Take by mouth.            Sarika Lofton MD  General Surgery  [facility]

## 2018-11-01 ENCOUNTER — TELEPHONE (OUTPATIENT)
Dept: PLASTIC SURGERY | Facility: CLINIC | Age: 63
End: 2018-11-01

## 2018-11-01 ENCOUNTER — OFFICE VISIT (OUTPATIENT)
Dept: PLASTIC SURGERY | Facility: CLINIC | Age: 63
End: 2018-11-01
Payer: COMMERCIAL

## 2018-11-01 VITALS
HEART RATE: 82 BPM | DIASTOLIC BLOOD PRESSURE: 88 MMHG | WEIGHT: 238 LBS | BODY MASS INDEX: 38.25 KG/M2 | SYSTOLIC BLOOD PRESSURE: 177 MMHG | TEMPERATURE: 98 F | HEIGHT: 66 IN

## 2018-11-01 DIAGNOSIS — Z09 SURGERY FOLLOW-UP EXAMINATION: Primary | ICD-10-CM

## 2018-11-01 PROCEDURE — 99999 PR PBB SHADOW E&M-EST. PATIENT-LVL III: CPT | Mod: PBBFAC,,, | Performed by: SURGERY

## 2018-11-01 PROCEDURE — 99024 POSTOP FOLLOW-UP VISIT: CPT | Mod: S$GLB,,, | Performed by: SURGERY

## 2018-11-01 NOTE — PROGRESS NOTES
Patient was seen in clinic for a wound vac leak. Wound vac was changed in clinic without issues. Patient to return to clinic in 1 week.     Huy Lezama, PGY1  Plastic Surgery  [phone]

## 2018-11-01 NOTE — TELEPHONE ENCOUNTER
Called pt to confirm wound vac dressing leaking. Denies s/s of infection at this time, pt not comfortable calling vac supply to troubleshoot. Instructed to come to [facility] now today from [place] to have assessed. Reported to MD. JOSE, ADAMS

## 2018-11-07 ENCOUNTER — OFFICE VISIT (OUTPATIENT)
Dept: PLASTIC SURGERY | Facility: CLINIC | Age: 63
End: 2018-11-07
Payer: COMMERCIAL

## 2018-11-07 VITALS
BODY MASS INDEX: 37.35 KG/M2 | TEMPERATURE: 98 F | SYSTOLIC BLOOD PRESSURE: 170 MMHG | DIASTOLIC BLOOD PRESSURE: 88 MMHG | HEART RATE: 59 BPM | WEIGHT: 232.38 LBS | HEIGHT: 66 IN

## 2018-11-07 DIAGNOSIS — C50.812 MALIGNANT NEOPLASM OF OVERLAPPING SITES OF LEFT BREAST IN FEMALE, ESTROGEN RECEPTOR POSITIVE: Primary | ICD-10-CM

## 2018-11-07 DIAGNOSIS — Z17.0 MALIGNANT NEOPLASM OF OVERLAPPING SITES OF LEFT BREAST IN FEMALE, ESTROGEN RECEPTOR POSITIVE: Primary | ICD-10-CM

## 2018-11-07 PROCEDURE — 99999 PR PBB SHADOW E&M-EST. PATIENT-LVL III: CPT | Mod: PBBFAC,,, | Performed by: SURGERY

## 2018-11-07 PROCEDURE — 99024 POSTOP FOLLOW-UP VISIT: CPT | Mod: S$GLB,,, | Performed by: SURGERY

## 2018-11-07 RX ORDER — CLINDAMYCIN HYDROCHLORIDE 300 MG/1
300 CAPSULE ORAL EVERY 8 HOURS
Qty: 21 CAPSULE | Refills: 0 | Status: SHIPPED | OUTPATIENT
Start: 2018-11-07 | End: 2018-11-14

## 2018-11-07 RX ORDER — CIPROFLOXACIN 500 MG/1
750 TABLET ORAL EVERY 12 HOURS
Qty: 21 TABLET | Refills: 0 | Status: SHIPPED | OUTPATIENT
Start: 2018-11-07 | End: 2018-11-13

## 2018-11-07 NOTE — PROGRESS NOTES
CC:  Pt is a 64 yo female with history of breast cancer presents for follow up for immediate bilateral breast reconstruction using implants and placement of alloderm 11/2/18. Patient reports the vac has been shut off for the past 24 hrs.  Patient denies fever, chills.    Review of patient's allergies indicates:  No Known Allergies    Current Outpatient Medications on File Prior to Visit   Medication Sig Dispense Refill    b complex vitamins capsule Take 1 capsule by mouth once daily.      cephALEXin (KEFLEX) 500 MG capsule Take 1 capsule (500 mg total) by mouth every 6 (six) hours. for 10 days 40 capsule 0    co-enzyme Q-10 30 mg capsule Take 30 mg by mouth 3 (three) times daily.      HYDROcodone-acetaminophen (NORCO) 5-325 mg per tablet Take 1 tablet by mouth every 6 (six) hours as needed for Pain. 40 tablet 0    cyclobenzaprine (FLEXERIL) 10 MG tablet Take 1 tablet (10 mg total) by mouth 3 (three) times daily as needed for Muscle spasms. 30 tablet 0    GREEN TEA EXTRACT ORAL Take by mouth.       No current facility-administered medications on file prior to visit.      Past Medical History:   Diagnosis Date    Breast cancer 07/2018     No family history on file.  Past Surgical History:   Procedure Laterality Date    AXILLARY NODE DISSECTION Left 10/29/2018    Procedure: LYMPHADENECTOMY, AXILLARY;  Surgeon: Triston Avendaño MD;  Location: [address];  Service: General;  Laterality: Left;    BILATERAL MASTECTOMY Right 10/29/2018    Procedure: MASTECTOMY;  Surgeon: Triston Avendaño MD;  Location: [address];  Service: General;  Laterality: Right;    BIOPSY, LYMPH NODE, SENTINEL Left 10/29/2018    Performed by Triston Avendaño MD at [facility] OR [address] [place] [address]    BREAST BIOPSY Left 07/2018    BREAST RECONSTRUCTION Bilateral 10/29/2018    Procedure: RECONSTRUCTION, BREAST;  Surgeon: Jatin Smith MD;  Location: [facility] OR [address] [place] [address];  Service: Plastics;  Laterality: Bilateral;    INJECTION FOR  SENTINEL NODE IDENTIFICATION Left 10/29/2018    Procedure: INJECTION, FOR SENTINEL NODE IDENTIFICATION;  Surgeon: Triston Avendaño MD;  Location: [facility] OR [address] [place] [address];  Service: General;  Laterality: Left;    INJECTION, FOR SENTINEL NODE IDENTIFICATION Left 10/29/2018    Performed by Triston Avendaño MD at [facility] OR [address] [place] [address]    INSERTION OF BREAST IMPLANT Bilateral 10/29/2018    Procedure: INSERTION, BREAST IMPLANT;  Surgeon: Jatin Smith MD;  Location: [facility] OR [address] [place] [address];  Service: Plastics;  Laterality: Bilateral;    INSERTION OF TUNNELED CENTRAL VENOUS CATHETER (CVC) WITH SUBCUTANEOUS PORT Right 10/29/2018    Procedure: [id number]-R-CATH with flouro;  Surgeon: Triston Avendaño MD;  Location: [facility] OR [address] [place] [address];  Service: General;  Laterality: Right;    INSERTION, BREAST IMPLANT Bilateral 10/29/2018    Performed by Jatin Smith MD at [facility] OR [address] [place] [address]    [id number]-M-CATH with flouro Right 10/29/2018    Performed by Triston Avendaño MD at [facility] OR [address] [place] [address]    LYMPHADENECTOMY, AXILLARY Left 10/29/2018    Performed by Triston Avendaño MD at [facility] OR [address] [place] [address]    MASTECTOMY Right 10/29/2018    Performed by Triston Avendaño MD at [facility] [place] [address] [place] [address]    MASTECTOMY, MODIFIED RADICAL Left 10/29/2018    Performed by Triston Avendaño MD at [facility] OR [address] [place] [address]    MODIFIED RADICAL MASTECTOMY W/ AXILLARY LYMPH NODE DISSECTION Left 10/29/2018    Procedure: MASTECTOMY, MODIFIED RADICAL;  Surgeon: Triston Avendaño MD;  Location: [facility] OR [address] [place] [address];  Service: General;  Laterality: Left;    RECONSTRUCTION, BREAST Bilateral 10/29/2018    Performed by Jatin Smith MD at [facility] OR [address] [place] [address]    SENTINEL LYMPH NODE BIOPSY Left 10/29/2018    Procedure: BIOPSY, LYMPH NODE, SENTINEL;  Surgeon: Triston Avendaño MD;  Location: [facility] OR [address] [place] [address];  Service: General;  Laterality: Left;    THYROID SURGERY       Social History     Socioeconomic History    Marital status: Single     Spouse name: Not on file     Number of children: Not on file    Years of education: Not on file    Highest education level: Not on file   Social Needs    Financial resource strain: Not on file    Food insecurity - worry: Not on file    Food insecurity - inability: Not on file    Transportation needs - medical: Not on file    Transportation needs - non-medical: Not on file   Occupational History    Not on file   Tobacco Use    Smoking status: Never Smoker    Smokeless tobacco: Never Used   Substance and Sexual Activity    Alcohol use: No     Frequency: Never    Drug use: No    Sexual activity: Not on file   Other Topics Concern    Not on file   Social History Narrative    Not on file     Physical exam:  Vitals:    11/07/18 0958   BP: (!) 170/88   Pulse: (!) 59   Temp: 98 °F (36.7 °C)       NAD  VSS  Normal respiratory effort  Wound vacs removed in clinic. Marked skin break down of the right breast. Bilateral ERAN drains in place.    Assessment:   Pt is a 62 yo female   - Ciprofloxacin and Clindamycin x 7 days  - Apply thin layer of bacitracin to right breast 3 times daily and cover with non-stick dressing  - Keep ERAN drains in place  - Will continue to follow patient to assess if implant exchange is necessary

## 2018-11-09 ENCOUNTER — OFFICE VISIT (OUTPATIENT)
Dept: PLASTIC SURGERY | Facility: CLINIC | Age: 63
End: 2018-11-09
Payer: COMMERCIAL

## 2018-11-09 VITALS
RESPIRATION RATE: 16 BRPM | DIASTOLIC BLOOD PRESSURE: 82 MMHG | SYSTOLIC BLOOD PRESSURE: 117 MMHG | WEIGHT: 235 LBS | TEMPERATURE: 98 F | BODY MASS INDEX: 37.77 KG/M2 | HEIGHT: 66 IN

## 2018-11-09 DIAGNOSIS — Z09 SURGERY FOLLOW-UP EXAMINATION: Primary | ICD-10-CM

## 2018-11-09 PROCEDURE — 99999 PR PBB SHADOW E&M-EST. PATIENT-LVL III: CPT | Mod: PBBFAC,,, | Performed by: SURGERY

## 2018-11-09 PROCEDURE — 99024 POSTOP FOLLOW-UP VISIT: CPT | Mod: S$GLB,,, | Performed by: SURGERY

## 2018-11-09 NOTE — PROGRESS NOTES
Dictation #1  MRN:[id number]  CSN:[id number]  SP breast reconstruction with DTI.  She had a small amt of epidermylisis when I saw her two days ago.  She has been putting bacitracin ointment on the incision 4 times a day keeping that area moist.  It is still viable.  There is on 5 by 5mm area that is questionable.  She is on antibiotics.  Drains are functioning well.  Will see again on Monday

## 2018-11-12 ENCOUNTER — OFFICE VISIT (OUTPATIENT)
Dept: PLASTIC SURGERY | Facility: CLINIC | Age: 63
End: 2018-11-12
Payer: COMMERCIAL

## 2018-11-12 VITALS — BODY MASS INDEX: 36.84 KG/M2 | RESPIRATION RATE: 12 BRPM | HEIGHT: 66 IN | HEART RATE: 92 BPM | WEIGHT: 229.25 LBS

## 2018-11-12 DIAGNOSIS — Z09 SURGERY FOLLOW-UP EXAMINATION: Primary | ICD-10-CM

## 2018-11-12 PROCEDURE — 99024 POSTOP FOLLOW-UP VISIT: CPT | Mod: S$GLB,,, | Performed by: SURGERY

## 2018-11-12 PROCEDURE — 99999 PR PBB SHADOW E&M-EST. PATIENT-LVL III: CPT | Mod: PBBFAC,,, | Performed by: SURGERY

## 2018-11-13 ENCOUNTER — OFFICE VISIT (OUTPATIENT)
Dept: SURGERY | Facility: CLINIC | Age: 63
End: 2018-11-13
Payer: COMMERCIAL

## 2018-11-13 VITALS — TEMPERATURE: 98 F | HEIGHT: 66 IN | BODY MASS INDEX: 36.8 KG/M2 | WEIGHT: 229 LBS

## 2018-11-13 DIAGNOSIS — C50.912 MALIGNANT NEOPLASM OF LEFT FEMALE BREAST, UNSPECIFIED ESTROGEN RECEPTOR STATUS, UNSPECIFIED SITE OF BREAST: Primary | ICD-10-CM

## 2018-11-13 PROCEDURE — 99024 POSTOP FOLLOW-UP VISIT: CPT | Mod: S$GLB,,, | Performed by: SURGERY

## 2018-11-13 PROCEDURE — 99999 PR PBB SHADOW E&M-EST. PATIENT-LVL III: CPT | Mod: PBBFAC,,, | Performed by: SURGERY

## 2018-11-13 NOTE — LETTER
[facility]  [address]  [facility] [address]  Phone: [phone]  Fax: [phone] November 22, 2018      Rey Roque MD  [address] [place] [id number]    Patient: Beatriz Faust   MR Number: [id number]   YOB: 1955   Date of Visit: 11/13/2018     Dear Dr. Roque:    Thank you for referring Beatriz Faust to me for evaluation. Attached you will find relevant portions of my assessment and plan of care.    Doing well post-op.  Pathology with left triple positive breast CA with positive intra-op SLNB with total of 1 of 16 axillary nodes after completion L ALND at the time of surgery.  Margins > 5 cm.  Slight ischemia along right IM fold.  Will present at conference.    Refer to both med and rad onc for consults.  PT referral for ROM.  Will need chest port or peripheral pasport placed at some point for adjuvant Herceptin based chemotherapy although patient still expressing some doubt about proceeding with chemotherapy.    If you have questions, please do not hesitate to call me. I look forward to following Beatriz Faust along with you.    Sincerely,      Triston Avendaño MD  Medical Director, [facility]  Staff Attending Surgeon - Department of Surgery  [facility]  Associate Professor of Surgery  [employer]  [employer]    RLC/hcr

## 2018-11-13 NOTE — PROGRESS NOTES
Ms. Faust presents to evaluate her breast incision status post bilateral direct   implant breast reconstruction.  She had some epidermolysis on the right side,   which I was a bit concerned about.  She has been putting triple antibiotic   ointment on that all weekend.  The wound is healing nicely.  I think she should   do well.  We will go ahead and see her again on Friday.      ANGELA/VENANCIO  dd: 11/13/2018 16:36:18 (CST)  td: 11/13/2018 21:21:28 (CST)  Doc ID   #[id number]  Job ID #[id number]    CC:

## 2018-11-13 NOTE — PROGRESS NOTES
GENERAL SURGERY CLINIC  HISTORY AND PHYSICAL    CC:  Left breast cancer     Interval History:  She presents today for post op visit after therapeutic left mastectomy with SLNBx and right prophylactic mastectomy with immediate implant recon with Babycos. She had a right port placed. She is doing well. She saw Sarah in clinic yesterday and he took out one drain.    HPI:  Beatriz Faust is a 63 y.o.  female with no significant PMHx who presents to clinic for evaluation of newly diagnosed Left breast cancer.  She has not had a MMG in ~15 years.  She presented to the ED in early July for abdominal pain and a breast mass was incidentally found on CT scan.  She subsequently underwent a MMG/US and core needle bx which came back as high grade invasive ductal carcinoma ER/IA/Her2 +.  She was originally seen at [facility] where she was recommended to have neoadjuvant chemotherapy.  She presents today for a second opinion.   MRI which showed the tumor size to be 2.5 cm, a PET which did not show additional hypermetabolic activity other than in the known tumor, and after seeing Sarah in clinic to talk about surgical options.     No known family hx of breast cancer    Non-smoker, non-drinker    Gyn Hx:  Menarche 14, menopause ~47,       ROS:  A 10-point review of systems is negative except for the above mentioned in the HPI.     Past Medical History:   Diagnosis Date    Breast cancer 2018       Past Surgical History:   Procedure Laterality Date    AXILLARY NODE DISSECTION Left 10/29/2018    Procedure: LYMPHADENECTOMY, AXILLARY;  Surgeon: Triston Avendaño MD;  Location: [facility] OR [address] [place] [address];  Service: General;  Laterality: Left;    BILATERAL MASTECTOMY Right 10/29/2018    Procedure: MASTECTOMY;  Surgeon: Triston Avendaño MD;  Location: [facility] OR [address] [place] [address];  Service: General;  Laterality: Right;    BIOPSY, LYMPH NODE, SENTINEL Left 10/29/2018    Performed by Triston Avendaño MD at [facility] OR [address] [place] [address]    BREAST  BIOPSY Left 07/2018    BREAST RECONSTRUCTION Bilateral 10/29/2018    Procedure: RECONSTRUCTION, BREAST;  Surgeon: Jatin Smith MD;  Location: [facility] OR [address] [place] [address];  Service: Plastics;  Laterality: Bilateral;    INJECTION FOR SENTINEL NODE IDENTIFICATION Left 10/29/2018    Procedure: INJECTION, FOR SENTINEL NODE IDENTIFICATION;  Surgeon: Triston Avendaño MD;  Location: [facility] OR [address] [place] [address];  Service: General;  Laterality: Left;    INJECTION, FOR SENTINEL NODE IDENTIFICATION Left 10/29/2018    Performed by Triston Avendaño MD at [facility] OR [address] [place] [address]    INSERTION OF BREAST IMPLANT Bilateral 10/29/2018    Procedure: INSERTION, BREAST IMPLANT;  Surgeon: Jatin Smith MD;  Location: [facility] OR [address] [place] [address];  Service: Plastics;  Laterality: Bilateral;    INSERTION OF TUNNELED CENTRAL VENOUS CATHETER (CVC) WITH SUBCUTANEOUS PORT Right 10/29/2018    Procedure: [id number]-CATH with flouro;  Surgeon: Triston Avendaño MD;  Location: [facility] OR [address] [place] [address];  Service: General;  Laterality: Right;    INSERTION, BREAST IMPLANT Bilateral 10/29/2018    Performed by Jatin Smith MD at [facility] OR [address] [place] [address]    [id number]-L-CATH with flouro Right 10/29/2018    Performed by Triston Avendaño MD at [facility] OR [address] [place] [address]    LYMPHADENECTOMY, AXILLARY Left 10/29/2018    Performed by Triston Avendaño MD at [facility] OR [address] [place] [address]    MASTECTOMY Right 10/29/2018    Performed by Triston Avendaño MD at [facility] OR [address] [place] [address]    MASTECTOMY, MODIFIED RADICAL Left 10/29/2018    Performed by Triston Avendaño MD at [facility] OR [address] [place] [address]    MODIFIED RADICAL MASTECTOMY W/ AXILLARY LYMPH NODE DISSECTION Left 10/29/2018    Procedure: MASTECTOMY, MODIFIED RADICAL;  Surgeon: Triston Avendaño MD;  Location: [facility] OR [address] [place] [address];  Service: General;  Laterality: Left;    RECONSTRUCTION, BREAST Bilateral 10/29/2018    Performed by Jatin Smith MD at [facility] OR [address] [place] [address]    SENTINEL LYMPH NODE BIOPSY Left 10/29/2018    Procedure: BIOPSY, LYMPH NODE,  SENTINEL;  Surgeon: Triston Avendaño MD;  Location: [facility] OR [address] [place] [address];  Service: General;  Laterality: Left;    THYROID SURGERY         Social History     Socioeconomic History    Marital status: Single     Spouse name: Not on file    Number of children: Not on file    Years of education: Not on file    Highest education level: Not on file   Social Needs    Financial resource strain: Not on file    Food insecurity - worry: Not on file    Food insecurity - inability: Not on file    Transportation needs - medical: Not on file    Transportation needs - non-medical: Not on file   Occupational History    Not on file   Tobacco Use    Smoking status: Never Smoker    Smokeless tobacco: Never Used   Substance and Sexual Activity    Alcohol use: No     Frequency: Never    Drug use: No    Sexual activity: Not on file   Other Topics Concern    Not on file   Social History Narrative    Not on file       Review of patient's allergies indicates:  No Known Allergies      PHYSICAL EXAM:  Vitals:    11/13/18 1014   Temp: 97.9 °F (36.6 °C)       General: NAD  Neuro: AAOx3  Cardio: RRR  Resp: Breathing even and unlabored  Abd: Soft, ND, NT, no palpable mass, BS+  Ext: Warm and well perfused  Breast:  Well healing biopsy site on the Left.  No palpable masses on Right.  No LAD.        PATH  High grade invasive ductal carcinoma ER/TN+, Her2+      PERTINENT IMAGING:  Reviewed:  ~2x2.5 cm mass at 5:00 of Left breast ~5cm from nipple     MRI  Result:   MRI Breast Bilateral W WO Contrast     History:  Patient is 63 y.o. and is seen for malignant neoplasm of overlapping sites of left breast in female, estrogen receptor positive.       Films Compared:  Compared to: 09/04/2018 Mammo Digital Diagnostic Left with Tomosynthesis_CAD, 08/29/2018 Mammo Previous and 08/29/2018 [facility] Mammo Interpretation Of Outside Films     Technique:  A routine breast MRI was performed with a dedicated breast coil. Pre-contrast STIR were acquired.  Then, pre and post contrast T1 weighted fat saturated images were acquired and subtracted with MIP reconstruction.20 ml of intravenous gadolinium contrast was administered. The study was reviewed with Fila[employer] software.     Findings:  The breasts have scattered fibroglandular tissue. The background parenchymal enhancement is minimal and symmetric.      Left  There is a 26 mm x 22 mm x 19 mm irregularly shaped, heterogeneous mass with spiculated margins seen in the left breast at 5 o'clock in the middle depth, 6 cm from the nipple, 2.6 cm from the skin, and 13.8 cm from the chest wall. Kinetics initial phase is medium. Delayed phase is persistent.      No additional suspicious enhancement is identified in the left breast.      Right  There is no evidence of suspicious masses, abnormal enhancement, or other abnormal findings.      No suspicious internal mammary or axillary adenopathy.      Impression:  Left  Mass: Left breast 26 mm x 22 mm x 19 mm mass at the middle 5 o'clock position. Assessment: 6 - Known biopsy, proven malignancy.      Right  There is no MR evidence of malignancy.     BI-RADS Category:   Overall: 6 - Known Biopsy-Proven Malignancy     Recommendation:  Continued breast surgery management.     FINAL PATHOLOGIC DIAGNOSIS  1. LEFT AXILLARY SENTINEL LYMPH NODE #1, HOT AND BLUE 10,000, DISSECTION:  - Metastatic carcinoma in one of two lymph nodes (1/2).  - Metastatic deposit measures 3 mm.  - Extranodal extension is present.  - See CAP synoptic report.  2. LEFT AXILLARY SENTINEL LYMPH NODE #2, WARM AND BLUE, 4,000, DISSECTION:  - Three lymph nodes, negative for carcinoma (0/3).  - See CAP synoptic report.  3. LEFT AXILLARY SENTINEL LYMPH NODE #3, WARM AND BLUE, 2,000, DISSECTION:  - One lymph node, negative for carcinoma (0/1).  - See CAP synoptic report.  All sentinel lymph node tissue was examined at three levels with routine (H&E) stains and with three epithelial  immunostains (AE1/AE3, Cam5.2,  WSK).  All stains have satisfactory positive and negative controls.  4. LEFT BREAST, MASTECTOMY:  - Invasive ductal carcinoma, grade 3, 2.9 cm.  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  - Biopsy site identified.  - See CAP synoptic report.  SURGICAL PATHOLOGY CANCER CASE SUMMARY- Breast  Procedure: Mastectomy.  Specimen laterality: Left.  Tumor size: 29 mm (greatest dimension).  Histologic type: Invasive ductal carcinoma.  Histologic grade: Grade 3 (glandular/tubular differentiation- 3, nuclear pleomorphism- 3, mitotic rate- 3)  Tumor focality: Unifocal.  Ductal carcinoma in situ: Present, negative for extensive intraductal component (EIC).  Size of DCIS: 0.1 cm.  - Number of blocks with DCIS: 1.  - Number of blocks examined: 13.  Architectural patterns: Solid.  Nuclear grade: High nuclear grade.  Necrosis: Not identified.  Note: The size (extent) of DCIS is an estimation of the volume of breast tissue occupied by DCIS. The DCIS focus  is adjacent to the invasive carcinoma.  Margins-  Uninvolved by invasive carcinoma or DCIS, invasive carcinoma and DCIS are 6.2 cm from the deep margin and 5.6  cm from the anterior surface.  Treatment effect: No known presurgical therapy.  Lymphovascular invasion: Not identified.  Nipple: Uninvolved by invasive carcinoma or DCIS.  Lymph nodes:  Total number of sentinel lymph nodes examined: 6.  Total number of lymph nodes examined (sentinel and non-sentinel): 16.  Number of lymph nodes with Macrometastases (>2 mm): 1.  Number of lymph nodes with Micrometastases (> 0.2 mm to 2 mm and/or > 200 cells): 0.  Number of lymph nodes with isolated tumor cells (less than or equal to 0.2 mm and less than or equal to 200 cells):  0.  Size of largest metastatic deposit: 3 mm.  Extranodal extension: Present.  Pathologic staging (pTNM, AJCC 8th edition): pT2 pN1a.  Ancillary studies- (Performed on biopsy specimen)  ER: Positive (strong, near 100% of tumor cell nuclei).  MA:  "Positive (weak, 2% of tumor cell nuclei).  HER2: Positive (3+).  Ki-67: 40%.  5. LEFT AXILLARY CONTENTS, DISSECTION:  - Ten lymph nodes, negative for carcinoma (0/10).  - See CAP synoptic report.  6. RIGHT BREAST, MASTECTOMY:  - Benign breast tissue and nipple.  - Negative for atypia or malignancy.    ASSESSMENT/PLAN:  Beatriz Faust is a 63 y.o. female with triple positive Left breast cancer now s/p bilateral  Mastectomy, 1/16 positive LNs.  - Hopefully shouldn't need PMRT radiation with only 1/16 + LN  - f/u with med onc for Herceptin based chemo, possibly with Perlelia given "high risk", with endocrine therapy to follow  - f/u with [facility] for drain removal    I have personally taken the history and examined this patient and agree with the resident's note as stated above.  Doing well post-op  Path with L triple positive breast CA with positive intra-op SLNB with total of 1 of 16 axillary nodes after completion L ALND at the time of surgery.  Margins > 5 cm  Slight ischemia along R IM fold.  Will present at conference.  Refer to both med and rad onc for consults  PT referral for ROM  Will need chest port or peripheral pasport placed at some point for adjuvant Herceptin based chemotherapy although patient still expressing some doubt about proceeding with chemotherapy.  "

## 2018-11-14 NOTE — H&P
Ms. Faust presents to evaluate her breast incision status post bilateral direct   implant breast reconstruction.  She had some epidermolysis on the right side,   which I was a bit concerned about.  She has been putting triple antibiotic   ointment on that all weekend.  The wound is healing nicely.  I think she should   do well.  We will go ahead and see her again on Friday.      ANGELA/VENANCIO  dd: 11/13/2018 16:36:18 (CST)  td: 11/13/2018 21:21:28 (CST)  Doc ID   #[id number]  Job ID #[id number]    CC:

## 2018-11-15 ENCOUNTER — PATIENT MESSAGE (OUTPATIENT)
Dept: SURGERY | Facility: CLINIC | Age: 63
End: 2018-11-15

## 2018-11-16 ENCOUNTER — OFFICE VISIT (OUTPATIENT)
Dept: PLASTIC SURGERY | Facility: CLINIC | Age: 63
End: 2018-11-16
Payer: COMMERCIAL

## 2018-11-16 VITALS
BODY MASS INDEX: 37.56 KG/M2 | TEMPERATURE: 98 F | HEART RATE: 56 BPM | SYSTOLIC BLOOD PRESSURE: 132 MMHG | HEIGHT: 66 IN | DIASTOLIC BLOOD PRESSURE: 80 MMHG | WEIGHT: 233.69 LBS

## 2018-11-16 DIAGNOSIS — Z09 SURGERY FOLLOW-UP EXAMINATION: Primary | ICD-10-CM

## 2018-11-16 PROCEDURE — 99999 PR PBB SHADOW E&M-EST. PATIENT-LVL III: CPT | Mod: PBBFAC,,, | Performed by: SURGERY

## 2018-11-16 PROCEDURE — 99024 POSTOP FOLLOW-UP VISIT: CPT | Mod: S$GLB,,, | Performed by: SURGERY

## 2018-11-16 NOTE — PROGRESS NOTES
Vitals:    11/16/18 1147   BP: 132/80   Pulse: (!) 56   Temp: 97.6 °F (36.4 °C)         Continues to do well. B mastectomy flaps viable  One drain removed today. No sign of infection  Will see in clinic on Wednesday

## 2018-11-19 ENCOUNTER — TELEPHONE (OUTPATIENT)
Dept: PLASTIC SURGERY | Facility: CLINIC | Age: 63
End: 2018-11-19

## 2018-11-19 NOTE — TELEPHONE ENCOUNTER
----- Message from Mae SAMI Trinidad sent at 11/19/2018  1:38 PM CST -----  Contact: PT  PT is calling regarding an appointment to be scheduled for Wednesday this week 11/21  PT said she saw Babycos in [place] last and was told by him to have an appointment made on 11/21    Reason: Scar, to make sure it's all good / pt stated she has 2 drains still in    Callback: [phone]

## 2018-11-21 ENCOUNTER — OFFICE VISIT (OUTPATIENT)
Dept: PLASTIC SURGERY | Facility: CLINIC | Age: 63
End: 2018-11-21
Payer: COMMERCIAL

## 2018-11-21 VITALS
WEIGHT: 229.75 LBS | BODY MASS INDEX: 36.92 KG/M2 | DIASTOLIC BLOOD PRESSURE: 80 MMHG | SYSTOLIC BLOOD PRESSURE: 127 MMHG | TEMPERATURE: 98 F | HEART RATE: 71 BPM | HEIGHT: 66 IN

## 2018-11-21 DIAGNOSIS — Z09 SURGERY FOLLOW-UP EXAMINATION: Primary | ICD-10-CM

## 2018-11-21 PROCEDURE — 99024 POSTOP FOLLOW-UP VISIT: CPT | Mod: S$GLB,,, | Performed by: SURGERY

## 2018-11-21 PROCEDURE — 99999 PR PBB SHADOW E&M-EST. PATIENT-LVL III: CPT | Mod: PBBFAC,,, | Performed by: SURGERY

## 2018-11-21 NOTE — PROGRESS NOTES
History & Physical  Plastic Surgery Clinic    SUBJECTIVE:     Chief complaint: follow up bilateral DI on 29 Oct 2018    History of Present Illness:  Patient is a 63 y.o. female presents 3 weeks s/p bilateral DI reconstruction after bilateral mastectomy, postoperative course complicated by wound break down along the IMF mid portion on the right.  Overall doing well, denies fevers, chills, nausea or emesis.  Denies drainage.    Past Medical History:  Past Medical History:   Diagnosis Date    Breast cancer 07/2018       Past Surgical History:  Past Surgical History:   Procedure Laterality Date    AXILLARY NODE DISSECTION Left 10/29/2018    Procedure: LYMPHADENECTOMY, AXILLARY;  Surgeon: Triston Avendaño MD;  Location: [address];  Service: General;  Laterality: Left;    BILATERAL MASTECTOMY Right 10/29/2018    Procedure: MASTECTOMY;  Surgeon: Triston Avendaño MD;  Location: [address];  Service: General;  Laterality: Right;    BIOPSY, LYMPH NODE, SENTINEL Left 10/29/2018    Performed by Triston Avendaño MD at [facility] OR [address] [place] [address]    BREAST BIOPSY Left 07/2018    BREAST RECONSTRUCTION Bilateral 10/29/2018    Procedure: RECONSTRUCTION, BREAST;  Surgeon: Jatin Smith MD;  Location: [address];  Service: Plastics;  Laterality: Bilateral;    INJECTION FOR SENTINEL NODE IDENTIFICATION Left 10/29/2018    Procedure: INJECTION, FOR SENTINEL NODE IDENTIFICATION;  Surgeon: Triston Avendaño MD;  Location: [address];  Service: General;  Laterality: Left;    INJECTION, FOR SENTINEL NODE IDENTIFICATION Left 10/29/2018    Performed by Triston Avendaño MD at [facility] OR [address] [place] [address]    INSERTION OF BREAST IMPLANT Bilateral 10/29/2018    Procedure: INSERTION, BREAST IMPLANT;  Surgeon: Jatin Smith MD;  Location: [address];  Service: Plastics;  Laterality: Bilateral;    INSERTION OF TUNNELED CENTRAL VENOUS CATHETER (CVC) WITH SUBCUTANEOUS PORT Right 10/29/2018    Procedure:  [id number] with flouro;  Surgeon: Triston Avendaño MD;  Location: [facility] OR [address] [place] [address];  Service: General;  Laterality: Right;    INSERTION, BREAST IMPLANT Bilateral 10/29/2018    [facility]    [id number]-CATH with flouro Right 10/29/2018    Performed by Triston Avendaño MD at [facility] OR [address] [place] [address]    LYMPHADENECTOMY, AXILLARY Left 10/29/2018    [facility]    MASTECTOMY Right 10/29/2018    Performed by Triston Avendaño MD at [facility] OR [address] [place] [address]    MASTECTOMY, MODIFIED RADICAL Left 10/29/2018    Performed by Triston Avendaño MD at [facility] OR [address] [place] [address]    MODIFIED RADICAL MASTECTOMY W/ AXILLARY LYMPH NODE DISSECTION Left 10/29/2018    Procedure: MASTECTOMY, MODIFIED RADICAL;  Surgeon: Triston Avendaño MD;  Location: [facility] OR [address] [place] [address];  Service: General;  Laterality: Left;    RECONSTRUCTION, BREAST Bilateral 10/29/2018    Performed by Jatin Smith MD at [facility] OR [address] [place] [address]    SENTINEL LYMPH NODE BIOPSY Left 10/29/2018    Procedure: BIOPSY, LYMPH NODE, SENTINEL;  Surgeon: Triston Avendaño MD;  Location: [facility] OR [address] [place] [address];  Service: General;  Laterality: Left;    THYROID SURGERY         Family History:  No family history on file.    Social History:  Social History     Socioeconomic History    Marital status: Single     Spouse name: None    Number of children: None    Years of education: None    Highest education level: None   Social Needs    Financial resource strain: None    Food insecurity - worry: None    Food insecurity - inability: None    Transportation needs - medical: None    Transportation needs - non-medical: None   Occupational History    None   Tobacco Use    Smoking status: Never Smoker    Smokeless tobacco: Never Used   Substance and Sexual Activity    Alcohol use: No     Frequency: Never    Drug use: No    Sexual activity: None   Other Topics Concern    None   Social History  "Narrative    None       Medications:  Current Outpatient Medications   Medication Sig Dispense Refill    b complex vitamins capsule Take 1 capsule by mouth once daily.      co-enzyme Q-10 30 mg capsule Take 30 mg by mouth 3 (three) times daily.      GREEN TEA EXTRACT ORAL Take by mouth.       No current facility-administered medications for this visit.        Allergies:  Review of patient's allergies indicates:  No Known Allergies    Review of Systems:  Negative except for HPI.      OBJECTIVE:     /80   Pulse 71   Temp 98.3 °F (36.8 °C)   Ht 5' 6" (1.676 m)   Wt 104.2 kg (229 lb 11.5 oz)   BMI 37.08 kg/m²     Physical Exam:  Constitutional: Oriented to person, place, and time. Appears well-developed and well-nourished.   Left breast incision is Clean, dry, intact, no evidence of erythema, induration, or drainage.  No evidence of infection drain in place with apprx 25cc output s/s aday  Right breast incision is Clean, dry, intact, no evidence of erythema, induration, or drainage. EXCEPT for the midportion that has skin breakdown at the mid portion no exposed aloderm or implant.  Drain in place with s/s output apprx 20 cc aday          ASSESSMENT/PLAN:     63 y.o. female overall doing well the skin breakdown area is smaller but still present.    --continue bacitracin to the wound three times a day  --patient will come in Monday for possible drain removal.    BARBI Garcia MD, FACS  Plastic Surgery Fellow  .      "

## 2018-11-26 ENCOUNTER — OFFICE VISIT (OUTPATIENT)
Dept: PLASTIC SURGERY | Facility: CLINIC | Age: 63
End: 2018-11-26
Payer: COMMERCIAL

## 2018-11-26 VITALS — WEIGHT: 233.38 LBS | BODY MASS INDEX: 37.66 KG/M2

## 2018-11-26 DIAGNOSIS — Z09 SURGERY FOLLOW-UP EXAMINATION: Primary | ICD-10-CM

## 2018-11-26 PROCEDURE — 99999 PR PBB SHADOW E&M-EST. PATIENT-LVL II: CPT | Mod: PBBFAC,,, | Performed by: SURGERY

## 2018-11-26 PROCEDURE — 99024 POSTOP FOLLOW-UP VISIT: CPT | Mod: S$GLB,,, | Performed by: SURGERY
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
## 2018-11-27 ENCOUNTER — TUMOR BOARD CONFERENCE (OUTPATIENT)
Dept: SURGERY | Facility: CLINIC | Age: 63
End: 2018-11-27

## 2018-11-27 NOTE — PROGRESS NOTES
Malignant neoplasm of overlapping sites of left breast in female, estrogen receptor positive    7/20/2018 Biopsy     2.4 cm irregular solid mass at 5 o'clock correlating to the mammographic finding         7/20/2018 Initial Diagnosis     BREAST MASS, LEFT (NEEDLE BIOPSY):  Invasive ductal adenocarcinoma, grade 3  Tubule formation 3, nuclear pleomorphism 3, mitosis 2  [place] histologic score 8  Invasive tumor size: 1.2 centimeters  Ductal carcinoma in situ (DCIS, high nuclear grade         7/20/2018 Tumor Markers     Estrogen Receptor: Positive >90%  Progesterone Receptor: Positive 0-10%  HER2: Positive  Ki67: >30%         10/29/2018 Surgery     Left breast mastectomy, pT2 pN1a  Invasive ductal carcinoma, grade 3, 2.9 cm  DCIS, grade 3, 0.1 cm  Margins uninvolved, inv ca and DCIS are 5.6 cm from anterior  1/16 positive lymph nodes, largest focus is 0.3 cm, positive for extranodal extension   Right breast mastectomy: Benign            10/29/2018 Cancer Staged     Cancer Staging  pT2 pN1a.  Stage IB per AJCC 8th ed. pathologic prognostic staging system           11/27/2018 Tumor Conference     Patient was refusing chemotherapy pre-op, however now with positive lymph node she is willing to discuss. Will need port placement and HER2 guided therapy.     Chances of cure with surgery alone is 30-40%. With chemotherapy, could reduce this by about half, improving her outcome by about 25%.     Would recommend Herceptin and Perjeta (if approved by her insurance) possibly followed by Neratinib. Neratinib may be less helpful in ER+ setting.      Radiation not indicated.                Breast cancer

## 2018-11-27 NOTE — PROGRESS NOTES
Ms. Faust presents to followup of her breast reconstruction.  She had   deepithelialization in inferior aspect of the right breast.  This is healing   nicely now.  There is absolutely no sign of infection.  The drains have been   less than 20 mL for multiple days.  Both drains were removed.  She will return   to clinic in [place] on Friday.      ANGELA/VENANCIO  dd: 11/27/2018 10:02:55 (CST)  td: 11/27/2018 11:10:24 (CST)  Doc ID   #[id number]  Job ID #[id number]    CC:

## 2018-11-29 ENCOUNTER — OFFICE VISIT (OUTPATIENT)
Dept: SURGERY | Facility: CLINIC | Age: 63
End: 2018-11-29
Payer: COMMERCIAL

## 2018-11-29 ENCOUNTER — CLINICAL SUPPORT (OUTPATIENT)
Dept: REHABILITATION | Facility: HOSPITAL | Age: 63
End: 2018-11-29
Payer: COMMERCIAL

## 2018-11-29 VITALS
WEIGHT: 230.63 LBS | RESPIRATION RATE: 20 BRPM | BODY MASS INDEX: 37.06 KG/M2 | HEART RATE: 74 BPM | HEIGHT: 66 IN | DIASTOLIC BLOOD PRESSURE: 83 MMHG | TEMPERATURE: 98 F | SYSTOLIC BLOOD PRESSURE: 118 MMHG

## 2018-11-29 DIAGNOSIS — C50.011 MALIGNANT NEOPLASM OF AREOLA OF RIGHT BREAST IN FEMALE, UNSPECIFIED ESTROGEN RECEPTOR STATUS: ICD-10-CM

## 2018-11-29 DIAGNOSIS — M25.612 DECREASED SHOULDER MOBILITY, LEFT: ICD-10-CM

## 2018-11-29 DIAGNOSIS — Z91.89 AT RISK FOR LYMPHEDEMA: ICD-10-CM

## 2018-11-29 DIAGNOSIS — C50.812 MALIGNANT NEOPLASM OF OVERLAPPING SITES OF LEFT BREAST IN FEMALE, ESTROGEN RECEPTOR POSITIVE: Primary | ICD-10-CM

## 2018-11-29 DIAGNOSIS — R00.2 PALPITATIONS: ICD-10-CM

## 2018-11-29 DIAGNOSIS — Z17.0 MALIGNANT NEOPLASM OF OVERLAPPING SITES OF LEFT BREAST IN FEMALE, ESTROGEN RECEPTOR POSITIVE: Primary | ICD-10-CM

## 2018-11-29 PROCEDURE — 97162 PT EVAL MOD COMPLEX 30 MIN: CPT | Mod: PO | Performed by: PHYSICAL MEDICINE & REHABILITATION

## 2018-11-29 PROCEDURE — 97140 MANUAL THERAPY 1/> REGIONS: CPT | Mod: PO | Performed by: PHYSICAL MEDICINE & REHABILITATION

## 2018-11-29 PROCEDURE — 99205 OFFICE O/P NEW HI 60 MIN: CPT | Mod: S$GLB,,, | Performed by: INTERNAL MEDICINE

## 2018-11-29 PROCEDURE — 99999 PR PBB SHADOW E&M-EST. PATIENT-LVL III: CPT | Mod: PBBFAC,,, | Performed by: INTERNAL MEDICINE

## 2018-11-29 PROCEDURE — 97110 THERAPEUTIC EXERCISES: CPT | Mod: PO | Performed by: PHYSICAL MEDICINE & REHABILITATION

## 2018-11-29 NOTE — PATIENT INSTRUCTIONS
POST OP PATIENT EDUCATION        Lymphedema - Identification and Prevention     Lymphedema - is the swelling of a body area or extremity caused by the accumulation of lymphatic fluid.  There is a risk for lymphedema with the removal of lymph nodes, trauma or radiation therapy.  Treatment of breast cancer often involves surgery: mastectomy or lumpectomy. Some of the lymph nodes in the underarm (called axillary lymph nodes) may be removed and checked to see if they contain cancer cells.     During breast surgery when axillary lymph nodes are removed (with sentinel node biopsy or axillary dissection) or are treated with radiation therapy, the lymphatic system may become impaired. This may prevent lymphatic fluid from leaving the area therefore, causing lymphedema.     Lymphatic fluid is a normal part of the circulatory system. Its function is to remove waste products and to produce cells vital to fighting infection. Swelling occurs when the vessels become restricted and the lymphatic fluid is unable to freely flow through them.  If lymphedema is left untreated, the affected limb could progressively become more swollen, which could lead to hardening of the skin, bulkiness in the limb, infection and impaired wound healing.         There are things you can do to decrease the chance of developing lymphedema.                                          [email or web address].org/riskreduction                                                                                                                                                  The information presented is intended for general information and educational purposes. It is not intended to replace the  advice of your health care provider. Contact your health care provider if you believe you have a health problem.                                                    POST OP EXERCISES - SAFE TO DO THE FIRST 2 WEEKS AFTER SURGERY UNTIL YOUR FOLLOW UP  APPOINTMENT WITH PHYSICAL/OCCUPATIONAL THERAPY        Exaggerated Breathing and Relaxation      Practice deep breathing frequently in the first few days following surgery even before you begin exercising. This exercise helps with tissue extensibility in the chest wall.  Inhale slowly and deeply through the nose and exhale through pursed lips. Concentrate on relaxing as you let the air out of your lungs. Repeat three (3) to four (4) times, remembering to breath in deeply and then relaxing. This exercise helps to ease the sensation of pulling and discomfort that may be experienced while exercising.    Lumbar Rotation       Feet on floor, slowly rock knees from side to side in small, pain-free range of motion. Allow lower back to rotate slightly, but keep shoulders flat on ground.  Repeat each side. Repeat 10 times per side. Do 2 sessions per day.          Knee-to-Chest Stretch: Bilateral      With hands behind knees, pull both knees in to chest until a comfortable stretch is felt in lower back and buttocks. Keep back relaxed.   Repeat __10__ times per set. Do __1__ sets per session. Do __2__ sessions per day.      Extension in Standing    Place hands on back of hips and lean back, pushing hips gently forward. Repeat slowly and continuously.  Repeat 10 times per set. Do 1 sets per session. Do 2 sessions per day.            ROM: Flexion - Wand (Supine)        Lie on back holding wand. Raise arms over head.   Repeat _10___ times per set. Do _1___ sets per session. Do __2__ sessions per day.     [email or web address]    ROM: External Rotation - Wand (supine)    Lie on back holding wand with elbows bent to 90°. Rotate forearms over head as far as possible.   Repeat __10__ times per set. Do __1__ sets per session. Do ____ sessions per day.     [email or web address]     Copyright © [employer]. All rights reserved.              Sidelying Shoulder Abduction            Sidelying Shoulder Abduction    Lie on your right/left side  with right/left arm on top. Keep your elbow straight. Lift your arm upward toward your head.  Perform __10__ times. Perform __1__sets.   Perform __2__ times per day.  Copyright © 5441-5741 [employer]ROM:       Scapular Retraction (Standing)    With arms at sides, squeeze shoulder blades together. Do not shrug shoulders and do not hold your breath. Hold 5 seconds. Repeat 10 times 1 sessions  2 x day.
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
## 2018-11-29 NOTE — PROGRESS NOTES
PATIENT: Beatriz Faust  MRN: [id number]  DATE: 11/29/2018        Chief Complaint: No chief complaint on file.  presents for adjuvant therapy discussion after new diagnosis of breast cancer    Oncologic History:      Oncologic History See below    Oncologic Treatment Surgery to date    Pathology [id number] ductal      Initially evaluated by medical oncology at an OSH and declined NA chemotherapy at that time    - no mammograms in > 15 years  - presented to outside ED in early July 2018 for abdominal pain and incidentally a breast mass was seen on CT  - at an OSH she underwent a MMG/US and core needle bx which came back as high grade invasive ductal carcinoma ER/MI/Her2 +.    - She was originally seen at [facility] where she was recommended to have neoadjuvant chemotherapy which she declined  - 9/4/18 Breast MRI:  Impression:  Left  Mass: Left breast 26 mm x 22 mm x 19 mm mass at the middle 5 o'clock position. Assessment: 6 - Known biopsy, proven malignancy.   Right  There is no MR evidence of malignancy.  BI-RADS Category:   Overall: 6 - Known Biopsy-Proven Malignancy  - 9/8/18 PET scan:  Findings:  Hypermetabolic 2.5 cm left breast mass with SUV max of 4.7.  No additional abnormal foci of increased tracer uptake.  Physiologic uptake of the tracer is present within the brain, salivary glands, myocardium, GI and  tracts.  Incidental CT findings: 5 mm pulmonary nodule in the right lower lobe without corresponding hypermetabolic activity, and below the size threshold for PET sensitivity.  Impression:  Hypermetabolic 2.5 cm left breast mass compatible with history of invasive ductal carcinoma.    Malignant neoplasm of overlapping sites of left breast in female, estrogen receptor positive     7/20/2018 Biopsy       2.4 cm irregular solid mass at 5 o'clock correlating to the mammographic finding            7/20/2018 Initial Diagnosis       BREAST MASS, LEFT (NEEDLE BIOPSY):  Invasive ductal adenocarcinoma, grade 3  Tubule  formation 3, nuclear pleomorphism 3, mitosis 2  [place] histologic score 8  Invasive tumor size: 1.2 centimeters  Ductal carcinoma in situ (DCIS, high nuclear grade            7/20/2018 Tumor Markers       Estrogen Receptor: Positive >90%  Progesterone Receptor: Positive 0-10%  HER2: Positive  Ki67: >30%            10/29/2018 Surgery       · Left breast mastectomy, pT2 pN1a  · Invasive ductal carcinoma, grade 3, 2.9 cm  · DCIS, grade 3, 0.1 cm  · Margins uninvolved, inv ca and DCIS are 5.6 cm from anterior  · 1/16 positive lymph nodes, largest focus is 0.3 cm, positive for extranodal extension   · Right breast mastectomy: Benign                10/29/2018 Cancer Staged       Cancer Staging  pT2 pN1a.  Stage IB per AJCC 8th ed. pathologic prognostic staging system               11/27/2018 Tumor Conference Documentation       Patient declined chemotherapy pre-op, however now with positive lymph node she is willing to discuss. Will need port placement and HER2 guided therapy.      Chances of cure with surgery alone is 30-40%. With chemotherapy, could reduce this by about half, improving her outcome by about 25%.      Would recommend Herceptin and Perjeta (if approved by her insurance) possibly followed by Neratinib. Neratinib may be less helpful in ER+ setting.       Radiation not indicated.      Active Ambulatory Problems     Diagnosis Date Noted    Malignant neoplasm of overlapping sites of left breast in female, estrogen receptor positive 09/02/2018    Breast cancer 10/29/2018     Resolved Ambulatory Problems     Diagnosis Date Noted    No Resolved Ambulatory Problems     Past Medical History:   Diagnosis Date    Breast cancer 07/2018       Subjective:   Interval History: Ms. Faust is a 63 y.o. female who returns for new evaluation and treatment of [id number] + T2N1a breast cancer  She has healed up well from surgery.     Past Medical History:   Past Medical History:   Diagnosis Date    Breast cancer 07/2018        Past Surgical HIstory:   Past Surgical History:   Procedure Laterality Date    AXILLARY NODE DISSECTION Left 10/29/2018    Procedure: LYMPHADENECTOMY, AXILLARY;  Surgeon: Triston Avendaño MD;  Location: [facility] OR [address] [place] [address];  Service: General;  Laterality: Left;    BILATERAL MASTECTOMY Right 10/29/2018    Procedure: MASTECTOMY;  Surgeon: Triston Avendaño MD;  Location: [facility] OR [address] [place] [address];  Service: General;  Laterality: Right;    BIOPSY, LYMPH NODE, SENTINEL Left 10/29/2018    Performed by Triston Avendaño MD at [facility] [address] [place] [address]    BREAST BIOPSY Left 07/2018    BREAST RECONSTRUCTION Bilateral 10/29/2018    Procedure: RECONSTRUCTION, BREAST;  Surgeon: Jatin Smith MD;  Location: [facility] OR [address] [place] [address];  Service: Plastics;  Laterality: Bilateral;    INJECTION FOR SENTINEL NODE IDENTIFICATION Left 10/29/2018    Procedure: INJECTION, FOR SENTINEL NODE IDENTIFICATION;  Surgeon: Triston Avendaño MD;  Location: [facility] OR [address] [place] [address];  Service: General;  Laterality: Left;    INJECTION, FOR SENTINEL NODE IDENTIFICATION Left 10/29/2018    Performed by Triston Av[facility]ño MD at [facility] OR [address] [place] [address]    INSERTION OF BREAST IMPLANT Bilateral 10/29/2018    Procedure: INSERTION, BREAST IMPLANT;  Surgeon: Jatin Smith MD;  Location: [address];  Service: Plastics;  Laterality: Bilateral;    INSERTION OF TUNNELED CENTRAL VENOUS CATHETER (CVC) WITH SUBCUTANEOUS PORT Right 10/29/2018    Procedure: [id number] with flouro;  Surgeon: Triston Avendaño MD;  Location: [address];  Service: General;  Laterality: Right;    INSERTION, BREAST IMPLANT Bilateral 10/29/2018    Performed by Jatin Smith MD at [facility] OR [address] [place] [address]    [id number]-CATH with flouro Right 10/29/2018    Performed by Triston Avendaño MD at [facility] OR [address] [place] [address]    LYMPHADENECTOMY, AXILLARY Left 10/29/2018    Performed by Triston vAendaño MD at [facility] [address] [place] [address]    MASTECTOMY Right 10/29/2018    [facility]istno LEACH  [facility]    MASTECTOMY, MODIFIED RADICAL Left 10/29/2018    [facility]    MODIFIED RADICAL MASTECTOMY W/ AXILLARY LYMPH NODE DISSECTION Left 10/29/2018    Procedure: MASTECTOMY, MODIFIED RADICAL;  Surgeon: Triston Avendaño MD;  Location: [facility] OR [address] [place] [address];  Service: General;  Laterality: Left;    RECONSTRUCTION, BREAST Bilateral 10/29/2018    [facility]    SENTINEL LYMPH NODE BIOPSY Left 10/29/2018    Procedure: BIOPSY, LYMPH NODE, SENTINEL;  Surgeon: Triston Avendaño MD;  Location: [facility] OR [address] [place] [address];  Service: General;  Laterality: Left;    THYROID SURGERY       Gyn Hx:    Menarche -13    No ocps  Menopause in 40s, last period in late 40s  No HRT    Family History:   No breast cancer  No uterine or ovarian cancer known  Maternal uncle-  of metastatic colon cancer in 70s  No prostate cancer known  4 siblings- 1 sister with a seizure disorder, 1 brother - DM  Mother living at age 85, pre DM, arthritis, knee and hip replacement, prior colon polyps  Father-  at age 72- mesothelioma, work related    Social History:  reports that  has never smoked. she has never used smokeless tobacco. She reports that she does not drink alcohol or use drugs.  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      co-enzyme Q-10 30 mg capsule Take 30 mg by mouth 3 (three) times daily.      GREEN TEA EXTRACT ORAL Take by mouth.       No current facility-administered medications for this visit.        Review of Systems   Constitutional: Negative for activity change, chills, fatigue, fever and unexpected weight change (dropped 30 lbs with  diet changes).   HENT: Positive for sinus pressure (off and on). Negative for congestion, hearing loss, sore throat and trouble swallowing.    Eyes: Negative for visual disturbance.   Respiratory: Negative for cough, shortness of breath and wheezing.    Cardiovascular: Positive for palpitations (notes a rare racing heart). Negative for chest pain and leg swelling.   Gastrointestinal: Negative for abdominal distention, abdominal pain, blood in stool, constipation (manages with diet), diarrhea, nausea and vomiting.        Prior colonoscopy- at [facility] > 10 years   Genitourinary: Negative for decreased urine volume, difficulty urinating, dysuria, frequency, hematuria and urgency.   Musculoskeletal: Positive for arthralgias (knees and hips- chronic) and gait problem (see above). Negative for back pain.   Skin: Positive for wound. Negative for color change, pallor and rash.   Neurological: Negative for dizziness, weakness, numbness (at surgical site) and headaches.   Hematological: Negative for adenopathy. Does not bruise/bleed easily.   Psychiatric/Behavioral: Negative for dysphoric mood and sleep disturbance. The patient is not nervous/anxious.        ECOG Performance Status: 0    Objective:      Vitals: There were no vitals filed for this visit.  BMI: There is no height or weight on file to calculate BMI.    Physical Exam   Constitutional: She is oriented to person, place, and time. She appears well-developed and well-nourished. No distress.   Presents with her sister   HENT:   Head: Normocephalic and atraumatic.   Eyes: Conjunctivae and EOM are normal. Pupils are equal, round, and reactive to light. No scleral icterus. Right pupil is round and reactive. Left pupil is round and reactive.   Neck: Normal range of motion. Neck supple. No thyromegaly present.   Cardiovascular: Normal rate, regular rhythm, normal heart sounds and intact distal pulses. Exam reveals no gallop and no friction rub.   No murmur  heard.  Pulmonary/Chest: Effort normal and breath sounds normal. No respiratory distress. She has no wheezes. She has no rales.   Breasts- post bilateral mastectomies with reconstruction  Healed well  Rt chest wall port   Abdominal: Soft. Bowel sounds are normal. She exhibits no distension and no mass. There is no hepatosplenomegaly. There is no tenderness. There is no rebound and no guarding.   No organomegaly  Yeast infection skin fold   Musculoskeletal: Normal range of motion. She exhibits no edema, tenderness or deformity.   Lymphadenopathy:        Head (right side): No submandibular adenopathy present.        Head (left side): No submandibular adenopathy present.     She has no cervical adenopathy.        Right cervical: No superficial cervical, no deep cervical and no posterior cervical adenopathy present.       Left cervical: No superficial cervical, no deep cervical and no posterior cervical adenopathy present.        Right: No inguinal and no supraclavicular adenopathy present.        Left: No inguinal and no supraclavicular adenopathy present.   Neurological: She is alert and oriented to person, place, and time. She has normal strength and normal reflexes. No cranial nerve deficit or sensory deficit. Coordination normal.   Skin: Skin is warm and dry. No bruising, no lesion, no petechiae and no rash noted. She is not diaphoretic. No erythema. No pallor.   Psychiatric: She has a normal mood and affect. Her behavior is normal. Judgment and thought content normal. Her mood appears not anxious. She does not exhibit a depressed mood.   Nursing note and vitals reviewed.      Laboratory Data:  No visits with results within 1 Week(s) from this visit.   Latest known visit with results is:   Lab Visit on 10/24/2018   Component Date Value Ref Range Status    Sodium 10/24/2018 141  136 - 145 mmol/L Final    Potassium 10/24/2018 4.4  3.5 - 5.1 mmol/L Final    Chloride 10/24/2018 106  95 - 110 mmol/L Final    CO2  10/24/2018 27  23 - 29 mmol/L Final    Glucose 10/24/2018 89  70 - 110 mg/dL Final    BUN, Bld 10/24/2018 17  8 - 23 mg/dL Final    Creatinine 10/24/2018 0.8  0.5 - 1.4 mg/dL Final    Calcium 10/24/2018 10.0  8.7 - 10.5 mg/dL Final    Anion Gap 10/24/2018 8  8 - 16 mmol/L Final    eGFR if African American 10/24/2018 >60.0  >60 mL/min/1.73 m^2 Final    eGFR if non African American 10/24/2018 >60.0  >60 mL/min/1.73 m^2 Final    Comment: Calculation used to obtain the estimated glomerular filtration  rate (eGFR) is the CKD-EPI equation.       WBC 10/24/2018 6.09  3.90 - 12.70 K/uL Final    RBC 10/24/2018 4.93  4.00 - 5.40 M/uL Final    Hemoglobin 10/24/2018 13.6  12.0 - 16.0 g/dL Final    Hematocrit 10/24/2018 42.7  37.0 - 48.5 % Final    MCV 10/24/2018 87  82 - 98 fL Final    MCH 10/24/2018 27.6  27.0 - 31.0 pg Final    MCHC 10/24/2018 31.9* 32.0 - 36.0 g/dL Final    RDW 10/24/2018 14.5  11.5 - 14.5 % Final    Platelets 10/24/2018 284  150 - 350 K/uL Final    MPV 10/24/2018 11.4  9.2 - 12.9 fL Final    Immature Granulocytes 10/24/2018 0.2  0.0 - 0.5 % Final    Gran # (ANC) 10/24/2018 3.9  1.8 - 7.7 K/uL Final    Immature Grans (Abs) 10/24/2018 0.01  0.00 - 0.04 K/uL Final    Comment: Mild elevation in immature granulocytes is non specific and   can be seen in a variety of conditions including stress response,   acute inflammation, trauma and pregnancy. Correlation with other   laboratory and clinical findings is essential.      Lymph # 10/24/2018 1.7  1.0 - 4.8 K/uL Final    Mono # 10/24/2018 0.3  0.3 - 1.0 K/uL Final    Eos # 10/24/2018 0.1  0.0 - 0.5 K/uL Final    Baso # 10/24/2018 0.07  0.00 - 0.20 K/uL Final    nRBC 10/24/2018 0  0 /100 WBC Final    Gran% 10/24/2018 63.8  38.0 - 73.0 % Final    Lymph% 10/24/2018 28.1  18.0 - 48.0 % Final    Mono% 10/24/2018 4.8  4.0 - 15.0 % Final    Eosinophil% 10/24/2018 2.0  0.0 - 8.0 % Final    Basophil% 10/24/2018 1.1  0.0 - 1.9 % Final     Differential Method 10/24/2018 Automated   Final     Imaging: reviewed, see above  Assessment:     1. Malignant neoplasm of overlapping sites of left breast in female, estrogen receptor positive    2. Palpitations      Plan:   1. We discussed adjuvant TCH-Perjeta followed by completion of 1 year of Herceptin/Perjeta  This will be followed by adjuvant endocrine therapy +/- nerlynx  2. Cardiology referral to Dr. Garcia  Will need ECHO pre chemotherapy    Patient mentioned male pattern hair growth- chin particularly- we will address this further post chemo    Provides cancer policy paperwork needing completion- completed today  12/10 rt work    Distress Screening Results: Psychosocial Distress screening score of Distress Score: 5 noted and reviewed. No intervention indicated.

## 2018-11-29 NOTE — Clinical Note
- needs cardiology appt with Dr. Barbara gonzalez- ECHO same day and labs- RTC post above to consent for chemotherapy

## 2018-11-30 ENCOUNTER — TELEPHONE (OUTPATIENT)
Dept: HEMATOLOGY/ONCOLOGY | Facility: CLINIC | Age: 63
End: 2018-11-30

## 2018-11-30 DIAGNOSIS — Z51.11 ENCOUNTER FOR CHEMOTHERAPY MANAGEMENT: Primary | ICD-10-CM

## 2018-11-30 DIAGNOSIS — Z87.898 HISTORY OF PALPITATIONS: Primary | ICD-10-CM

## 2018-11-30 NOTE — TELEPHONE ENCOUNTER
Called patient gave her the dates and times for the cardiology & echo 12/7 appointments. Scheduled her for the chemo class on Monday 12/3. Consent signing on 12/10.

## 2018-12-03 ENCOUNTER — CLINICAL SUPPORT (OUTPATIENT)
Dept: HEMATOLOGY/ONCOLOGY | Facility: CLINIC | Age: 63
End: 2018-12-03
Payer: COMMERCIAL

## 2018-12-03 NOTE — PROGRESS NOTES
Pt arrived for chemotherapy class. Binder and appt calendar given, chemotherapy video shown, medication regimen information given (Perjeta + Herceptin + Taxotere + Carbo), nutrition discussed with Yamel IVERSON dietician, and MSW (Sari)  discussed  available.Tour given of infusion center. 1st chemotherapy not scheduled at this time. No additional questions or concerns voiced. Will F/U at chairside at 1st scheduled  chemotherapy.

## 2018-12-05 ENCOUNTER — OFFICE VISIT (OUTPATIENT)
Dept: PLASTIC SURGERY | Facility: CLINIC | Age: 63
End: 2018-12-05
Payer: COMMERCIAL

## 2018-12-05 ENCOUNTER — CLINICAL SUPPORT (OUTPATIENT)
Dept: REHABILITATION | Facility: HOSPITAL | Age: 63
End: 2018-12-05
Payer: COMMERCIAL

## 2018-12-05 VITALS
HEIGHT: 66 IN | BODY MASS INDEX: 37.15 KG/M2 | SYSTOLIC BLOOD PRESSURE: 134 MMHG | TEMPERATURE: 98 F | WEIGHT: 231.13 LBS | DIASTOLIC BLOOD PRESSURE: 81 MMHG | HEART RATE: 67 BPM

## 2018-12-05 DIAGNOSIS — Z91.89 AT RISK FOR LYMPHEDEMA: ICD-10-CM

## 2018-12-05 DIAGNOSIS — C50.519 MALIGNANT NEOPLASM OF LOWER-OUTER QUADRANT OF FEMALE BREAST, ESTROGEN RECEPTOR NEGATIVE, UNSPECIFIED LATERALITY: ICD-10-CM

## 2018-12-05 DIAGNOSIS — Z17.0 MALIGNANT NEOPLASM OF OVERLAPPING SITES OF LEFT BREAST IN FEMALE, ESTROGEN RECEPTOR POSITIVE: ICD-10-CM

## 2018-12-05 DIAGNOSIS — Z09 SURGERY FOLLOW-UP EXAMINATION: Primary | ICD-10-CM

## 2018-12-05 DIAGNOSIS — C50.812 MALIGNANT NEOPLASM OF OVERLAPPING SITES OF LEFT BREAST IN FEMALE, ESTROGEN RECEPTOR POSITIVE: ICD-10-CM

## 2018-12-05 DIAGNOSIS — M25.612 DECREASED SHOULDER MOBILITY, LEFT: ICD-10-CM

## 2018-12-05 DIAGNOSIS — Z17.1 MALIGNANT NEOPLASM OF LOWER-OUTER QUADRANT OF FEMALE BREAST, ESTROGEN RECEPTOR NEGATIVE, UNSPECIFIED LATERALITY: ICD-10-CM

## 2018-12-05 PROCEDURE — 99024 POSTOP FOLLOW-UP VISIT: CPT | Mod: S$GLB,,, | Performed by: PHYSICIAN ASSISTANT

## 2018-12-05 PROCEDURE — 97110 THERAPEUTIC EXERCISES: CPT | Mod: PO

## 2018-12-05 PROCEDURE — 99999 PR PBB SHADOW E&M-EST. PATIENT-LVL III: CPT | Mod: PBBFAC,,, | Performed by: PHYSICIAN ASSISTANT

## 2018-12-05 PROCEDURE — 97140 MANUAL THERAPY 1/> REGIONS: CPT | Mod: PO

## 2018-12-05 NOTE — PATIENT INSTRUCTIONS
Wall Wash - Flexion  Using a towel, slide your arm up the wall until  a stretch is felt in your shoulder.    Repeat 10 Times  Hold 3 Seconds  Complete 1 Set  Perform 1 Time(s) a Day        Wall slides abduction  Slide hand up wall to your side, as shown    Repeat 10 Times  Hold 3 Seconds  Complete 1 Set  Perform 1 Time(s) a Day          Doorway Pec Stretch, Low   a doorway of standard width. Place  forearms against door frame, with elbows  oriented slightly BELOW shoulders. Step  forward with one leg, shifting body weight  forward through doorway until moderate  stretch is felt in the front of the shoulders.    Repeat 2 Times  Hold 30 Seconds  Complete 1 Set  Perform 1 Time(s) a Day

## 2018-12-05 NOTE — PROGRESS NOTES
"                                                    Physical Therapy Daily Treatment Note     Name: Beatriz Faust  Clinic Number: [id number]  Diagnosis:   Encounter Diagnoses   Name Primary?    Decreased shoulder mobility, left     At risk for lymphedema     Malignant neoplasm of lower-outer quadrant of female breast, estrogen receptor negative, unspecified laterality     Malignant neoplasm of overlapping sites of left breast in female, estrogen receptor positive      Physician: Triston Avendaño MD    Precautions: Standard, cancer  Visit #: 2 of 20  Time In: 14:11  Time Out: 15:00  Total Treatment Time 1:1: 49 minutes    Evaluation Date: 11/29/18  Visit # authorized: 20  Authorization period: 12/31/18  Plan of care Expiration: 1/24/18  MD referral: Triston Avendaño  Insurance: [facility]       Subjective     Pt reports: "Now Jud wanted me walking 30min, 5 days per week. I didn't do that, but I have increased my mobility." Pt also states that she has done some of her home exercise program, "look, my range of motion is so much better!"  Pain Scale: Beatriz rates pain on a scale of 0/10 on VAS.   Pain location: N/A    Fatigue: none reported  Functional change: pt endorses improve B shoulder ROM.  Treatment: 10/29/18 bilateral mastectomy with SLNB and ALND (16)  and pre-pectoralis implant reconstruction and insertion of Port- a - cath. Chemotherapy pending. No radiation.  Surgery date: 10/29/18      Objective     Beatriz received individual therapeutic exercises to improve postural correction and alignment, stretching and soft tissue mobility, and strengthening for 26 minutes including the following:     LTR c/ hands behind head      1 x 10  DKTC   1 x 10- NP  Standing shoulder extension with wand  1 x 10  Supine Shld Flexion with wand    1 x 10  Supine Shld ER with wand          1 x 10  Sidelying Shld Abd (B)                 1 x 10  Scap Retractions  c/ YTB          10 x 5"  Wall Slides Flexion (B)   1 x " 10   Wall slides Abd (B)   1 x10  Pec stretch in doorway  3 x 30s    Beatriz received the following manual therapy techniques were performed to increased myofascial/soft tissue length, mobility and pliability, increase PROM, AROM and function as well as to decrease pain for 23 minutes    Stretching, bending, twisting for left axillary and upper arm cording- L axilla  Lateral chest wall Stretch (B)-NP  Grade I-II G-H joint mobs (B)     Posterior Capsule Stretch  (B)  Passive Stretch all shoulder Motions (B)    ctive /Passive ROM Right Left   Flexion 168 174   Abduction 150 160   Extension 70 70   IR/90deg 90 90   ER/90deg 78 82        CMS Impairment/Limitation/Restriction for FOTO Outcome Survey     Therapist reviewed FOTO scores for Beatriz Faust on 12/5/2018.   FOTO documents entered into [employer] - see Media section.     Limitations Score: 37%  Category: Carrying      Home Exercise Program and Patient Education   Education provided re:  - role of PT in multi - disciplinary team, goals for PT  - progress towards goals   -bolded exercises added to HEP    See EMR under notes/patient instructions for HEP given/taught this session - all sets and reps included. Pt received printed copy.     Pt was able to demonstrate and report understanding and performance  Pt has no cultural, educational or language barriers to learning provided.    Assessment     Patient is responding well to physical therapy.  Is at expected function and pain level for 5 weeks post operatively. Pt tolerated session well, and responded well to manual technique- presenting with improved L axillary cording at most inferior cord. Pt has been compliant with HEP and demo's improved B shoulder ROM, allowing for improved ability for reaching overhead, etc. Pt has met goals as noted below. Will continue to progress all interventions as tolerated.  Pain after treatment: 0/10    Pt prognosis is Excellent. Pt will continue to benefit from skilled outpatient  physical therapy to address the deficits listed in the problem list chart on initial evaluation, provide pt/family education and to maximize pt's level of independence in the home and community environment.     Medical necessity is demonstrated by the following IMPAIRMENTS/PROMBLEM LIST:          History  Co-morbidities and personal factors that may impact the plan of care Examination  Body Structures and Functions, activity limitations and participation restrictions that may impact the plan of care      Clinical Presentation   Co-morbidities:   Breast cancer  S/p bilateral mastectomies with implant reconstruction              Personal Factors:   no deficits Body Regions:   upper extremities     Body Systems:   ROM  edema  scar formation           Participation Restrictions:   Decreased shoulder mobility with lifting upward       Activity limitations:   Mobility  lifting and carrying objects     Self care  dressing     Domestic Life  doing house work (cleaning house, washing dishes, laundry)     Interactions/Relationships  no deficits     Life Areas  no deficits     Community and Social Life  no deficits             evolving clinical presentation with changing clinical characteristics                                moderate   moderate   moderate Decision Making/ Complexity Score:  moderate         Goals: Pt agrees with goals set     Short Term goals: 4 weeks  1. Patient will demonstrate 100% understanding of lymphedema risk reduction practices to include self monitoring for lymphedema. (progressing, not met)  2. Patient will demonstrate independence with Home Exercise program established. (progressing, not met)  3. Pt will increase AROM/PROM in shoulder abduction ROM to 150 degrees bilaterally to improve functional reach, carry, push, pull pain free. (Met 12/5)  4. Pt will increase AROM/PROM in shoulder flexion to 160 degrees bilaterally to improve functional reach, carry, push, pull pain free.(progressing, not  met)  5. Pt will increase AROM/PROM in shoulder ER to 80 degrees bilaterally in order to perform functional activites (progressing, not met)     Long Term Goals: 8 weeks   1.  Pt will increase AROM/PROM in shoulder flexion to 180 degrees bilaterally to improve functional reach, carry, push, pull pain free. (progressing, not met)  2. Pt will increase AROM/PROM in shoulder ER to 90 degrees bilaterally in order to perform  functional activities pain free. (progressing, not met)  3. Pt will demonstrate full/maximized tissue mobility to increase ROM and promote healthy tissue to be pain free at discharge. (progressing, not met)  4. Pt will report decrease in overall worst pain to 2/10 at discharge. (progressing, not met)  5. Pt will increase AROM/PROM in shoulder abduction ROM to 180 degrees bilaterally to improve functional reach, carry, push, pull pain free. (progressing, not met)  6. Patient will report compliance with walking program 5x week for 10 min each day to improve overall cardiovascular function and decrease cancer related fatigue at discharge. (progressing, not met)             Plan     Continue PT POC.    Therapist: Katelyn Grey, PT  12/5/2018

## 2018-12-05 NOTE — PROGRESS NOTES
Beatriz Faust presents [facility] on [facility]/2018 for a follow up visit status post B breast reconstruction with silicone implant placement on 10/29/2018. She is doing well today with no issues since her last visit. She denies fever, chills, N/V or other systemic signs of infection.     Review of patient's allergies indicates:  No Known Allergies  Current Outpatient Medications on File Prior to Visit   Medication Sig Dispense Refill    b complex vitamins capsule Take 1 capsule by mouth once daily.      co-enzyme Q-10 30 mg capsule Take 30 mg by mouth 3 (three) times daily.      GREEN TEA EXTRACT ORAL Take by mouth.       No current facility-administered medications on file prior to visit.      Patient Active Problem List   Diagnosis    Malignant neoplasm of overlapping sites of left breast in female, estrogen receptor positive    Breast cancer    Decreased shoulder mobility, left    At risk for lymphedema    Palpitations     Past Surgical History:   Procedure Laterality Date    AXILLARY NODE DISSECTION Left 10/29/2018    Procedure: LYMPHADENECTOMY, AXILLARY;  Surgeon: Triston Avendaño MD;  Location: [facility] OR [address] [place] [address];  Service: General;  Laterality: Left;    BILATERAL MASTECTOMY Right 10/29/2018    Procedure: MASTECTOMY;  Surgeon: Triston Avendaño MD;  Location: [address];  Service: General;  Laterality: Right;    BIOPSY, LYMPH NODE, SENTINEL Left 10/29/2018    Performed by Triston Avendaño MD at [facility] OR [address] [place] [address]    BREAST BIOPSY Left 07/2018    BREAST RECONSTRUCTION Bilateral 10/29/2018    Procedure: RECONSTRUCTION, BREAST;  Surgeon: Jatin Smith MD;  Location: [address];  Service: Plastics;  Laterality: Bilateral;    INJECTION FOR SENTINEL NODE IDENTIFICATION Left 10/29/2018    Procedure: INJECTION, FOR SENTINEL NODE IDENTIFICATION;  Surgeon: Triston Avendaño MD;  Location: [address];  Service: General;  Laterality: Left;    INJECTION, FOR SENTINEL  NODE IDENTIFICATION Left 10/29/2018    Performed by Triston Avendaño MD at [facility] OR [address] [place] [address]    INSERTION OF BREAST IMPLANT Bilateral 10/29/2018    Procedure: INSERTION, BREAST IMPLANT;  Surgeon: Jatin Smith MD;  Location: [facility] OR [address] [place] [address];  Service: Plastics;  Laterality: Bilateral;    INSERTION OF TUNNELED CENTRAL VENOUS CATHETER (CVC) WITH SUBCUTANEOUS PORT Right 10/29/2018    Procedure: [id number] with flouro;  Surgeon: Triston Avendaño MD;  Location: [facility] OR [address] [place] [address];  Service: General;  Laterality: Right;    INSERTION, BREAST IMPLANT Bilateral 10/29/2018    Performed by Jatin Smith MD at [facility] OR [address] [place] [address]    [id number]-CATH with flouro Right 10/29/2018    Performed by Triston Avendaño MD at [facility] OR [address] [place] [address]    LYMPHADENECTOMY, AXILLARY Left 10/29/2018    Performed by Triston Avendaño MD at [facility] OR [address] [place] [address]    MASTECTOMY Right 10/29/2018    Performed by Triston Avendaño MD at [facility] OR [address] [place] [address]    MASTECTOMY, MODIFIED RADICAL Left 10/29/2018    Performed by Triston Avendaño MD at [facility] OR [address] [place] [address]    MODIFIED RADICAL MASTECTOMY W/ AXILLARY LYMPH NODE DISSECTION Left 10/29/2018    Procedure: MASTECTOMY, MODIFIED RADICAL;  Surgeon: Triston Avendaño MD;  Location: [address];  Service: General;  Laterality: Left;    RECONSTRUCTION, BREAST Bilateral 10/29/2018    Performed by Jatin Smith MD at [facility] OR [address] [place] [address]    SENTINEL LYMPH NODE BIOPSY Left 10/29/2018    Procedure: BIOPSY, LYMPH NODE, SENTINEL;  Surgeon: Triston Avendaño MD;  Location: [address];  Service: General;  Laterality: Left;    THYROID SURGERY       Social History     Socioeconomic History    Marital status: Single     Spouse name: Not on file    Number of children: Not on file    Years of education: Not on file    Highest education level: Not on file   Social Needs    Financial resource strain: Not on file    Food insecurity - worry: Not on file    Food insecurity -  inability: Not on file    Transportation needs - medical: Not on file    Transportation needs - non-medical: Not on file   Occupational History    Not on file   Tobacco Use    Smoking status: Never Smoker    Smokeless tobacco: Never Used   Substance and Sexual Activity    Alcohol use: No     Frequency: Never    Drug use: No    Sexual activity: No   Other Topics Concern    Not on file   Social History Narrative    Not on file       DATE OF PROCEDURE:  10/29/2018     PREOPERATIVE DIAGNOSIS:  Breast cancer.     POSTOPERATIVE DIAGNOSIS:  Breast cancer.     PROCEDURES PERFORMED:  1.  Immediate bilateral breast reconstruction using implants.  2.  Placement of AlloDerm, right breast.  3.  Placement of AlloDerm, left breast.     SURGEON:  Jatin Smith M.D., FACS     ANESTHESIA:  General.     BLOOD LOSS:  100 mL.     COMPLICATIONS:  None.    ROS - negative    PHYSICAL EXAMINATION  Vitals:    12/05/18 1406   BP: 134/81   Pulse: 67   Temp: 97.9 °F (36.6 °C)     WD WN NAD  VSS  Normal resp effort  R breast - incision CDI, no erythema/drainage, surgically absent nipple, small superficial wound - clean with no sign if infection, no exposed implant  L breast - incision CDI, no erythema/drainage, surgically absent nipple    ASSESSMENT/PLAN  63 y.o. F s/p B breast reconstruction  -  Doing well, no issues.   - Small open wound of R breast is healing well, clean and very superficial. Advised to c/w local wound care  - RTC x 2 weeks    All questions were answered. The patient was advised to call the clinic with any questions or concerns prior to their next visit.

## 2018-12-07 ENCOUNTER — HOSPITAL ENCOUNTER (OUTPATIENT)
Dept: CARDIOLOGY | Facility: CLINIC | Age: 63
Discharge: HOME | End: 2018-12-07
Payer: COMMERCIAL

## 2018-12-07 ENCOUNTER — OFFICE VISIT (OUTPATIENT)
Dept: CARDIOLOGY | Facility: CLINIC | Age: 63
End: 2018-12-07
Payer: COMMERCIAL

## 2018-12-07 ENCOUNTER — HOSPITAL ENCOUNTER (OUTPATIENT)
Dept: CARDIOLOGY | Facility: CLINIC | Age: 63
Discharge: HOME | End: 2018-12-07
Attending: INTERNAL MEDICINE
Payer: COMMERCIAL

## 2018-12-07 VITALS
SYSTOLIC BLOOD PRESSURE: 132 MMHG | HEART RATE: 69 BPM | DIASTOLIC BLOOD PRESSURE: 82 MMHG | WEIGHT: 228.63 LBS | BODY MASS INDEX: 36.74 KG/M2 | HEIGHT: 66 IN

## 2018-12-07 VITALS
HEIGHT: 66 IN | BODY MASS INDEX: 36.8 KG/M2 | SYSTOLIC BLOOD PRESSURE: 132 MMHG | HEART RATE: 72 BPM | WEIGHT: 229 LBS | DIASTOLIC BLOOD PRESSURE: 82 MMHG

## 2018-12-07 DIAGNOSIS — C50.812 MALIGNANT NEOPLASM OF OVERLAPPING SITES OF LEFT BREAST IN FEMALE, ESTROGEN RECEPTOR POSITIVE: ICD-10-CM

## 2018-12-07 DIAGNOSIS — Z17.0 MALIGNANT NEOPLASM OF OVERLAPPING SITES OF LEFT BREAST IN FEMALE, ESTROGEN RECEPTOR POSITIVE: ICD-10-CM

## 2018-12-07 DIAGNOSIS — Z17.0 MALIGNANT NEOPLASM OF OVERLAPPING SITES OF LEFT BREAST IN FEMALE, ESTROGEN RECEPTOR POSITIVE: Primary | ICD-10-CM

## 2018-12-07 DIAGNOSIS — C50.812 MALIGNANT NEOPLASM OF OVERLAPPING SITES OF LEFT BREAST IN FEMALE, ESTROGEN RECEPTOR POSITIVE: Primary | ICD-10-CM

## 2018-12-07 DIAGNOSIS — Z51.11 ENCOUNTER FOR CHEMOTHERAPY MANAGEMENT: ICD-10-CM

## 2018-12-07 LAB
ASCENDING AORTA: 2.9 CM
AV INDEX (PROSTH): 0.81
AV MEAN GRADIENT: 2.91 MMHG
AV PEAK GRADIENT: 6.55 MMHG
AV VALVE AREA: 2.34 CM2
BSA FOR ECHO PROCEDURE: 2.2 M2
CV ECHO LV RWT: 0.4 CM
DOP CALC AO PEAK VEL: 1.28 M/S
DOP CALC AO VTI: 26.32 CM
DOP CALC LVOT AREA: 2.89 CM2
DOP CALC LVOT DIAMETER: 1.92 CM
DOP CALC LVOT STROKE VOLUME: 61.55 CM3
DOP CALCLVOT PEAK VEL VTI: 21.27 CM
E WAVE DECELERATION TIME: 174.02 MSEC
E/A RATIO: 0.93
ECHO LV POSTERIOR WALL: 0.89 CM (ref 0.6–1.1)
FRACTIONAL SHORTENING: 29 % (ref 28–44)
INTERVENTRICULAR SEPTUM: 0.87 CM (ref 0.6–1.1)
IVRT: 0.07 MSEC
LA MAJOR: 4.82 CM
LA MINOR: 4.98 CM
LA WIDTH: 3.89 CM
LEFT ATRIUM SIZE: 3.17 CM
LEFT ATRIUM VOLUME INDEX: 24.2 ML/M2
LEFT ATRIUM VOLUME: 51.35 CM3
LEFT INTERNAL DIMENSION IN SYSTOLE: 3.21 CM (ref 2.1–4)
LEFT VENTRICLE DIASTOLIC VOLUME INDEX: 43.71 ML/M2
LEFT VENTRICLE DIASTOLIC VOLUME: 92.6 ML
LEFT VENTRICLE MASS INDEX: 60.8 G/M2
LEFT VENTRICLE SYSTOLIC VOLUME INDEX: 19.5 ML/M2
LEFT VENTRICLE SYSTOLIC VOLUME: 41.21 ML
LEFT VENTRICULAR INTERNAL DIMENSION IN DIASTOLE: 4.5 CM (ref 3.5–6)
LEFT VENTRICULAR MASS: 128.89 G
MV PEAK A VEL: 0.69 M/S
MV PEAK E VEL: 0.64 M/S
PISA TR MAX VEL: 2.48 M/S
PULM VEIN S/D RATIO: 1.48
PV PEAK D VEL: 0.4 M/S
PV PEAK S VEL: 0.59 M/S
RA MAJOR: 5.86 CM
RA PRESSURE: 3 MMHG
RA WIDTH: 4.26 CM
RIGHT VENTRICULAR END-DIASTOLIC DIMENSION: 4.12 CM
SINUS: 2.98 CM
STJ: 2.43 CM
TR MAX PG: 24.6 MMHG
TRICUSPID ANNULAR PLANE SYSTOLIC EXCURSION: 3.77 CM
TV REST PULMONARY ARTERY PRESSURE: 27.6 MMHG

## 2018-12-07 PROCEDURE — 99244 OFF/OP CNSLTJ NEW/EST MOD 40: CPT | Mod: S$GLB,,, | Performed by: INTERNAL MEDICINE

## 2018-12-07 PROCEDURE — 93005 ELECTROCARDIOGRAM TRACING: CPT | Mod: S$GLB,,, | Performed by: INTERNAL MEDICINE

## 2018-12-07 PROCEDURE — 93010 ELECTROCARDIOGRAM REPORT: CPT | Mod: S$GLB,,, | Performed by: INTERNAL MEDICINE

## 2018-12-07 PROCEDURE — 99999 PR PBB SHADOW E&M-EST. PATIENT-LVL III: CPT | Mod: PBBFAC,,, | Performed by: INTERNAL MEDICINE

## 2018-12-07 PROCEDURE — 93306 TTE W/DOPPLER COMPLETE: CPT | Mod: S$GLB,,, | Performed by: INTERNAL MEDICINE

## 2018-12-07 NOTE — PROGRESS NOTES
Subjective:   Patient ID:  Beatriz Faust is a 63 y.o. female who presents for evaluation of History of palpitations      HPI: She is referred by Dr. Delgado for cardiac evaluation prior to starting chemotherapy for newly diagnosed left breast cancer 9/18. At an OSH she underwent  core needle bx which came back as high grade invasive ductal carcinoma ER/DE/Her2 +.  She underwent bilateral mastectomies and is planning on starting adjuvant therapy with TCH-Perjeta. She states she was recently diagnosed with HTN which she was trying to treat with lifestyle changes. She reports that her readings have decreased since her surgery. She denies any other cardiac history. Beatriz Faust denies any chest pain, shortness of breath, PND, orthopnea, palpitations, leg edema, or syncope. She is scheduled for her baseline echo today.    ECG: Sinus bradycardia 49 bpm. QTc 388    Past Medical History:   Diagnosis Date    Ana's disease     Breast cancer 07/2018    left breast    Hypertension        Past Surgical History:   Procedure Laterality Date    AXILLARY NODE DISSECTION Left 10/29/2018    Procedure: LYMPHADENECTOMY, AXILLARY;  Surgeon: Triston Avendaño MD;  Location: [facility] OR [address] [place] [address];  Service: General;  Laterality: Left;    BILATERAL MASTECTOMY Right 10/29/2018    Procedure: MASTECTOMY;  Surgeon: Triston Avendaño MD;  Location: [facility] OR [address] [place] [address];  Service: General;  Laterality: Right;    BIOPSY, LYMPH NODE, SENTINEL Left 10/29/2018    Performed by Triston Avendaño MD at [facility] OR [address] [place] [address]    BREAST BIOPSY Left 07/2018    BREAST RECONSTRUCTION Bilateral 10/29/2018    Procedure: RECONSTRUCTION, BREAST;  Surgeon: Jatin Smith MD;  Location: [address];  Service: Plastics;  Laterality: Bilateral;    INJECTION FOR SENTINEL NODE IDENTIFICATION Left 10/29/2018    Procedure: INJECTION, FOR SENTINEL NODE IDENTIFICATION;  Surgeon: Triston Avendaño MD;  Location: [facility] OR [address] [place] [address];  Service: General;   Laterality: Left;    INJECTION, FOR SENTINEL NODE IDENTIFICATION Left 10/29/2018    Performed by Triston Avendaño MD at [facility] OR [address] [place] [address]    INSERTION OF BREAST IMPLANT Bilateral 10/29/2018    Procedure: INSERTION, BREAST IMPLANT;  Surgeon: Jatin Smith MD;  Location: [facility] OR [address] [place] [address];  Service: Plastics;  Laterality: Bilateral;    INSERTION OF TUNNELED CENTRAL VENOUS CATHETER (CVC) WITH SUBCUTANEOUS PORT Right 10/29/2018    Procedure: [id number] with flouro;  Surgeon: Triston Avendaño MD;  Location: [facility] OR [address] [place] [address];  Service: General;  Laterality: Right;    INSERTION, BREAST IMPLANT Bilateral 10/29/2018    Performed by Jatin Smith MD at [facility] OR [address] [place] [address]    [id number]-CATH with flouro Right 10/29/2018    Performed by Triston Avendaño MD at [facility] OR [address] [place] [address]    LYMPHADENECTOMY, AXILLARY Left 10/29/2018    Performed by Triston Avendaño MD at [facility] OR [address] [place] [address]    MASTECTOMY Right 10/29/2018    Performed by Triston Avendaño MD at [facility] OR [address] [place] [address]    MASTECTOMY, MODIFIED RADICAL Left 10/29/2018    Performed by Triston Avendaño MD at [facility] OR [address] [place] [address]    MODIFIED RADICAL MASTECTOMY W/ AXILLARY LYMPH NODE DISSECTION Left 10/29/2018    Procedure: MASTECTOMY, MODIFIED RADICAL;  Surgeon: Triston Avendaño MD;  Location: [address];  Service: General;  Laterality: Left;    RECONSTRUCTION, BREAST Bilateral 10/29/2018    Performed by Jatin Smith MD at [facility] OR [address] [place] [address]    SENTINEL LYMPH NODE BIOPSY Left 10/29/2018    Procedure: BIOPSY, LYMPH NODE, SENTINEL;  Surgeon: Triston Avendaño MD;  Location: [facility] OR [address] [place] [address];  Service: General;  Laterality: Left;    THYROID SURGERY      THYROIDECTOMY, PARTIAL         Social History     Socioeconomic History    Marital status: Single     Spouse name: None    Number of children: None    Years of education: None    Highest education level: None   Social Needs    Financial resource strain: None    Food insecurity -  worry: None    Food insecurity - inability: None    Transportation needs - medical: None    Transportation needs - non-medical: None   Occupational History    None   Tobacco Use    Smoking status: Never Smoker    Smokeless tobacco: Never Used   Substance and Sexual Activity    Alcohol use: No     Frequency: Never    Drug use: No    Sexual activity: No   Other Topics Concern    None   Social History Narrative    None       Family History   Problem Relation Age of Onset    Diabetes Mother     Asbestos Father     Cancer Father     Heart attack Father     Seizures Sister     Diabetes Brother     Heart failure Maternal Aunt     Colon cancer Maternal Uncle     Lung cancer Paternal Aunt     Lung cancer Paternal Uncle     Stroke Maternal Grandmother     Heart failure Maternal Grandmother     No Known Problems Maternal Grandfather     No Known Problems Paternal Grandmother     No Known Problems Paternal Grandfather     No Known Problems Sister     No Known Problems Sister           Medication List           Accurate as of 12/7/18 11:26 AM. If you have any questions, ask your nurse or doctor.               CONTINUE taking these medications    b complex vitamins capsule     co-enzyme Q-10 30 mg capsule     GREEN TEA EXTRACT ORAL            Review of Systems   Constitution: Negative for weakness, malaise/fatigue and weight gain.   HENT: Negative for hearing loss.    Eyes: Negative for visual disturbance.   Cardiovascular: Negative for chest pain, claudication, dyspnea on exertion, leg swelling, near-syncope, orthopnea, palpitations, paroxysmal nocturnal dyspnea and syncope.   Respiratory: Negative for cough, shortness of breath, sleep disturbances due to breathing, snoring and wheezing.    Endocrine: Negative for cold intolerance, heat intolerance, polydipsia, polyphagia and polyuria.   Hematologic/Lymphatic: Negative for bleeding problem. Does not bruise/bleed easily.   Skin: Negative for rash and  "suspicious lesions.   Musculoskeletal: Negative for arthritis, falls, joint pain, muscle weakness and myalgias.   Gastrointestinal: Negative for abdominal pain, change in bowel habit, constipation, diarrhea, heartburn, hematochezia, melena and nausea.   Genitourinary: Negative for hematuria and nocturia.   Neurological: Negative for excessive daytime sleepiness, dizziness, headaches, light-headedness and loss of balance.   Psychiatric/Behavioral: Negative for depression. The patient is not nervous/anxious.    Allergic/Immunologic: Negative for environmental allergies.       /82 (BP Location: Right arm, Patient Position: Sitting, BP Method: Large (Automatic))   Pulse 69   Ht 5' 6.3" (1.684 m)   Wt 103.7 kg (228 lb 9.9 oz)   BMI 36.57 kg/m²     Objective:   Physical Exam   Constitutional: She is oriented to person, place, and time. She appears well-developed and well-nourished.        HENT:   Head: Normocephalic and atraumatic.   Mouth/Throat: Oropharynx is clear and moist.   Eyes: Conjunctivae and EOM are normal. Pupils are equal, round, and reactive to light. No scleral icterus.   Neck: Normal range of motion. Neck supple. No hepatojugular reflux and no JVD present. No tracheal deviation present. Thyromegaly present.   Left thyroid enlarged   Cardiovascular: Normal rate, regular rhythm, normal heart sounds and intact distal pulses. PMI is not displaced.   Pulses:       Carotid pulses are 2+ on the right side, and 2+ on the left side.       Radial pulses are 2+ on the right side, and 2+ on the left side.        Dorsalis pedis pulses are 2+ on the right side, and 2+ on the left side.        Posterior tibial pulses are 2+ on the right side, and 2+ on the left side.   Pulmonary/Chest: Effort normal and breath sounds normal.   Abdominal: Soft. Bowel sounds are normal. She exhibits no distension and no mass. There is no hepatosplenomegaly. There is no tenderness.   Musculoskeletal: She exhibits no edema or " tenderness.   Lymphadenopathy:     She has no cervical adenopathy.   Neurological: She is alert and oriented to person, place, and time.   Skin: Skin is warm and dry. No rash noted. No cyanosis or erythema. Nails show no clubbing.   Psychiatric: She has a normal mood and affect. Her speech is normal and behavior is normal.       Lab Results   Component Value Date     10/24/2018    K 4.4 10/24/2018     10/24/2018    CO2 27 10/24/2018    BUN 17 10/24/2018    CREATININE 0.8 10/24/2018    GLU 89 10/24/2018    AST 27 02/23/2007    ALT 37 (H) 02/23/2007    ALBUMIN 4.5 02/23/2007    PROT 7.5 02/23/2007    BILITOT 0.4 02/23/2007    WBC 6.09 10/24/2018    HGB 13.6 10/24/2018    HCT 42.7 10/24/2018    MCV 87 10/24/2018     10/24/2018    TSH 5.8 (H) 02/23/2007    CHOL 205 (H) 02/23/2007    HDL 38.0 (L) 02/23/2007    LDLCALC 136.8 (H) 02/23/2007    TRIG 151 (H) 02/23/2007       Assessment:     1. Malignant neoplasm of overlapping sites of left breast in female, estrogen receptor positive : Her baseline cardiac risk is low. She is scheduled for an echo today. I have ordered an FLP and baseline troponin. Serial echocardiograms are recommended every three months while receiving Herceptin, and 6 months following completion. Monthly troponin levels are recommended for screening for cardiotoxicity.         Plan:     Beatriz was seen today for history of palpitations.    Diagnoses and all orders for this visit:    Malignant neoplasm of overlapping sites of left breast in female, estrogen receptor positive  -     Lipid panel; Future  -     Troponin I; Future  -     EKG 12-lead; Future        Thank you for allowing me to participate in this patient's care. Please do not hesitate to contact me with any questions or concerns.

## 2018-12-10 ENCOUNTER — OFFICE VISIT (OUTPATIENT)
Dept: HEMATOLOGY/ONCOLOGY | Facility: CLINIC | Age: 63
End: 2018-12-10
Payer: COMMERCIAL

## 2018-12-10 ENCOUNTER — LAB VISIT (OUTPATIENT)
Dept: LAB | Facility: HOSPITAL | Age: 63
End: 2018-12-10
Attending: INTERNAL MEDICINE
Payer: COMMERCIAL

## 2018-12-10 VITALS
HEART RATE: 76 BPM | BODY MASS INDEX: 36.72 KG/M2 | WEIGHT: 227.5 LBS | SYSTOLIC BLOOD PRESSURE: 161 MMHG | TEMPERATURE: 98 F | DIASTOLIC BLOOD PRESSURE: 84 MMHG | RESPIRATION RATE: 20 BRPM

## 2018-12-10 DIAGNOSIS — C50.812 MALIGNANT NEOPLASM OF OVERLAPPING SITES OF LEFT BREAST IN FEMALE, ESTROGEN RECEPTOR POSITIVE: ICD-10-CM

## 2018-12-10 DIAGNOSIS — Z85.3 HISTORY OF BREAST CANCER: ICD-10-CM

## 2018-12-10 DIAGNOSIS — Z17.0 MALIGNANT NEOPLASM OF OVERLAPPING SITES OF LEFT BREAST IN FEMALE, ESTROGEN RECEPTOR POSITIVE: ICD-10-CM

## 2018-12-10 DIAGNOSIS — R11.0 NAUSEA IN ADULT: Primary | ICD-10-CM

## 2018-12-10 LAB
25(OH)D3+25(OH)D2 SERPL-MCNC: 30 NG/ML
ALBUMIN SERPL BCP-MCNC: 3.7 G/DL
ALP SERPL-CCNC: 78 U/L
ALT SERPL W/O P-5'-P-CCNC: 17 U/L
ANION GAP SERPL CALC-SCNC: 10 MMOL/L
AST SERPL-CCNC: 17 U/L
BASOPHILS # BLD AUTO: 0.06 K/UL
BASOPHILS NFR BLD: 1 %
BILIRUB SERPL-MCNC: 0.5 MG/DL
BUN SERPL-MCNC: 11 MG/DL
CALCIUM SERPL-MCNC: 9.8 MG/DL
CHLORIDE SERPL-SCNC: 104 MMOL/L
CHOLEST SERPL-MCNC: 208 MG/DL
CHOLEST/HDLC SERPL: 5.5 {RATIO}
CO2 SERPL-SCNC: 27 MMOL/L
CREAT SERPL-MCNC: 0.7 MG/DL
DIFFERENTIAL METHOD: ABNORMAL
EOSINOPHIL # BLD AUTO: 0.1 K/UL
EOSINOPHIL NFR BLD: 1.8 %
ERYTHROCYTE [DISTWIDTH] IN BLOOD BY AUTOMATED COUNT: 13.7 %
EST. GFR  (AFRICAN AMERICAN): >60 ML/MIN/1.73 M^2
EST. GFR  (NON AFRICAN AMERICAN): >60 ML/MIN/1.73 M^2
GLUCOSE SERPL-MCNC: 98 MG/DL
HCT VFR BLD AUTO: 40.8 %
HDLC SERPL-MCNC: 38 MG/DL
HDLC SERPL: 18.3 %
HGB BLD-MCNC: 12.7 G/DL
IMM GRANULOCYTES # BLD AUTO: 0.01 K/UL
IMM GRANULOCYTES NFR BLD AUTO: 0.2 %
LDLC SERPL CALC-MCNC: 139.2 MG/DL
LYMPHOCYTES # BLD AUTO: 1.4 K/UL
LYMPHOCYTES NFR BLD: 22.3 %
MCH RBC QN AUTO: 26.7 PG
MCHC RBC AUTO-ENTMCNC: 31.1 G/DL
MCV RBC AUTO: 86 FL
MONOCYTES # BLD AUTO: 0.3 K/UL
MONOCYTES NFR BLD: 4.8 %
NEUTROPHILS # BLD AUTO: 4.2 K/UL
NEUTROPHILS NFR BLD: 69.9 %
NONHDLC SERPL-MCNC: 170 MG/DL
NRBC BLD-RTO: 0 /100 WBC
PLATELET # BLD AUTO: 259 K/UL
PMV BLD AUTO: 11 FL
POTASSIUM SERPL-SCNC: 4 MMOL/L
PROT SERPL-MCNC: 7.5 G/DL
RBC # BLD AUTO: 4.76 M/UL
SODIUM SERPL-SCNC: 141 MMOL/L
TRIGL SERPL-MCNC: 154 MG/DL
TROPONIN I SERPL DL<=0.01 NG/ML-MCNC: <0.006 NG/ML
WBC # BLD AUTO: 6.06 K/UL

## 2018-12-10 PROCEDURE — 82306 VITAMIN D 25 HYDROXY: CPT

## 2018-12-10 PROCEDURE — 80061 LIPID PANEL: CPT

## 2018-12-10 PROCEDURE — 85025 COMPLETE CBC W/AUTO DIFF WBC: CPT

## 2018-12-10 PROCEDURE — 99999 PR PBB SHADOW E&M-EST. PATIENT-LVL III: CPT | Mod: PBBFAC,,, | Performed by: INTERNAL MEDICINE

## 2018-12-10 PROCEDURE — 84484 ASSAY OF TROPONIN QUANT: CPT

## 2018-12-10 PROCEDURE — 80053 COMPREHEN METABOLIC PANEL: CPT

## 2018-12-10 PROCEDURE — 3008F BODY MASS INDEX DOCD: CPT | Mod: S$GLB,,, | Performed by: INTERNAL MEDICINE

## 2018-12-10 PROCEDURE — 36415 COLL VENOUS BLD VENIPUNCTURE: CPT

## 2018-12-10 PROCEDURE — 99214 OFFICE O/P EST MOD 30 MIN: CPT | Mod: S$GLB,,, | Performed by: INTERNAL MEDICINE

## 2018-12-10 RX ORDER — ONDANSETRON HYDROCHLORIDE 8 MG/1
8 TABLET, FILM COATED ORAL EVERY 12 HOURS PRN
Qty: 30 TABLET | Refills: 2 | Status: SHIPPED | OUTPATIENT
Start: 2018-12-10 | End: 2019-12-10

## 2018-12-10 RX ORDER — PROMETHAZINE HYDROCHLORIDE 25 MG/1
25 TABLET ORAL EVERY 6 HOURS PRN
Qty: 30 TABLET | Refills: 1 | Status: SHIPPED | OUTPATIENT
Start: 2018-12-10 | End: 2020-10-30

## 2018-12-10 NOTE — Clinical Note
RTC 12/27 or 12/28 for chemoMove from tomorrowPerjeta needs to be auth'dRTC 3 weeks later with cbc, cmp and chemo

## 2018-12-10 NOTE — PROGRESS NOTES
Presents for consent  Patient and her sister have several questions that were addressed  ECHO and cardiology visit have been reviewed    Chemo drugs reviewed and consent signed    30 minutes total  Distress Screening Results: Psychosocial Distress screening score of Distress Score: 0 noted and reviewed. No intervention indicated.

## 2018-12-11 ENCOUNTER — PATIENT MESSAGE (OUTPATIENT)
Dept: CARDIOLOGY | Facility: CLINIC | Age: 63
End: 2018-12-11

## 2018-12-11 ENCOUNTER — CLINICAL SUPPORT (OUTPATIENT)
Dept: REHABILITATION | Facility: HOSPITAL | Age: 63
End: 2018-12-11
Payer: COMMERCIAL

## 2018-12-11 DIAGNOSIS — C50.812 MALIGNANT NEOPLASM OF OVERLAPPING SITES OF LEFT BREAST IN FEMALE, ESTROGEN RECEPTOR POSITIVE: ICD-10-CM

## 2018-12-11 DIAGNOSIS — M25.612 DECREASED SHOULDER MOBILITY, LEFT: ICD-10-CM

## 2018-12-11 DIAGNOSIS — Z17.0 MALIGNANT NEOPLASM OF OVERLAPPING SITES OF LEFT BREAST IN FEMALE, ESTROGEN RECEPTOR POSITIVE: ICD-10-CM

## 2018-12-11 DIAGNOSIS — Z91.89 AT RISK FOR LYMPHEDEMA: ICD-10-CM

## 2018-12-11 PROCEDURE — 97140 MANUAL THERAPY 1/> REGIONS: CPT | Mod: PO | Performed by: PHYSICAL MEDICINE & REHABILITATION

## 2018-12-11 PROCEDURE — 97110 THERAPEUTIC EXERCISES: CPT | Mod: PO | Performed by: PHYSICAL MEDICINE & REHABILITATION

## 2018-12-11 NOTE — PROGRESS NOTES
"                                                    Physical Therapy Daily Treatment Note     Name: Beatriz Faust  Clinic Number: [id number]  Diagnosis:   Encounter Diagnoses   Name Primary?    Decreased shoulder mobility, left     At risk for lymphedema     Malignant neoplasm of overlapping sites of left breast in female, estrogen receptor positive      Physician: Triston Avendaño MD    Precautions: Standard, cancer  Visit #: 3 of 20  Time In: 9:15 AM  Time Out: 10:10 AM  Total Treatment Time 1:1:   55 minutes    Evaluation Date: 11/29/18  Visit # authorized: 20  Authorization period: 12/31/18  Plan of care Expiration: 1/24/18  MD referral: Triston Avendaño  Insurance: [facility]       Subjective     Pt reports: right arm motion much better and able to reach higher with RUE. Patient states gets swelling lateral aspect of her breasts that goes away at night and tends to increase as day goes on.  Pain Scale: Beatriz rates pain on a scale of 0/10 on VAS.   Pain location: N/A    Fatigue: none reported  Functional change: raising both UE's without difficulty  Treatment: Chemotherapy begins 12/27/18. No radiation.  Surgery date: 10/29/18 bilateral mastectomies with SLNB and ALND and pre-pectoralis implant reconstruction and insertion of Port- A - Cath      Objective     Beatriz received individual therapeutic exercises to improve postural correction and alignment, stretching and soft tissue mobility, and strengthening for 30 minutes including the following:     LTR c/ hands behind head                        1 x 10  Standing shoulder extension with wand   1 x 10  Supine Shld Flexion with wand                 1 x 10  Supine Shld ER with wand                       1 x 10  Sidelying Shld Abd (B)                              1 x 10  Scap Retractions                                      2 x 10  BTB  Wall Slides Flexion (B)       10 x 5"   Wall slides Abd (B)        10 x 5"  Pec stretch in doorway                   10 " " x 5"    Beatriz received the following manual therapy techniques were performed to increased myofascial/soft tissue length, mobility and pliability, increase PROM, AROM and function as well as to decrease pain for 25 minutes    Stretching, bending, twisting for left axillary and upper arm cording- L axilla  Lateral chest wall Stretch (B)  Grade I-II G-H joint mobs (B)     Posterior Capsule Stretch  (B)  Passive Stretch all shoulder Motions (B)    12/11/18  Active /Passive ROM Right Left   Flexion 175 175   Abduction 175 170   Extension 70 70   IR/90deg 90 90   ER/90deg 85 85        CMS Impairment/Limitation/Restriction for FOTO Outcome Survey     Therapist reviewed FOTO scores for Beatriz Faust on 12/5/2018.   FOTO documents entered into [employer] - see Media section.     Limitations Score: 18%  Category: Carrying      Home Exercise Program and Patient Education   Education provided re:  - role of PT in multi - disciplinary team, goals for PT  - progress towards goals   -bolded exercises added to HEP    See EMR under notes/patient instructions for HEP given/taught this session - all sets and reps included. Pt received printed copy.     Pt was able to demonstrate and report understanding and performance  Pt has no cultural, educational or language barriers to learning provided.    Assessment     Patient is responding very well to physical therapy.  Is at expected function and pain level for 6 weeks post operatively. Pt received manual technique as above and L axillary cording has decreased. Patient performed above exercise program. Improvements noted wit AROM bilateral shoulders. Patient instructed to continue with HEP.  Pt has met goals as noted below. Will continue to progress all interventions as tolerated. Patient has met all STG.  Pain after treatment: 0/10    Pt prognosis is Excellent. Pt will continue to benefit from skilled outpatient physical therapy to address the deficits listed in the problem list chart on " initial evaluation, provide pt/family education and to maximize pt's level of independence in the home and community environment.       Goals: Pt agrees with goals set     Short Term goals: 4 weeks  1. Patient will demonstrate 100% understanding of lymphedema risk reduction practices to include self monitoring for lymphedema. (met, 12/11/18)  2. Patient will demonstrate independence with Home Exercise program established. (met, 12/11/18)  3. Pt will increase AROM/PROM in shoulder abduction ROM to 150 degrees bilaterally to improve functional reach, carry, push, pull pain free. (Met 12/5)  4. Pt will increase AROM/PROM in shoulder flexion to 160 degrees bilaterally to improve functional reach, carry, push, pull pain free.(met, 12/11/18)  5. Pt will increase AROM/PROM in shoulder ER to 80 degrees bilaterally in order to perform functional activites (met, 12/11/18)     Long Term Goals: 8 weeks   1.  Pt will increase AROM/PROM in shoulder flexion to 180 degrees bilaterally to improve functional reach, carry, push, pull pain free. (progressing, not met)  2. Pt will increase AROM/PROM in shoulder ER to 90 degrees bilaterally in order to perform  functional activities pain free. (progressing, not met)  3. Pt will demonstrate full/maximized tissue mobility to increase ROM and promote healthy tissue to be pain free at discharge. (progressing, not met)  4. Pt will report decrease in overall worst pain to 2/10 at discharge. (progressing, not met)  5. Pt will increase AROM/PROM in shoulder abduction ROM to 180 degrees bilaterally to improve functional reach, carry, push, pull pain free. (progressing, not met)  6. Patient will report compliance with walking program 5x week for 10 min each day to improve overall cardiovascular function and decrease cancer related fatigue at discharge. (progressing, not met)             Plan     Continue established POC toward physical therapy goals. Possible discharge next session.    Therapist:  Catrachita Ocasio, PT  12/11/2018

## 2018-12-12 ENCOUNTER — PATIENT MESSAGE (OUTPATIENT)
Dept: ADMINISTRATIVE | Facility: OTHER | Age: 63
End: 2018-12-12

## 2018-12-18 ENCOUNTER — CLINICAL SUPPORT (OUTPATIENT)
Dept: REHABILITATION | Facility: HOSPITAL | Age: 63
End: 2018-12-18
Payer: COMMERCIAL

## 2018-12-18 DIAGNOSIS — Z91.89 AT RISK FOR LYMPHEDEMA: ICD-10-CM

## 2018-12-18 DIAGNOSIS — C50.812 MALIGNANT NEOPLASM OF OVERLAPPING SITES OF LEFT BREAST IN FEMALE, ESTROGEN RECEPTOR POSITIVE: ICD-10-CM

## 2018-12-18 DIAGNOSIS — Z17.0 MALIGNANT NEOPLASM OF OVERLAPPING SITES OF LEFT BREAST IN FEMALE, ESTROGEN RECEPTOR POSITIVE: ICD-10-CM

## 2018-12-18 DIAGNOSIS — M25.612 DECREASED SHOULDER MOBILITY, LEFT: ICD-10-CM

## 2018-12-18 PROCEDURE — 97140 MANUAL THERAPY 1/> REGIONS: CPT | Mod: PO | Performed by: PHYSICAL MEDICINE & REHABILITATION

## 2018-12-18 PROCEDURE — 97110 THERAPEUTIC EXERCISES: CPT | Mod: PO | Performed by: PHYSICAL MEDICINE & REHABILITATION

## 2018-12-18 NOTE — PROGRESS NOTES
Physical Therapy Discharge Note     Name: Beatriz Faust  Clinic Number: [id number]  Diagnosis:   Encounter Diagnoses   Name Primary?    Decreased shoulder mobility, left     At risk for lymphedema     Malignant neoplasm of overlapping sites of left breast in female, estrogen receptor positive      Physician: Triston Avendaño MD    Precautions: Standard, cancer  Visit #: 4 of 20  Time In: 11:05  AM  Time Out:  11:50 AM  Total Treatment Time 1:1:   45 minutes    Evaluation Date: 11/29/18  Visit # authorized: 20  Authorization period: 12/31/18  Plan of care Expiration: 1/24/18  MD referral: Triston Avendaño  Insurance: [facility]       Subjective     Pt reports: right arm feels good and able to move her arms without difficulty.  Patient states gets swelling lateral aspect of her breasts that goes away at night and tends to increase as day goes on. Patient states she will be starting chemotherapy 12/27/18. Patient states she is ready for discharge and can continue to on HEP.  Pain Scale: Olive rates pain on a scale of 0/10 on VAS.   Pain location: N/A    Fatigue: none reported  Functional change: raising both UE's without difficulty  Treatment: Chemotherapy begins 12/27/18. No radiation.  Surgery date: 10/29/18 bilateral mastectomies with SLNB and ALND and pre-pectoralis implant reconstruction and insertion of Port- A - Cath      Objective     Beatriz received individual therapeutic exercises to improve postural correction and alignment, stretching and soft tissue mobility, and strengthening for 30 minutes including the following:     LTR c/ hands behind head                        1 x 10  Standing shoulder extension with wand   1 x 10  Supine Shld Flexion with wand                 1 x 10  Supine Shld ER with wand                       1 x 10  Sidelying Shld Abd (B)                              1 x 10  Scap Retractions                                      2  "x 10  BTB  Wall Slides Flexion (B)       10 x 5"   Wall slides Abd (B)        10 x 5"  Pec stretch in doorway                   10  x 5"    Beatriz received the following manual therapy techniques were performed to increased myofascial/soft tissue length, mobility and pliability, increase PROM, AROM and function as well as to decrease pain for 15 minutes  Grade I-II G-H joint mobs (B)     Posterior Capsule Stretch  (B)  Passive Stretch all shoulder Motions (B)    12/18/18  Active /Passive ROM Right Left   Flexion 180 180   Abduction 180 180   Extension 70 70   IR/90deg 90 90   ER/90deg 90 90        Strength: manual muscle test grades below   Upper Extremity Strength: 12/18/18    (R) UE (L) UE   Shoulder flexion: 5/5 5/5   Shoulder Abduction: 5/5 5/5   Shoulder IR 5/5 5/5   Shoulder ER 5/5 5/5   Elbow flexion: 5/5 5/5   Elbow extension: 5/5 5/5   Wrist flexion: 5/5 5/5   Wrist extension: 5/5 5/5    5/5 5/5            CMS Impairment/Limitation/Restriction for FOTO Outcome Survey     Therapist reviewed FOTO scores for Beatriz Faust on 12/18/2018.   FOTO documents entered into [employer] - see Media section.     Limitations Score: 5 %  Category: Carrying      Home Exercise Program and Patient Education   Education provided re:  - role of PT in multi - disciplinary team, goals for PT  - progress towards goals   -bolded exercises added to HEP    See EMR under notes/patient instructions for HEP given/taught this session - all sets and reps included. Pt received printed copy.     Pt was able to demonstrate and report understanding and performance  Pt has no cultural, educational or language barriers to learning provided.    Assessment     Patient has responded very well to physical therapy.  Is at expected function and pain level for 7 weeks post operatively.   Reassessment for discharge. AROM WNL bilateral shoulders and strength in 5/5 range BUE. Patient no longer exhibits cording in left axillla or upper arm and no longer " exhibits left shoulder joint tightness. Reviewed above exercise program for patient to continue on HEP and patient is independent with HEP.     Pain after treatment: 0/10    Pt prognosis is Excellent.  Patient has met all STG and LTG.    Goals: Pt agrees with goals set     Short Term goals: 4 weeks  1. Patient will demonstrate 100% understanding of lymphedema risk reduction practices to include self monitoring for lymphedema. (met, 12/11/18)  2. Patient will demonstrate independence with Home Exercise program established. (met, 12/11/18)  3. Pt will increase AROM/PROM in shoulder abduction ROM to 150 degrees bilaterally to improve functional reach, carry, push, pull pain free. (Met 12/5)  4. Pt will increase AROM/PROM in shoulder flexion to 160 degrees bilaterally to improve functional reach, carry, push, pull pain free.(met, 12/11/18)  5. Pt will increase AROM/PROM in shoulder ER to 80 degrees bilaterally in order to perform functional activites (met, 12/11/18)     Long Term Goals: 8 weeks   1.  Pt will increase AROM/PROM in shoulder flexion to 180 degrees bilaterally to improve functional reach, carry, push, pull pain free. (met, 12/18/18)  2. Pt will increase AROM/PROM in shoulder ER to 90 degrees bilaterally in order to perform  functional activities pain free. (met, 12/18/18)  3. Pt will demonstrate full/maximized tissue mobility to increase ROM and promote healthy tissue to be pain free at discharge. (met, 12/18/18)  4. Pt will report decrease in overall worst pain to 2/10 at discharge. (met, 12/18/18)  5. Pt will increase AROM/PROM in shoulder abduction ROM to 180 degrees bilaterally to improve functional reach, carry, push, pull pain free. (met, 12/18/18)  6. Patient will report compliance with walking program 5x week for 10 min each day to improve overall cardiovascular function and decrease cancer related fatigue at discharge. (met, 12/18/18)             Plan     Patient is discharged from physical  therapy.    Therapist: Catrachita Ocasio, PT  12/18/2018

## 2018-12-19 ENCOUNTER — OFFICE VISIT (OUTPATIENT)
Dept: PLASTIC SURGERY | Facility: CLINIC | Age: 63
End: 2018-12-19
Payer: COMMERCIAL

## 2018-12-19 VITALS
DIASTOLIC BLOOD PRESSURE: 87 MMHG | WEIGHT: 229.94 LBS | SYSTOLIC BLOOD PRESSURE: 162 MMHG | HEART RATE: 56 BPM | HEIGHT: 66 IN | BODY MASS INDEX: 36.95 KG/M2

## 2018-12-19 DIAGNOSIS — Z09 SURGERY FOLLOW-UP EXAMINATION: Primary | ICD-10-CM

## 2018-12-19 PROCEDURE — 99999 PR PBB SHADOW E&M-EST. PATIENT-LVL III: CPT | Mod: PBBFAC,,, | Performed by: SURGERY

## 2018-12-19 PROCEDURE — 99024 POSTOP FOLLOW-UP VISIT: CPT | Mod: S$GLB,,, | Performed by: SURGERY

## 2018-12-19 NOTE — PROGRESS NOTES
History & Physical  Plastic Surgery Clinic    SUBJECTIVE:       History of Present Illness:  Patient is a 63 y.o. female presents for follow up of bilateral double implant reconstruction.  Previously, she had epidermolysis under the right breast.  It is healing well.  She only complains of bilateral swelling in her axilla. Will be starting chemotherapy soon.    Past Medical History:  Past Medical History:   Diagnosis Date    Ana's disease     Breast cancer 07/2018    left breast    Hypertension        Past Surgical History:  Past Surgical History:   Procedure Laterality Date    AXILLARY NODE DISSECTION Left 10/29/2018    Procedure: LYMPHADENECTOMY, AXILLARY;  Surgeon: Triston Avendaño MD;  Location: [address];  Service: General;  Laterality: Left;    BILATERAL MASTECTOMY Right 10/29/2018    Procedure: MASTECTOMY;  Surgeon: Triston Avendaño MD;  Location: [address];  Service: General;  Laterality: Right;    BIOPSY, LYMPH NODE, SENTINEL Left 10/29/2018    Performed by Triston Avendaño MD at [facility] OR [address] [place] [address]    BREAST BIOPSY Left 07/2018    BREAST RECONSTRUCTION Bilateral 10/29/2018    Procedure: RECONSTRUCTION, BREAST;  Surgeon: Jatin Smith MD;  Location: [address];  Service: Plastics;  Laterality: Bilateral;    INJECTION FOR SENTINEL NODE IDENTIFICATION Left 10/29/2018    Procedure: INJECTION, FOR SENTINEL NODE IDENTIFICATION;  Surgeon: Triston Avendaño MD;  Location: [address];  Service: General;  Laterality: Left;    INJECTION, FOR SENTINEL NODE IDENTIFICATION Left 10/29/2018    Performed by Triston Avendaño MD at [facility] OR [address] [place] [address]    INSERTION OF BREAST IMPLANT Bilateral 10/29/2018    Procedure: INSERTION, BREAST IMPLANT;  Surgeon: Jatin Smith MD;  Location: [address];  Service: Plastics;  Laterality: Bilateral;    INSERTION OF TUNNELED CENTRAL VENOUS CATHETER (CVC) WITH SUBCUTANEOUS PORT Right 10/29/2018    Procedure:  [id number] with flouro;  Surgeon: Triston Avendaño MD;  Location: [facility] OR [address] [place] [address];  Service: General;  Laterality: Right;    INSERTION, BREAST IMPLANT Bilateral 10/29/2018    Performed by Jatin Smith MD at [facility] OR 2ND FLR    [id number]-CATH with flouro Right 10/29/2018    Performed by Triston Avendaño MD at [facility] OR [address] [place] [address]    LYMPHADENECTOMY, AXILLARY Left 10/29/2018    [facility]    MASTECTOMY Right 10/29/2018    [facility]    MASTECTOMY, MODIFIED RADICAL Left 10/29/2018    Performed by Triston Avendaño MD at [facility] OR [address] [place] [address]    MODIFIED RADICAL MASTECTOMY W/ AXILLARY LYMPH NODE DISSECTION Left 10/29/2018    Procedure: MASTECTOMY, MODIFIED RADICAL;  Surgeon: Triston Avendaño MD;  Location: [facility] OR [address] [place] [address];  Service: General;  Laterality: Left;    RECONSTRUCTION, BREAST Bilateral 10/29/2018    [facility]    SENTINEL LYMPH NODE BIOPSY Left 10/29/2018    Procedure: BIOPSY, LYMPH NODE, SENTINEL;  Surgeon: Triston Avendaño MD;  Location: [facility] OR [address] [place] [address];  Service: General;  Laterality: Left;    THYROID SURGERY      THYROIDECTOMY, PARTIAL         Family History:  Family History   Problem Relation Age of Onset    Diabetes Mother     Asbestos Father     Cancer Father     Heart attack Father     Seizures Sister     Diabetes Brother     Heart failure Maternal Aunt     Colon cancer Maternal Uncle     Lung cancer Paternal Aunt     Lung cancer Paternal Uncle     Stroke Maternal Grandmother     Heart failure Maternal Grandmother     No Known Problems Maternal Grandfather     No Known Problems Paternal Grandmother     No Known Problems Paternal Grandfather     No Known Problems Sister     No Known Problems Sister        Social History:  Social History     Socioeconomic History    Marital status: Single     Spouse name: None     "Number of children: None    Years of education: None    Highest education level: None   Social Needs    Financial resource strain: None    Food insecurity - worry: None    Food insecurity - inability: None    Transportation needs - medical: None    Transportation needs - non-medical: None   Occupational History    None   Tobacco Use    Smoking status: Never Smoker    Smokeless tobacco: Never Used   Substance and Sexual Activity    Alcohol use: No     Frequency: Never    Drug use: No    Sexual activity: No   Other Topics Concern    None   Social History Narrative    None       Medications:  Current Outpatient Medications   Medication Sig Dispense Refill    b complex vitamins capsule Take 1 capsule by mouth once daily.      co-enzyme Q-10 30 mg capsule Take 30 mg by mouth 3 (three) times daily.      GREEN TEA EXTRACT ORAL Take by mouth.      ondansetron (ZOFRAN) 8 MG tablet Take 1 tablet (8 mg total) by mouth every 12 (twelve) hours as needed for Nausea. 30 tablet 2    promethazine (PHENERGAN) 25 MG tablet Take 1 tablet (25 mg total) by mouth every 6 (six) hours as needed for Nausea. 30 tablet 1     No current facility-administered medications for this visit.        Allergies:  Review of patient's allergies indicates:  No Known Allergies    Review of Systems:  Negative except for HPI.      OBJECTIVE:     BP (!) 162/87   Pulse (!) 56   Ht 5' 6" (1.676 m)   Wt 104.3 kg (229 lb 15 oz)   BMI 37.11 kg/m²     Physical Exam:  Constitutional: Oriented to person, place, and time. Appears well-developed and well-nourished.   General:   Cardiac:   Respiratory: Normal respiratory effort  GI: Abdomen soft and nontender  Breast:        ASSESSMENT/PLAN:     63 y.o. female s/p bilateral reconstruction with direct implants.  - F/U in January after two weeks after she starts chemotherapy.      "

## 2018-12-27 ENCOUNTER — LAB VISIT (OUTPATIENT)
Dept: LAB | Facility: HOSPITAL | Age: 63
End: 2018-12-27
Attending: INTERNAL MEDICINE
Payer: COMMERCIAL

## 2018-12-27 ENCOUNTER — INFUSION (OUTPATIENT)
Dept: INFUSION THERAPY | Facility: HOSPITAL | Age: 63
End: 2018-12-27
Attending: INTERNAL MEDICINE
Payer: COMMERCIAL

## 2018-12-27 VITALS
BODY MASS INDEX: 36.92 KG/M2 | HEART RATE: 70 BPM | WEIGHT: 229.75 LBS | DIASTOLIC BLOOD PRESSURE: 72 MMHG | HEIGHT: 66 IN | OXYGEN SATURATION: 100 % | SYSTOLIC BLOOD PRESSURE: 148 MMHG | RESPIRATION RATE: 18 BRPM | TEMPERATURE: 98 F

## 2018-12-27 DIAGNOSIS — C50.519 MALIGNANT NEOPLASM OF LOWER-OUTER QUADRANT OF FEMALE BREAST, ESTROGEN RECEPTOR NEGATIVE, UNSPECIFIED LATERALITY: Primary | ICD-10-CM

## 2018-12-27 DIAGNOSIS — Z51.11 ENCOUNTER FOR CHEMOTHERAPY MANAGEMENT: ICD-10-CM

## 2018-12-27 DIAGNOSIS — Z51.11 ENCOUNTER FOR CHEMOTHERAPY MANAGEMENT: Primary | ICD-10-CM

## 2018-12-27 DIAGNOSIS — Z17.1 MALIGNANT NEOPLASM OF LOWER-OUTER QUADRANT OF FEMALE BREAST, ESTROGEN RECEPTOR NEGATIVE, UNSPECIFIED LATERALITY: Primary | ICD-10-CM

## 2018-12-27 LAB
ALBUMIN SERPL BCP-MCNC: 3.9 G/DL
ALP SERPL-CCNC: 79 U/L
ALT SERPL W/O P-5'-P-CCNC: 22 U/L
ANION GAP SERPL CALC-SCNC: 10 MMOL/L
AST SERPL-CCNC: 23 U/L
BILIRUB SERPL-MCNC: 0.6 MG/DL
BUN SERPL-MCNC: 17 MG/DL
CALCIUM SERPL-MCNC: 9.8 MG/DL
CHLORIDE SERPL-SCNC: 105 MMOL/L
CO2 SERPL-SCNC: 26 MMOL/L
CREAT SERPL-MCNC: 0.8 MG/DL
ERYTHROCYTE [DISTWIDTH] IN BLOOD BY AUTOMATED COUNT: 14.2 %
EST. GFR  (AFRICAN AMERICAN): >60 ML/MIN/1.73 M^2
EST. GFR  (NON AFRICAN AMERICAN): >60 ML/MIN/1.73 M^2
GLUCOSE SERPL-MCNC: 101 MG/DL
HCT VFR BLD AUTO: 40.7 %
HGB BLD-MCNC: 12.9 G/DL
IMM GRANULOCYTES # BLD AUTO: 0.01 K/UL
MCH RBC QN AUTO: 27.7 PG
MCHC RBC AUTO-ENTMCNC: 31.7 G/DL
MCV RBC AUTO: 87 FL
NEUTROPHILS # BLD AUTO: 5.8 K/UL
PLATELET # BLD AUTO: 290 K/UL
PMV BLD AUTO: 10.6 FL
POTASSIUM SERPL-SCNC: 4.8 MMOL/L
PROT SERPL-MCNC: 7.7 G/DL
RBC # BLD AUTO: 4.66 M/UL
SODIUM SERPL-SCNC: 141 MMOL/L
WBC # BLD AUTO: 7.94 K/UL

## 2018-12-27 PROCEDURE — 25000003 PHARM REV CODE 250: Performed by: INTERNAL MEDICINE

## 2018-12-27 PROCEDURE — 63600175 PHARM REV CODE 636 W HCPCS: Mod: JG | Performed by: INTERNAL MEDICINE

## 2018-12-27 PROCEDURE — 85027 COMPLETE CBC AUTOMATED: CPT

## 2018-12-27 PROCEDURE — 36415 COLL VENOUS BLD VENIPUNCTURE: CPT

## 2018-12-27 PROCEDURE — 96415 CHEMO IV INFUSION ADDL HR: CPT

## 2018-12-27 PROCEDURE — 96417 CHEMO IV INFUS EACH ADDL SEQ: CPT

## 2018-12-27 PROCEDURE — 96413 CHEMO IV INFUSION 1 HR: CPT

## 2018-12-27 PROCEDURE — 80053 COMPREHEN METABOLIC PANEL: CPT

## 2018-12-27 PROCEDURE — 96367 TX/PROPH/DG ADDL SEQ IV INF: CPT

## 2018-12-27 RX ORDER — SODIUM CHLORIDE 0.9 % (FLUSH) 0.9 %
10 SYRINGE (ML) INJECTION
Status: DISCONTINUED | OUTPATIENT
Start: 2018-12-27 | End: 2018-12-27 | Stop reason: HOSPADM

## 2018-12-27 RX ORDER — HEPARIN 100 UNIT/ML
500 SYRINGE INTRAVENOUS
Status: CANCELLED | OUTPATIENT
Start: 2018-12-27

## 2018-12-27 RX ORDER — SODIUM CHLORIDE 0.9 % (FLUSH) 0.9 %
10 SYRINGE (ML) INJECTION
Status: CANCELLED | OUTPATIENT
Start: 2018-12-27

## 2018-12-27 RX ORDER — HEPARIN 100 UNIT/ML
500 SYRINGE INTRAVENOUS
Status: DISCONTINUED | OUTPATIENT
Start: 2018-12-27 | End: 2018-12-27 | Stop reason: HOSPADM

## 2018-12-27 RX ADMIN — SODIUM CHLORIDE: 0.9 INJECTION, SOLUTION INTRAVENOUS at 09:12

## 2018-12-27 RX ADMIN — CARBOPLATIN 880 MG: 10 INJECTION, SOLUTION INTRAVENOUS at 03:12

## 2018-12-27 RX ADMIN — DEXAMETHASONE SODIUM PHOSPHATE: 4 INJECTION, SOLUTION INTRA-ARTICULAR; INTRALESIONAL; INTRAMUSCULAR; INTRAVENOUS; SOFT TISSUE at 10:12

## 2018-12-27 RX ADMIN — PERTUZUMAB 840 MG: 30 INJECTION, SOLUTION, CONCENTRATE INTRAVENOUS at 11:12

## 2018-12-27 RX ADMIN — DOCETAXEL ANHYDROUS 165 MG: 10 INJECTION, SOLUTION INTRAVENOUS at 02:12

## 2018-12-27 RX ADMIN — TRASTUZUMAB 834 MG: 150 INJECTION, POWDER, LYOPHILIZED, FOR SOLUTION INTRAVENOUS at 12:12

## 2018-12-27 RX ADMIN — HEPARIN 500 UNITS: 100 SYRINGE at 04:12

## 2018-12-27 NOTE — PLAN OF CARE
Problem: Adult Inpatient Plan of Care  Goal: Plan of Care Review  Outcome: Ongoing (interventions implemented as appropriate)  Pt admitted @ 9:45am for Day #1 of Cycle 1 of Perjeta, Herceptin, Taxotere, Carboplatin. Pt ambulated onto unit unassisted, accompanied by cousin. Labs reviewed and Pt oriented to unit. Pt states she did attend Chemo Class 12/07 and brought Chemo Binder. Chemo agents reviewed and side effects and self-care tips discussed. Pt instructed to have working digital thermometer at home to monitor temp, she will have today. Port accessed and Pt offered blanket and refreshments. Pt educated throughout course of tx, tolerated well. No signs of infusion reaction noted. Plan of care reviewed and Pt instructed to contact MD with any further concerns or questions, she verbalized understanding.AVS given to Pt and Pt discharged home @ 16:40 , ambulated off unit unassisted, accompanied by cousin

## 2019-01-16 ENCOUNTER — TELEPHONE (OUTPATIENT)
Dept: HEMATOLOGY/ONCOLOGY | Facility: CLINIC | Age: 64
End: 2019-01-16

## 2019-01-16 NOTE — TELEPHONE ENCOUNTER
----- Message from Lynsey Linares sent at 1/16/2019 12:35 PM CST -----  Contact: Pt   Pt needs to cancel the 1/17 appts (both) and reschedule   Please call [phone]

## 2019-01-16 NOTE — TELEPHONE ENCOUNTER
----- Message from Charis Craft sent at 1/16/2019  3:36 PM CST -----  Contact: Pt   11:30 1/22    ----- Message -----  From: Charlee Bright  Sent: 1/16/2019   3:34 PM  To: Charis Craft    Patient wants to see  not pj, please advise     ----- Message -----  From: Charis Craft  Sent: 1/16/2019   1:36 PM  To: Charlee Bright    1/21 with PJ and Sandy can talk at appt if needed    ----- Message -----  From: Charlee Bright  Sent: 1/16/2019   1:25 PM  To: Sandy GLALARDO Staff    Returned call, spoke with patient in regards to changing her apt. She stated that she is wanting to speak with  before having her chemo.   Please advise where we can schedule her. She stated that Monday or any day next week would work for her.     ----- Message -----  From: Lynsey Linares  Sent: 1/16/2019  12:35 PM  To: Charlee Bright    Pt needs to cancel the 1/17 appts (both) and reschedule   Please call [phone]

## 2019-01-16 NOTE — TELEPHONE ENCOUNTER
Returned call, spoke with patient in regards to changing her apt. She stated that she is wanting to speak with  before having her chemo.She stated that Monday or any day next week would work for her.   I told her I would speak with the nurse and get back with her. She voiced appreciation

## 2019-01-16 NOTE — TELEPHONE ENCOUNTER
Returned call, spoke with patient and assisted with changing her appointments to Tuesday 1/22 as she desires to see  to discuss questions she has

## 2019-01-18 ENCOUNTER — OFFICE VISIT (OUTPATIENT)
Dept: PLASTIC SURGERY | Facility: CLINIC | Age: 64
End: 2019-01-18
Payer: COMMERCIAL

## 2019-01-18 VITALS
HEIGHT: 66 IN | DIASTOLIC BLOOD PRESSURE: 81 MMHG | BODY MASS INDEX: 35.66 KG/M2 | HEART RATE: 57 BPM | WEIGHT: 221.88 LBS | SYSTOLIC BLOOD PRESSURE: 145 MMHG

## 2019-01-18 DIAGNOSIS — Z09 SURGERY FOLLOW-UP EXAMINATION: Primary | ICD-10-CM

## 2019-01-18 PROCEDURE — 99024 POSTOP FOLLOW-UP VISIT: CPT | Mod: S$GLB,,, | Performed by: SURGERY

## 2019-01-18 PROCEDURE — 99024 PR POST-OP FOLLOW-UP VISIT: ICD-10-PCS | Mod: S$GLB,,, | Performed by: SURGERY

## 2019-01-18 PROCEDURE — 99999 PR PBB SHADOW E&M-EST. PATIENT-LVL III: CPT | Mod: PBBFAC,,, | Performed by: SURGERY

## 2019-01-18 PROCEDURE — 99999 PR PBB SHADOW E&M-EST. PATIENT-LVL III: ICD-10-PCS | Mod: PBBFAC,,, | Performed by: SURGERY

## 2019-01-18 NOTE — PROGRESS NOTES
Doing very well.  Everything is well healed.  RTC in approx 6 weeks. One week post third round of chemo.

## 2019-01-21 ENCOUNTER — TELEPHONE (OUTPATIENT)
Dept: HEMATOLOGY/ONCOLOGY | Facility: CLINIC | Age: 64
End: 2019-01-21

## 2019-01-21 ENCOUNTER — PATIENT MESSAGE (OUTPATIENT)
Dept: HEMATOLOGY/ONCOLOGY | Facility: CLINIC | Age: 64
End: 2019-01-21

## 2019-01-21 DIAGNOSIS — Z51.11 ENCOUNTER FOR CHEMOTHERAPY MANAGEMENT: Primary | ICD-10-CM

## 2019-01-21 NOTE — TELEPHONE ENCOUNTER
----- Message from Charis Craft sent at 1/21/2019  3:17 PM CST -----  Contact: Patient  Doesn't need to be seen tomorrow per Sandy- PATIENCE in 3 weeks with labs, EP, and chemo    ----- Message -----  From: Laura Delgado MD  Sent: 1/21/2019   3:14 PM  To: Charis Craft    We had this appt to verify no further questions and get consent  Is she comfortable with just signing consent?    ----- Message -----  From: Charis Craft  Sent: 1/21/2019   3:13 PM  To: Laura Delgado MD    She needs to be seen prior to chemo (she also was the one who had multiple questions during consent)?    ----- Message -----  From: Charlee Bright  Sent: 1/21/2019   3:07 PM  To: Sandy Sanchez L Staff    Patient cancelled her apt with  on tomorrow (phone staff cancelled it)  Is this okay ? She wants to just have her labs and chemo   ----- Message -----  From: Travis Mejia  Sent: 1/21/2019   2:18 PM  To: Charlee Bright    Pt wants to move the time of her 01/22 appt, she wants to have her lab at 9:30 and chemo at 10:30-11. And cancel appt with Dr. Delgado.    Contact:: [phone]

## 2019-01-21 NOTE — TELEPHONE ENCOUNTER
Returned call, spoke with patient and assisted with changing her labs and chemo apt for tomorrow as if she waits to start her chemo at 1pm it will be dark when leaving and they can not drive in dark

## 2019-01-22 ENCOUNTER — OFFICE VISIT (OUTPATIENT)
Dept: HEMATOLOGY/ONCOLOGY | Facility: CLINIC | Age: 64
End: 2019-01-22
Payer: COMMERCIAL

## 2019-01-22 ENCOUNTER — LAB VISIT (OUTPATIENT)
Dept: LAB | Facility: HOSPITAL | Age: 64
End: 2019-01-22
Attending: INTERNAL MEDICINE
Payer: COMMERCIAL

## 2019-01-22 ENCOUNTER — INFUSION (OUTPATIENT)
Dept: INFUSION THERAPY | Facility: HOSPITAL | Age: 64
End: 2019-01-22
Attending: INTERNAL MEDICINE
Payer: COMMERCIAL

## 2019-01-22 VITALS
DIASTOLIC BLOOD PRESSURE: 82 MMHG | WEIGHT: 219.56 LBS | HEART RATE: 66 BPM | BODY MASS INDEX: 35.29 KG/M2 | OXYGEN SATURATION: 97 % | RESPIRATION RATE: 18 BRPM | SYSTOLIC BLOOD PRESSURE: 160 MMHG | TEMPERATURE: 99 F | HEIGHT: 66 IN

## 2019-01-22 VITALS
WEIGHT: 221 LBS | RESPIRATION RATE: 18 BRPM | TEMPERATURE: 99 F | HEIGHT: 66 IN | BODY MASS INDEX: 35.52 KG/M2 | SYSTOLIC BLOOD PRESSURE: 140 MMHG | DIASTOLIC BLOOD PRESSURE: 76 MMHG | HEART RATE: 64 BPM

## 2019-01-22 DIAGNOSIS — C50.112 MALIGNANT NEOPLASM OF CENTRAL PORTION OF LEFT BREAST IN FEMALE, ESTROGEN RECEPTOR POSITIVE: Primary | ICD-10-CM

## 2019-01-22 DIAGNOSIS — Z85.3 HISTORY OF BREAST CANCER: ICD-10-CM

## 2019-01-22 DIAGNOSIS — Z17.1 MALIGNANT NEOPLASM OF LOWER-OUTER QUADRANT OF FEMALE BREAST, ESTROGEN RECEPTOR NEGATIVE, UNSPECIFIED LATERALITY: Primary | ICD-10-CM

## 2019-01-22 DIAGNOSIS — Z17.0 MALIGNANT NEOPLASM OF CENTRAL PORTION OF LEFT BREAST IN FEMALE, ESTROGEN RECEPTOR POSITIVE: Primary | ICD-10-CM

## 2019-01-22 DIAGNOSIS — C50.519 MALIGNANT NEOPLASM OF LOWER-OUTER QUADRANT OF FEMALE BREAST, ESTROGEN RECEPTOR NEGATIVE, UNSPECIFIED LATERALITY: Primary | ICD-10-CM

## 2019-01-22 LAB
ALBUMIN SERPL BCP-MCNC: 4.1 G/DL
ALP SERPL-CCNC: 86 U/L
ALT SERPL W/O P-5'-P-CCNC: 25 U/L
ANION GAP SERPL CALC-SCNC: 9 MMOL/L
AST SERPL-CCNC: 26 U/L
BILIRUB SERPL-MCNC: 0.7 MG/DL
BUN SERPL-MCNC: 11 MG/DL
CALCIUM SERPL-MCNC: 10 MG/DL
CHLORIDE SERPL-SCNC: 107 MMOL/L
CO2 SERPL-SCNC: 25 MMOL/L
CREAT SERPL-MCNC: 0.8 MG/DL
ERYTHROCYTE [DISTWIDTH] IN BLOOD BY AUTOMATED COUNT: 14.2 %
EST. GFR  (AFRICAN AMERICAN): >60 ML/MIN/1.73 M^2
EST. GFR  (NON AFRICAN AMERICAN): >60 ML/MIN/1.73 M^2
GLUCOSE SERPL-MCNC: 104 MG/DL
HCT VFR BLD AUTO: 39.1 %
HGB BLD-MCNC: 12.5 G/DL
IMM GRANULOCYTES # BLD AUTO: 0.01 K/UL
MCH RBC QN AUTO: 27.1 PG
MCHC RBC AUTO-ENTMCNC: 32 G/DL
MCV RBC AUTO: 85 FL
NEUTROPHILS # BLD AUTO: 4.1 K/UL
PLATELET # BLD AUTO: 181 K/UL
PMV BLD AUTO: 10.5 FL
POTASSIUM SERPL-SCNC: 4.4 MMOL/L
PROT SERPL-MCNC: 7.9 G/DL
RBC # BLD AUTO: 4.61 M/UL
SODIUM SERPL-SCNC: 141 MMOL/L
WBC # BLD AUTO: 5.72 K/UL

## 2019-01-22 PROCEDURE — 96367 TX/PROPH/DG ADDL SEQ IV INF: CPT

## 2019-01-22 PROCEDURE — 3008F PR BODY MASS INDEX (BMI) DOCUMENTED: ICD-10-PCS | Mod: S$GLB,,, | Performed by: INTERNAL MEDICINE

## 2019-01-22 PROCEDURE — 99214 PR OFFICE/OUTPT VISIT, EST, LEVL IV, 30-39 MIN: ICD-10-PCS | Mod: S$GLB,,, | Performed by: INTERNAL MEDICINE

## 2019-01-22 PROCEDURE — 99999 PR PBB SHADOW E&M-EST. PATIENT-LVL III: CPT | Mod: PBBFAC,,, | Performed by: INTERNAL MEDICINE

## 2019-01-22 PROCEDURE — 3008F BODY MASS INDEX DOCD: CPT | Mod: S$GLB,,, | Performed by: INTERNAL MEDICINE

## 2019-01-22 PROCEDURE — A4216 STERILE WATER/SALINE, 10 ML: HCPCS | Performed by: INTERNAL MEDICINE

## 2019-01-22 PROCEDURE — 99999 PR PBB SHADOW E&M-EST. PATIENT-LVL III: ICD-10-PCS | Mod: PBBFAC,,, | Performed by: INTERNAL MEDICINE

## 2019-01-22 PROCEDURE — 96417 CHEMO IV INFUS EACH ADDL SEQ: CPT

## 2019-01-22 PROCEDURE — 80053 COMPREHEN METABOLIC PANEL: CPT

## 2019-01-22 PROCEDURE — 63600175 PHARM REV CODE 636 W HCPCS: Performed by: INTERNAL MEDICINE

## 2019-01-22 PROCEDURE — 25000003 PHARM REV CODE 250: Performed by: INTERNAL MEDICINE

## 2019-01-22 PROCEDURE — 85027 COMPLETE CBC AUTOMATED: CPT

## 2019-01-22 PROCEDURE — 96413 CHEMO IV INFUSION 1 HR: CPT

## 2019-01-22 PROCEDURE — 36415 COLL VENOUS BLD VENIPUNCTURE: CPT

## 2019-01-22 PROCEDURE — 99214 OFFICE O/P EST MOD 30 MIN: CPT | Mod: S$GLB,,, | Performed by: INTERNAL MEDICINE

## 2019-01-22 RX ORDER — HEPARIN 100 UNIT/ML
500 SYRINGE INTRAVENOUS
Status: CANCELLED | OUTPATIENT
Start: 2019-01-22

## 2019-01-22 RX ORDER — HEPARIN 100 UNIT/ML
500 SYRINGE INTRAVENOUS
Status: DISCONTINUED | OUTPATIENT
Start: 2019-01-22 | End: 2019-01-22 | Stop reason: HOSPADM

## 2019-01-22 RX ORDER — SODIUM CHLORIDE 0.9 % (FLUSH) 0.9 %
10 SYRINGE (ML) INJECTION
Status: DISCONTINUED | OUTPATIENT
Start: 2019-01-22 | End: 2019-01-22 | Stop reason: HOSPADM

## 2019-01-22 RX ORDER — SODIUM CHLORIDE 0.9 % (FLUSH) 0.9 %
10 SYRINGE (ML) INJECTION
Status: CANCELLED | OUTPATIENT
Start: 2019-01-22

## 2019-01-22 RX ORDER — DIPHENOXYLATE HYDROCHLORIDE AND ATROPINE SULFATE 2.5; .025 MG/1; MG/1
1 TABLET ORAL 4 TIMES DAILY PRN
Qty: 30 TABLET | Refills: 1 | Status: SHIPPED | OUTPATIENT
Start: 2019-01-22 | End: 2020-01-22

## 2019-01-22 RX ADMIN — Medication 10 ML: at 05:01

## 2019-01-22 RX ADMIN — DEXAMETHASONE SODIUM PHOSPHATE: 4 INJECTION, SOLUTION INTRA-ARTICULAR; INTRALESIONAL; INTRAMUSCULAR; INTRAVENOUS; SOFT TISSUE at 12:01

## 2019-01-22 RX ADMIN — DOCETAXEL 160 MG: 10 INJECTION, SOLUTION INTRAVENOUS at 02:01

## 2019-01-22 RX ADMIN — TRASTUZUMAB 601 MG: 150 INJECTION, POWDER, LYOPHILIZED, FOR SOLUTION INTRAVENOUS at 02:01

## 2019-01-22 RX ADMIN — PERTUZUMAB 420 MG: 30 INJECTION, SOLUTION, CONCENTRATE INTRAVENOUS at 01:01

## 2019-01-22 RX ADMIN — SODIUM CHLORIDE: 0.9 INJECTION, SOLUTION INTRAVENOUS at 12:01

## 2019-01-22 RX ADMIN — HEPARIN SODIUM (PORCINE) LOCK FLUSH IV SOLN 100 UNIT/ML 500 UNITS: 100 SOLUTION at 05:01

## 2019-01-22 RX ADMIN — CARBOPLATIN 850 MG: 10 INJECTION, SOLUTION INTRAVENOUS at 04:01

## 2019-01-22 NOTE — PLAN OF CARE
Problem: Adult Inpatient Plan of Care  Goal: Plan of Care Review  Outcome: Ongoing (interventions implemented as appropriate)  Pt here for PHTC infusion D1C2, labs, hx, meds, allergies reviewed, pt with c/o oral mucositis but improving, reclined inc hair, warm blanket provided, continue to monitor

## 2019-01-22 NOTE — PROGRESS NOTES
"Subjective:       Patient ID: Beatriz Faust is a 63 y.o. female.    Chief Complaint: No chief complaint on file.    HPI       Oncologic History:       Oncologic History See below    Oncologic Treatment Surgery to date    Pathology [id number] ductal      Reports tolerated cycle 1 ok- she did however note several side effects she managed alone  Day 2 developed a head cold and did note fever- took silversolve as she had this and self monitored and cleared with time  Developed "brush burn" from mouth to anus  Some dysuria noted now resolved  + diarrhea x 8 days- she made sure she "mineralized her body and stayed hydrated"  Took immodium as well but felt it did not help  Used Smoothies  Noted 1 mouth sore and did self mouth care  Noted hair loss    Oncology History:  Initially evaluated by medical oncology at an OSH and declined NA chemotherapy at that time  - no mammograms in > 15 years  - presented to outside ED in early July 2018 for abdominal pain and incidentally a breast mass was seen on CT  - at an OSH she underwent a MMG/US and core needle bx which came back as high grade invasive ductal carcinoma ER/KS/Her2 +.    - She was originally seen at [facility] where she was recommended to have neoadjuvant chemotherapy which she declined  - 9/4/18 Breast MRI:  Impression:  Left  Mass: Left breast 26 mm x 22 mm x 19 mm mass at the middle 5 o'clock position. Assessment: 6 - Known biopsy, proven malignancy.   Right  There is no MR evidence of malignancy.  BI-RADS Category:   Overall: 6 - Known Biopsy-Proven Malignancy  - 9/8/18 PET scan:  Findings:  Hypermetabolic 2.5 cm left breast mass with SUV max of 4.7.  No additional abnormal foci of increased tracer uptake.  Physiologic uptake of the tracer is present within the brain, salivary glands, myocardium, GI and  tracts.  Incidental CT findings: 5 mm pulmonary nodule in the right lower lobe without corresponding hypermetabolic activity, and below the size threshold for PET " sensitivity.  Impression:  Hypermetabolic 2.5 cm left breast mass compatible with history of invasive ductal carcinoma.          Malignant neoplasm of overlapping sites of left breast in female, estrogen receptor positive     7/20/2018 Biopsy       2.4 cm irregular solid mass at 5 o'clock correlating to the mammographic finding            7/20/2018 Initial Diagnosis       BREAST MASS, LEFT (NEEDLE BIOPSY):  Invasive ductal adenocarcinoma, grade 3  Tubule formation 3, nuclear pleomorphism 3, mitosis 2  Velma histologic score 8  Invasive tumor size: 1.2 centimeters  Ductal carcinoma in situ (DCIS, high nuclear grade            7/20/2018 Tumor Markers       Estrogen Receptor: Positive >90%  Progesterone Receptor: Positive 0-10%  HER2: Positive  Ki67: >30%            10/29/2018 Surgery       · Left breast mastectomy, pT2 pN1a  · Invasive ductal carcinoma, grade 3, 2.9 cm  · DCIS, grade 3, 0.1 cm  · Margins uninvolved, inv ca and DCIS are 5.6 cm from anterior  · 1/16 positive lymph nodes, largest focus is 0.3 cm, positive for extranodal extension   · Right breast mastectomy: Benign                10/29/2018 Cancer Staged       Cancer Staging  pT2 pN1a.  Stage IB per AJCC 8th ed. pathologic prognostic staging system               11/27/2018 Tumor Conference Documentation       Patient declined chemotherapy pre-op, however now with positive lymph node she is willing to discuss. Will need port placement and HER2 guided therapy.      Chances of cure with surgery alone is 30-40%. With chemotherapy, could reduce this by about half, improving her outcome by about 25%.      Would recommend Herceptin and Perjeta (if approved by her insurance) possibly followed by Neratinib. Neratinib may be less helpful in ER+ setting.       Radiation not indicated.        Review of Systems   Constitutional: Negative for activity change, chills, fatigue, fever and unexpected weight change (dropped 30 lbs with diet changes).   HENT: Negative  for congestion, hearing loss, sinus pressure, sore throat and trouble swallowing.    Eyes: Negative for visual disturbance.   Respiratory: Negative for cough, shortness of breath and wheezing.    Cardiovascular: Negative for chest pain, palpitations and leg swelling.   Gastrointestinal: Positive for diarrhea. Negative for abdominal distention, abdominal pain, blood in stool, constipation, nausea and vomiting.        Prior colonoscopy- at [facility] > 10 years   Genitourinary: Negative for decreased urine volume, difficulty urinating, dysuria, frequency, hematuria and urgency.   Musculoskeletal: Positive for gait problem (see above). Negative for arthralgias (knees and hips, chronic) and back pain.   Skin: Positive for wound. Negative for color change, pallor and rash.   Neurological: Negative for dizziness, weakness, numbness (at surgical site) and headaches.   Hematological: Negative for adenopathy. Does not bruise/bleed easily.   Psychiatric/Behavioral: Negative for dysphoric mood and sleep disturbance. The patient is not nervous/anxious.        Objective:      Physical Exam   Constitutional: She is oriented to person, place, and time. She appears well-developed and well-nourished. No distress.   Presents with her mom   HENT:   Head: Normocephalic and atraumatic.   Mouth/Throat: No oropharyngeal exudate.   Eyes: Conjunctivae and EOM are normal. Pupils are equal, round, and reactive to light. No scleral icterus. Right pupil is round and reactive. Left pupil is round and reactive.   Neck: Normal range of motion. Neck supple. No thyromegaly present.   Cardiovascular: Normal rate, regular rhythm, normal heart sounds and intact distal pulses. Exam reveals no gallop and no friction rub.   No murmur heard.  Pulmonary/Chest: Effort normal and breath sounds normal. No respiratory distress. She has no wheezes. She has no rales.   Breasts- post bilateral mastectomies with reconstruction  Right chest wall port    Abdominal: Soft. Bowel sounds are normal. She exhibits no distension and no mass. There is no hepatosplenomegaly. There is no tenderness. There is no rebound and no guarding.   No organomegaly   Musculoskeletal: Normal range of motion. She exhibits no edema, tenderness or deformity.   Lymphadenopathy:        Head (right side): No submandibular adenopathy present.        Head (left side): No submandibular adenopathy present.     She has no cervical adenopathy.        Right cervical: No superficial cervical, no deep cervical and no posterior cervical adenopathy present.       Left cervical: No superficial cervical, no deep cervical and no posterior cervical adenopathy present.        Right: No inguinal and no supraclavicular adenopathy present.        Left: No inguinal and no supraclavicular adenopathy present.   Neurological: She is alert and oriented to person, place, and time. She has normal strength and normal reflexes. No cranial nerve deficit or sensory deficit. Coordination normal.   Skin: Skin is warm and dry. No bruising, no lesion, no petechiae and no rash noted. She is not diaphoretic. No erythema. No pallor.   Psychiatric: She has a normal mood and affect. Her behavior is normal. Judgment and thought content normal. Her mood appears not anxious. She does not exhibit a depressed mood.   Nursing note and vitals reviewed.    Labs- reviewed  Assessment:       1. Malignant neoplasm of central portion of left breast in female, estrogen receptor positive        Plan:     1. Labs reviewed  Discussed management of side effects addressed above and reminded her to call us for any issues  Prescriptions for lomotil and magic mouthwash sent  She knows to call for any issues   RTC 3 weeks with labs and EP      Distress Screening Results: Psychosocial Distress screening score of Distress Score: 7 noted and reviewed. No intervention indicated.   Counseled on side effects.

## 2019-01-22 NOTE — PLAN OF CARE
Problem: Adult Inpatient Plan of Care  Goal: Plan of Care Review  Outcome: Ongoing (interventions implemented as appropriate)  Pt tolerated PHTC infusion without issue, pt to rtc 2/12/19, no distress noted upon d/c to home

## 2019-01-23 ENCOUNTER — TELEPHONE (OUTPATIENT)
Dept: HEMATOLOGY/ONCOLOGY | Facility: CLINIC | Age: 64
End: 2019-01-23

## 2019-01-23 NOTE — TELEPHONE ENCOUNTER
Returned call to pharmacy.    Left message for pharmacy with clarifications on rx.   Callback number provided if any further questions.          ----- Message from Travis Mejia sent at 1/23/2019  9:29 AM CST -----  Contact: [facility]  Pharmacy needs clarification on Rx didiphenhydrAMINE-aluminum-magnesium hydroxide-simethicone-lidocaine HCl 2%.    Contact:: [phone]

## 2019-01-24 ENCOUNTER — PATIENT MESSAGE (OUTPATIENT)
Dept: ADMINISTRATIVE | Facility: OTHER | Age: 64
End: 2019-01-24

## 2019-02-11 ENCOUNTER — TELEPHONE (OUTPATIENT)
Dept: HEMATOLOGY/ONCOLOGY | Facility: CLINIC | Age: 64
End: 2019-02-11

## 2019-02-11 NOTE — TELEPHONE ENCOUNTER
Message fwd to .         ----- Message from Lynsey Linares sent at 2/11/2019  2:11 PM CST -----  Contact: Pt  Pt would like to speak to someone about making changes to her chemo appts and locations. She will not be having any other appts at main campus, she wants to cancel all of them.  Please contact her at [phone]

## 2019-02-13 ENCOUNTER — TELEPHONE (OUTPATIENT)
Dept: HEMATOLOGY/ONCOLOGY | Facility: CLINIC | Age: 64
End: 2019-02-13

## 2019-03-13 ENCOUNTER — TELEPHONE (OUTPATIENT)
Dept: HEMATOLOGY/ONCOLOGY | Facility: CLINIC | Age: 64
End: 2019-03-13

## 2019-03-13 ENCOUNTER — PATIENT MESSAGE (OUTPATIENT)
Dept: HEMATOLOGY/ONCOLOGY | Facility: CLINIC | Age: 64
End: 2019-03-13

## 2019-03-13 NOTE — TELEPHONE ENCOUNTER
Received call from pt to nurse ext from phone staff.   Pt stated needs a return to work sent over to her work as her return to work date was 3/10 and needs a letter dating 3/11 for fulltime status.   Pt would like letter faxed to [phone].   Informed pt that looks like she no showed on 2/12 for her chemo- pt stated that she wants her chemos set up at [facility] in future.   Informed pt would notify Dr. Delgado and .   Pt verbalized understanding.       Message fwd.

## 2019-03-13 NOTE — TELEPHONE ENCOUNTER
Returned call to pt.  Pt unavailable.   Left message for pt to return call.   Callback number provided.         ----- Message from Julita Patterson sent at 3/13/2019 12:16 PM CDT -----  Contact: pt   Type:  Patient Returning Call    Who Called:  Pt   Who Left Message for Patient:   terry   Does the patient know what this is regarding?: need a return to work paper   Best Call Back Number: [phone]   Additional Information:   And ask if you can fax it to [phone] attn to Maddie Mazariegos  And needs a date of 3/11/19  Thank You  ASMITA Patterson

## 2019-03-13 NOTE — TELEPHONE ENCOUNTER
Message fwd to MD for follow up as last seen in January (pt no showed in February).         ----- Message from Lynsey Linares sent at 3/13/2019  4:04 PM CDT -----  Contact: Pt [phone]  Pt is trying to return to work and was initially requesting a letter dated 3/11 to be sent to the employer that she's clear to come in again. She is a  and needed to go back in asa. Advised that she will have to schedule to see the M.D  Please contact her to schedule next follow appt. Thank you

## 2019-03-13 NOTE — TELEPHONE ENCOUNTER
Returned call to pt.  Pt unavailable.   Left message for pt to return call.   Callback number provided.       ----- Message from Lynsey Linares sent at 3/13/2019  8:59 AM CDT -----  Contact: Pt   Pt called and requested that a document clearing her to return to work be sent to the employer as soon as possible. She was just notified that its a requirement after she attempted to go in to work again. Please contact her with any questions or confirmation that it has been sent    Please send document too:  Fax: [phone]   Attn : Maddie Mazariegos  Date: March 11, 2019    Thank you!!!!

## 2019-06-04 ENCOUNTER — LAB VISIT (OUTPATIENT)
Dept: LAB | Facility: HOSPITAL | Age: 64
End: 2019-06-04
Attending: INTERNAL MEDICINE
Payer: COMMERCIAL

## 2019-06-04 ENCOUNTER — OFFICE VISIT (OUTPATIENT)
Dept: HEMATOLOGY/ONCOLOGY | Facility: CLINIC | Age: 64
End: 2019-06-04
Attending: INTERNAL MEDICINE
Payer: COMMERCIAL

## 2019-06-04 VITALS
RESPIRATION RATE: 18 BRPM | BODY MASS INDEX: 33.77 KG/M2 | DIASTOLIC BLOOD PRESSURE: 79 MMHG | OXYGEN SATURATION: 98 % | TEMPERATURE: 98 F | WEIGHT: 210.13 LBS | HEART RATE: 56 BPM | SYSTOLIC BLOOD PRESSURE: 162 MMHG | HEIGHT: 66 IN

## 2019-06-04 DIAGNOSIS — K64.9 HEMORRHOIDS, UNSPECIFIED HEMORRHOID TYPE: ICD-10-CM

## 2019-06-04 DIAGNOSIS — Z17.0 MALIGNANT NEOPLASM OF UPPER-INNER QUADRANT OF LEFT BREAST IN FEMALE, ESTROGEN RECEPTOR POSITIVE: Primary | ICD-10-CM

## 2019-06-04 DIAGNOSIS — Z17.0 MALIGNANT NEOPLASM OF UPPER-INNER QUADRANT OF LEFT BREAST IN FEMALE, ESTROGEN RECEPTOR POSITIVE: ICD-10-CM

## 2019-06-04 DIAGNOSIS — C50.212 MALIGNANT NEOPLASM OF UPPER-INNER QUADRANT OF LEFT BREAST IN FEMALE, ESTROGEN RECEPTOR POSITIVE: ICD-10-CM

## 2019-06-04 DIAGNOSIS — C50.212 MALIGNANT NEOPLASM OF UPPER-INNER QUADRANT OF LEFT BREAST IN FEMALE, ESTROGEN RECEPTOR POSITIVE: Primary | ICD-10-CM

## 2019-06-04 LAB
ALBUMIN SERPL BCP-MCNC: 4.1 G/DL (ref 3.5–5.2)
ALP SERPL-CCNC: 74 U/L (ref 55–135)
ALT SERPL W/O P-5'-P-CCNC: 25 U/L (ref 10–44)
ANION GAP SERPL CALC-SCNC: 9 MMOL/L (ref 8–16)
AST SERPL-CCNC: 24 U/L (ref 10–40)
BASOPHILS # BLD AUTO: 0.05 K/UL (ref 0–0.2)
BASOPHILS NFR BLD: 0.7 % (ref 0–1.9)
BILIRUB SERPL-MCNC: 0.3 MG/DL (ref 0.1–1)
BUN SERPL-MCNC: 18 MG/DL (ref 8–23)
CALCIUM SERPL-MCNC: 9.8 MG/DL (ref 8.7–10.5)
CHLORIDE SERPL-SCNC: 109 MMOL/L (ref 95–110)
CO2 SERPL-SCNC: 22 MMOL/L (ref 23–29)
CREAT SERPL-MCNC: 0.8 MG/DL (ref 0.5–1.4)
DIFFERENTIAL METHOD: NORMAL
EOSINOPHIL # BLD AUTO: 0.1 K/UL (ref 0–0.5)
EOSINOPHIL NFR BLD: 1.3 % (ref 0–8)
ERYTHROCYTE [DISTWIDTH] IN BLOOD BY AUTOMATED COUNT: 13.4 % (ref 11.5–14.5)
EST. GFR  (AFRICAN AMERICAN): >60 ML/MIN/1.73 M^2
EST. GFR  (NON AFRICAN AMERICAN): >60 ML/MIN/1.73 M^2
GLUCOSE SERPL-MCNC: 99 MG/DL (ref 70–110)
HCT VFR BLD AUTO: 41.7 % (ref 37–48.5)
HGB BLD-MCNC: 13.6 G/DL (ref 12–16)
IMM GRANULOCYTES # BLD AUTO: 0.01 K/UL (ref 0–0.04)
IMM GRANULOCYTES NFR BLD AUTO: 0.1 % (ref 0–0.5)
LYMPHOCYTES # BLD AUTO: 1.5 K/UL (ref 1–4.8)
LYMPHOCYTES NFR BLD: 22 % (ref 18–48)
MCH RBC QN AUTO: 28.3 PG (ref 27–31)
MCHC RBC AUTO-ENTMCNC: 32.6 G/DL (ref 32–36)
MCV RBC AUTO: 87 FL (ref 82–98)
MONOCYTES # BLD AUTO: 0.3 K/UL (ref 0.3–1)
MONOCYTES NFR BLD: 4.5 % (ref 4–15)
NEUTROPHILS # BLD AUTO: 4.8 K/UL (ref 1.8–7.7)
NEUTROPHILS NFR BLD: 71.4 % (ref 38–73)
NRBC BLD-RTO: 0 /100 WBC
PLATELET # BLD AUTO: 206 K/UL (ref 150–350)
PMV BLD AUTO: 11.3 FL (ref 9.2–12.9)
POTASSIUM SERPL-SCNC: 4.8 MMOL/L (ref 3.5–5.1)
PROT SERPL-MCNC: 7.8 G/DL (ref 6–8.4)
RBC # BLD AUTO: 4.8 M/UL (ref 4–5.4)
SODIUM SERPL-SCNC: 140 MMOL/L (ref 136–145)
WBC # BLD AUTO: 6.74 K/UL (ref 3.9–12.7)

## 2019-06-04 PROCEDURE — 3008F PR BODY MASS INDEX (BMI) DOCUMENTED: ICD-10-PCS | Mod: S$GLB,,, | Performed by: INTERNAL MEDICINE

## 2019-06-04 PROCEDURE — 3008F BODY MASS INDEX DOCD: CPT | Mod: S$GLB,,, | Performed by: INTERNAL MEDICINE

## 2019-06-04 PROCEDURE — 99999 PR PBB SHADOW E&M-EST. PATIENT-LVL IV: ICD-10-PCS | Mod: PBBFAC,,, | Performed by: INTERNAL MEDICINE

## 2019-06-04 PROCEDURE — 99214 OFFICE O/P EST MOD 30 MIN: CPT | Mod: S$GLB,,, | Performed by: INTERNAL MEDICINE

## 2019-06-04 PROCEDURE — 80053 COMPREHEN METABOLIC PANEL: CPT

## 2019-06-04 PROCEDURE — 99999 PR PBB SHADOW E&M-EST. PATIENT-LVL IV: CPT | Mod: PBBFAC,,, | Performed by: INTERNAL MEDICINE

## 2019-06-04 PROCEDURE — 99214 PR OFFICE/OUTPT VISIT, EST, LEVL IV, 30-39 MIN: ICD-10-PCS | Mod: S$GLB,,, | Performed by: INTERNAL MEDICINE

## 2019-06-04 PROCEDURE — 36415 COLL VENOUS BLD VENIPUNCTURE: CPT

## 2019-06-04 PROCEDURE — 85025 COMPLETE CBC W/AUTO DIFF WBC: CPT
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
## 2019-06-06 ENCOUNTER — TELEPHONE (OUTPATIENT)
Dept: HEMATOLOGY/ONCOLOGY | Facility: CLINIC | Age: 64
End: 2019-06-06

## 2019-06-06 PROBLEM — K64.9 HEMORRHOIDS: Status: ACTIVE | Noted: 2019-06-06

## 2019-06-06 NOTE — TELEPHONE ENCOUNTER
----- Message from Laura Delgado MD sent at 6/6/2019 12:51 PM CDT -----  - RTC 3 months with labs  - colorectal appt

## 2019-06-10 ENCOUNTER — TELEPHONE (OUTPATIENT)
Dept: HEMATOLOGY/ONCOLOGY | Facility: CLINIC | Age: 64
End: 2019-06-10

## 2019-06-10 NOTE — TELEPHONE ENCOUNTER
Noted.       ----- Message from Laura Delgado MD sent at 6/10/2019  2:38 PM CDT -----  She states has not had time to review paperwork regarding tamoxifen vs AI  She states she will call me back before week is out to decide her plan

## 2019-09-05 ENCOUNTER — PATIENT MESSAGE (OUTPATIENT)
Dept: HEMATOLOGY/ONCOLOGY | Facility: CLINIC | Age: 64
End: 2019-09-05

## 2019-12-08 ENCOUNTER — HOSPITAL ENCOUNTER (EMERGENCY)
Facility: HOSPITAL | Age: 64
Discharge: HOME | End: 2019-12-08
Attending: EMERGENCY MEDICINE
Payer: COMMERCIAL

## 2019-12-08 VITALS
DIASTOLIC BLOOD PRESSURE: 77 MMHG | BODY MASS INDEX: 35.36 KG/M2 | OXYGEN SATURATION: 99 % | SYSTOLIC BLOOD PRESSURE: 146 MMHG | HEIGHT: 66 IN | RESPIRATION RATE: 17 BRPM | TEMPERATURE: 98 F | WEIGHT: 220 LBS | HEART RATE: 55 BPM

## 2019-12-08 DIAGNOSIS — B02.9 HERPES ZOSTER WITHOUT COMPLICATION: Primary | ICD-10-CM

## 2019-12-08 DIAGNOSIS — R05.9 COUGH: ICD-10-CM

## 2019-12-08 DIAGNOSIS — H70.002 MASTOIDITIS, ACUTE, LEFT: ICD-10-CM

## 2019-12-08 DIAGNOSIS — H65.192 ACUTE MUCOID OTITIS MEDIA OF LEFT EAR: ICD-10-CM

## 2019-12-08 LAB
ALBUMIN SERPL BCP-MCNC: 3.8 G/DL (ref 3.5–5.2)
ALP SERPL-CCNC: 60 U/L (ref 55–135)
ALT SERPL W/O P-5'-P-CCNC: 25 U/L (ref 10–44)
ANION GAP SERPL CALC-SCNC: 13 MMOL/L (ref 8–16)
AST SERPL-CCNC: 20 U/L (ref 10–40)
BASOPHILS # BLD AUTO: 0.07 K/UL (ref 0–0.2)
BASOPHILS NFR BLD: 0.6 % (ref 0–1.9)
BILIRUB SERPL-MCNC: 0.5 MG/DL (ref 0.1–1)
BILIRUB UR QL STRIP: NEGATIVE
BUN SERPL-MCNC: 15 MG/DL (ref 8–23)
CALCIUM SERPL-MCNC: 9.2 MG/DL (ref 8.7–10.5)
CHLORIDE SERPL-SCNC: 99 MMOL/L (ref 95–110)
CLARITY UR: CLEAR
CO2 SERPL-SCNC: 26 MMOL/L (ref 23–29)
COLOR UR: YELLOW
CREAT SERPL-MCNC: 0.7 MG/DL (ref 0.5–1.4)
DIFFERENTIAL METHOD: ABNORMAL
EOSINOPHIL # BLD AUTO: 0.1 K/UL (ref 0–0.5)
EOSINOPHIL NFR BLD: 0.4 % (ref 0–8)
ERYTHROCYTE [DISTWIDTH] IN BLOOD BY AUTOMATED COUNT: 12.9 % (ref 11.5–14.5)
EST. GFR  (AFRICAN AMERICAN): >60 ML/MIN/1.73 M^2
EST. GFR  (NON AFRICAN AMERICAN): >60 ML/MIN/1.73 M^2
GLUCOSE SERPL-MCNC: 101 MG/DL (ref 70–110)
GLUCOSE UR QL STRIP: NEGATIVE
HCT VFR BLD AUTO: 40.5 % (ref 37–48.5)
HGB BLD-MCNC: 13.1 G/DL (ref 12–16)
HGB UR QL STRIP: NEGATIVE
IMM GRANULOCYTES # BLD AUTO: 0.03 K/UL (ref 0–0.04)
IMM GRANULOCYTES NFR BLD AUTO: 0.3 % (ref 0–0.5)
INFLUENZA A, MOLECULAR: NEGATIVE
INFLUENZA B, MOLECULAR: NEGATIVE
KETONES UR QL STRIP: NEGATIVE
LEUKOCYTE ESTERASE UR QL STRIP: NEGATIVE
LYMPHOCYTES # BLD AUTO: 1.2 K/UL (ref 1–4.8)
LYMPHOCYTES NFR BLD: 10.7 % (ref 18–48)
MCH RBC QN AUTO: 27.8 PG (ref 27–31)
MCHC RBC AUTO-ENTMCNC: 32.3 G/DL (ref 32–36)
MCV RBC AUTO: 86 FL (ref 82–98)
MONOCYTES # BLD AUTO: 0.5 K/UL (ref 0.3–1)
MONOCYTES NFR BLD: 4.4 % (ref 4–15)
NEUTROPHILS # BLD AUTO: 9.4 K/UL (ref 1.8–7.7)
NEUTROPHILS NFR BLD: 83.6 % (ref 38–73)
NITRITE UR QL STRIP: NEGATIVE
NRBC BLD-RTO: 0 /100 WBC
PH UR STRIP: 8 [PH] (ref 5–8)
PLATELET # BLD AUTO: 315 K/UL (ref 150–350)
PMV BLD AUTO: 10.6 FL (ref 9.2–12.9)
POTASSIUM SERPL-SCNC: 4.1 MMOL/L (ref 3.5–5.1)
PROT SERPL-MCNC: 8 G/DL (ref 6–8.4)
PROT UR QL STRIP: NEGATIVE
RBC # BLD AUTO: 4.71 M/UL (ref 4–5.4)
SODIUM SERPL-SCNC: 138 MMOL/L (ref 136–145)
SP GR UR STRIP: 1.01 (ref 1–1.03)
SPECIMEN SOURCE: NORMAL
URN SPEC COLLECT METH UR: NORMAL
UROBILINOGEN UR STRIP-ACNC: NEGATIVE EU/DL
WBC # BLD AUTO: 11.24 K/UL (ref 3.9–12.7)

## 2019-12-08 PROCEDURE — 99285 EMERGENCY DEPT VISIT HI MDM: CPT | Mod: 25

## 2019-12-08 PROCEDURE — 63600175 PHARM REV CODE 636 W HCPCS: Performed by: EMERGENCY MEDICINE

## 2019-12-08 PROCEDURE — 80053 COMPREHEN METABOLIC PANEL: CPT

## 2019-12-08 PROCEDURE — 87502 INFLUENZA DNA AMP PROBE: CPT

## 2019-12-08 PROCEDURE — 96365 THER/PROPH/DIAG IV INF INIT: CPT

## 2019-12-08 PROCEDURE — 85025 COMPLETE CBC W/AUTO DIFF WBC: CPT

## 2019-12-08 PROCEDURE — 81003 URINALYSIS AUTO W/O SCOPE: CPT

## 2019-12-08 RX ORDER — ACYCLOVIR 800 MG/1
800 TABLET ORAL
Qty: 35 TABLET | Refills: 0 | Status: SHIPPED | OUTPATIENT
Start: 2019-12-08 | End: 2020-10-30

## 2019-12-08 RX ORDER — AMOXICILLIN AND CLAVULANATE POTASSIUM 875; 125 MG/1; MG/1
1 TABLET, FILM COATED ORAL 2 TIMES DAILY
Qty: 6 TABLET | Refills: 0 | Status: SHIPPED | OUTPATIENT
Start: 2019-12-08 | End: 2020-10-30

## 2019-12-08 RX ADMIN — CEFTRIAXONE SODIUM 1 G: 1 INJECTION, POWDER, FOR SOLUTION INTRAMUSCULAR; INTRAVENOUS at 02:12

## 2019-12-08 NOTE — DISCHARGE INSTRUCTIONS
Continue Augmentin as directedUntil all gone  Please follow-up with the ENT as directed for further evaluation and definitive care  Acyclovir as directed   Return to the emergency department if he develops worsening symptoms, headaches, fevers, or any concerns

## 2019-12-08 NOTE — ED PROVIDER NOTES
Encounter Date: 12/8/2019       History     Chief Complaint   Patient presents with    Otalgia     64-year-old female presents emergency department presents with complaint of left ear pain she states she has been having for approximately 1 week she states she used the [facility] telemedicine line and was called in Augmentin which she has been taking for the last few days without relief of symptoms she reports that the pain started out as a sharp intense pain to her left ear and has migrated to the center of her head she then reports that a few days later she developed a bunch of blisters to her lower lip from the middle to the lateral left side.  Patient also complaining of feeling head congestion and a nonproductive cough.  She denies any associated fevers throughout this        Review of patient's allergies indicates:  No Known Allergies  Past Medical History:   Diagnosis Date    Mayaguez's disease     Breast cancer 07/2018    left breast    Hypertension      Past Surgical History:   Procedure Laterality Date    AXILLARY NODE DISSECTION Left 10/29/2018    Procedure: LYMPHADENECTOMY, AXILLARY;  Surgeon: Triston Avendaño MD;  Location: [facility] OR [address] [place] [address];  Service: General;  Laterality: Left;    BILATERAL MASTECTOMY Right 10/29/2018    Procedure: MASTECTOMY;  Surgeon: Triston Avendaño MD;  [facility];  Service: General;  Laterality: Right;    BREAST BIOPSY Left 07/2018    BREAST RECONSTRUCTION Bilateral 10/29/2018    Procedure: RECONSTRUCTION, BREAST;  Surgeon: Jatin Smith MD;  [facility];  Service: Plastics;  Laterality: Bilateral;    INJECTION FOR SENTINEL NODE IDENTIFICATION Left 10/29/2018    Procedure: INJECTION, FOR SENTINEL NODE IDENTIFICATION;  Surgeon: Triston Avendaño MD;  [facility];  Service: General;  Laterality: Left;    INSERTION OF BREAST IMPLANT Bilateral 10/29/2018    Procedure: INSERTION, BREAST IMPLANT;  Surgeon: Jatin COLEMAN  MD Sarah;  Location: [address];  Service: Plastics;  Laterality: Bilateral;    INSERTION OF TUNNELED CENTRAL VENOUS CATHETER (CVC) WITH SUBCUTANEOUS PORT Right 10/29/2018    Procedure: FHDEELWRL-GFLK-M-CATH with flouro;  Surgeon: Triston Avendaño MD;  Location: [address];  Service: General;  Laterality: Right;    MODIFIED RADICAL MASTECTOMY W/ AXILLARY LYMPH NODE DISSECTION Left 10/29/2018    Procedure: MASTECTOMY, MODIFIED RADICAL;  Surgeon: Triston Avendaño MD;  Location: [address];  Service: General;  Laterality: Left;    SENTINEL LYMPH NODE BIOPSY Left 10/29/2018    Procedure: BIOPSY, LYMPH NODE, SENTINEL;  Surgeon: Triston Avendaño MD;  Location: [address];  Service: General;  Laterality: Left;    THYROID SURGERY      THYROIDECTOMY, PARTIAL       Family History   Problem Relation Age of Onset    Diabetes Mother     Asbestos Father     Cancer Father     Heart attack Father     Seizures Sister     Diabetes Brother     Heart failure Maternal Aunt     Colon cancer Maternal Uncle     Lung cancer Paternal Aunt     Lung cancer Paternal Uncle     Stroke Maternal Grandmother     Heart failure Maternal Grandmother     No Known Problems Maternal Grandfather     No Known Problems Paternal Grandmother     No Known Problems Paternal Grandfather     No Known Problems Sister     No Known Problems Sister      Social History     Tobacco Use    Smoking status: Never Smoker    Smokeless tobacco: Never Used   Substance Use Topics    Alcohol use: No     Frequency: Never    Drug use: No     Review of Systems   Constitutional: Negative.    HENT: Positive for ear pain.    Eyes: Negative.    Respiratory: Positive for cough.    Cardiovascular: Negative.    Gastrointestinal: Negative.    Endocrine: Negative.    Genitourinary: Negative.    Musculoskeletal: Negative.    Skin: Negative.    Allergic/Immunologic: Negative.    Neurological: Positive for headaches.   Hematological:  Negative.    Psychiatric/Behavioral: Negative.    All other systems reviewed and are negative.      Physical Exam     Initial Vitals [12/08/19 0922]   BP Pulse Resp Temp SpO2   (!) 148/79 83 17 97.3 °F (36.3 °C) 100 %      MAP       --         Physical Exam    Nursing note and vitals reviewed.  Constitutional: She appears well-developed and well-nourished.   HENT:   Head: Normocephalic and atraumatic.   Left Ear: There is tenderness. There is mastoid tenderness. Tympanic membrane is erythematous.   Mouth/Throat: Uvula is midline, oropharynx is clear and moist and mucous membranes are normal.   Patient has multiple vesicular lesions to the middle of the lower lip to the left lateral area of the lip.   Eyes: Conjunctivae and EOM are normal. Pupils are equal, round, and reactive to light.   Neck: Normal range of motion. Neck supple.   Cardiovascular: Normal rate, regular rhythm, normal heart sounds and intact distal pulses.   Abdominal: Soft. Bowel sounds are normal.   Musculoskeletal: Normal range of motion.   Neurological: She is alert and oriented to person, place, and time. She has normal strength and normal reflexes.   Skin: Skin is warm and dry.   Psychiatric: She has a normal mood and affect.         ED Course- 64-year-old well-appearing female presents to the emergency department with multiple complaints including nasal congestion nonproductive cough left ear pain that started off as a burning sharp pain radiating to the left side of her face in the left side of her head she reports that she was evaluated by a telemedicine person and placed on Augmentin for 7 days she states approximately 3 days after starting the Augmentin she developed some blister type lesions to the left side of her lower lip consistent mostly with herpes zoster.  Patient has a no erythema noted to her mastoid area she has minimal tenderness and headache therefore CT imaging was performed she does have some mild opacification of the mastoid  consistent with mild mastoiditis she has no fevers her white count is normal I will treat her as an outpatient at this time she will receive IV Rocephin I will continue her Augmentin for 3 more days so she will have a 10 day dose and she will be instructed to follow up with an ENT she was given detailed return precautions the patient will also be started on acyclovir   Procedures  Labs Reviewed   CBC W/ AUTO DIFFERENTIAL - Abnormal; Notable for the following components:       Result Value    Gran # (ANC) 9.4 (*)     Gran% 83.6 (*)     Lymph% 10.7 (*)     All other components within normal limits   COMPREHENSIVE METABOLIC PANEL   URINALYSIS   INFLUENZA A AND B ANTIGEN          Imaging Results          X-Ray Chest PA And Lateral (Final result)  Result time 12/08/19 10:42:58    Final result by Donald Mitchell MD (12/08/19 10:42:58)                 Impression:      No acute pulmonary process.      Electronically signed by: Donald Mitchell MD  Date:    12/08/2019  Time:    10:42             Narrative:    EXAMINATION:  XR CHEST PA AND LATERAL    CLINICAL HISTORY:  Cough    COMPARISON:  10/30/2018    FINDINGS:  Right subclavian Port-A-Cath is in place with tip overlying the mid SVC.  Cardiac silhouette size is within normal limits.  No confluent airspace disease.  No pleural fluid or pneumothorax.  No acute osseous abnormality.  Left axillary surgical clips.                               CT Head Without Contrast (Final result)  Result time 12/08/19 10:33:49    Final result by Donald Mitchell MD (12/08/19 10:33:49)                 Impression:      No CT evidence of acute intracranial pathology.    Fluid within left mastoid air cells and left middle ear, concerning for left otomastoiditis in the appropriate clinical setting.    Pansinusitis.      Electronically signed by: Donald Mitchell MD  Date:    12/08/2019  Time:    10:33             Narrative:    EXAMINATION:  CT HEAD WITHOUT CONTRAST    CLINICAL HISTORY:  left ear  pain headache;    TECHNIQUE:  CMS Mandated Quality Data-CT Radiation Dose-436    All CT scans at this facility dose modulation, iterative reconstruction, and or weight-based dosing when appropriate to reduce radiation dose to as low as reasonably achievable.    COMPARISON:  None    FINDINGS:  Negative for acute intracranial hemorrhage, midline shift, or mass effect.  Ventricles and sulci are normal in size.  Gray-white differentiation is maintained.  Small rounded hypodensities involving left basal ganglia could reflect prominent perivascular spaces or sequela of remote lacunar infarctions.  Cerebellar hemispheres and brainstem are unremarkable.    Hyperostosis frontalis.  No calvarial lesion or fracture.  Fluid opacification of left-sided mastoid air cells and left middle ear.  Right mastoid air cells are clear.  Near complete mucosal opacification of paranasal sinuses, noting partial aeration of left maxillary sinus, sphenoid sinuses, and frontal sinuses.  The                                                                 Clinical Impression:       ICD-10-CM ICD-9-CM   1. Herpes zoster without complication B02.9 053.9   2. Cough R05 786.2   3. Acute mucoid otitis media of left ear H65.112 381.02   4. Mastoiditis, acute, left H70.002 383.00                             YVONNE Pino  12/08/19 1310

## 2020-02-26 NOTE — OP NOTE
DATE OF PROCEDURE:  10/29/2018    PREOPERATIVE DIAGNOSIS:  Breast cancer.    POSTOPERATIVE DIAGNOSIS:  Breast cancer.    PROCEDURES PERFORMED:  1.  Immediate bilateral breast reconstruction using implants.  2.  Placement of AlloDerm, right breast.  3.  Placement of AlloDerm, left breast.    SURGEON:  Jatin Smith M.D., [facility]    ANESTHESIA:  General.    BLOOD LOSS:  100 mL.    COMPLICATIONS:  None.    PROCEDURE IN DETAIL:  After completion of the mastectomy, I entered the room.    The mastectomy flaps were evaluated and noted to be thick and viable.    Hemostasis was then meticulously performed.  Next, the weight of the mastectomy   specimen was approximately 1300 g on each side.  An 800 mL temporary sizer was   used.  That was noted to be a little bit snug.  We elected then to use 700 mL   implants.  The patient had been redraped and reprepped.  Next, two sheets of 20   x 16 perforated AlloDerm were opened on the back table, soaked in triple   antibiotic solution.  Two 700-mL CSX smooth silicone implants were opened on the   back table, soaked in triple antibiotic solution.  The implants were then   wrapped in the AlloDerm.  The AlloDerm was secured using 2-0 Vicryl.  The direct   implant AlloDerm implant complex was then sutured deep in the prepectoral   position using 2-0 PDS sutures circumferentially.  Approximately 12 to 15   sutures were used on each side to ensure that there would be no weight or   tension on the skin.  Two drains were then placed.  Laterally, the soft tissue   was then tacked to the lateral chest wall using 2-0 Vicryl.  The skin was then   closed using interrupted 3-0 Monocryl followed by a running 4-0 Monocryl   subcuticular suture.  A similar procedure was performed on the opposite side.    There were no complications.      CRB/HN  dd: 10/30/2018 17:37:18 (CDT)  td: 10/30/2018 18:43:18 (CDT)  Doc ID   #[id number]  Job ID #[id number]    CC:    mother/father

## 2020-07-24 ENCOUNTER — LAB VISIT (OUTPATIENT)
Dept: LAB | Facility: HOSPITAL | Age: 65
End: 2020-07-24
Attending: INTERNAL MEDICINE
Payer: COMMERCIAL

## 2020-07-24 ENCOUNTER — OFFICE VISIT (OUTPATIENT)
Dept: HEMATOLOGY/ONCOLOGY | Facility: CLINIC | Age: 65
End: 2020-07-24
Payer: COMMERCIAL

## 2020-07-24 VITALS
WEIGHT: 222.88 LBS | SYSTOLIC BLOOD PRESSURE: 170 MMHG | RESPIRATION RATE: 18 BRPM | HEIGHT: 66 IN | BODY MASS INDEX: 35.82 KG/M2 | HEART RATE: 58 BPM | DIASTOLIC BLOOD PRESSURE: 79 MMHG | TEMPERATURE: 97 F

## 2020-07-24 DIAGNOSIS — Z17.0 MALIGNANT NEOPLASM OF UPPER-INNER QUADRANT OF LEFT BREAST IN FEMALE, ESTROGEN RECEPTOR POSITIVE: ICD-10-CM

## 2020-07-24 DIAGNOSIS — C50.212 MALIGNANT NEOPLASM OF UPPER-INNER QUADRANT OF LEFT BREAST IN FEMALE, ESTROGEN RECEPTOR POSITIVE: Primary | ICD-10-CM

## 2020-07-24 DIAGNOSIS — C50.212 MALIGNANT NEOPLASM OF UPPER-INNER QUADRANT OF LEFT BREAST IN FEMALE, ESTROGEN RECEPTOR POSITIVE: ICD-10-CM

## 2020-07-24 DIAGNOSIS — Z17.0 MALIGNANT NEOPLASM OF UPPER-INNER QUADRANT OF LEFT BREAST IN FEMALE, ESTROGEN RECEPTOR POSITIVE: Primary | ICD-10-CM

## 2020-07-24 LAB
ALBUMIN SERPL BCP-MCNC: 4.4 G/DL (ref 3.5–5.2)
ALP SERPL-CCNC: 89 U/L (ref 55–135)
ALT SERPL W/O P-5'-P-CCNC: 23 U/L (ref 10–44)
ANION GAP SERPL CALC-SCNC: 8 MMOL/L (ref 8–16)
AST SERPL-CCNC: 24 U/L (ref 10–40)
BASOPHILS # BLD AUTO: 0.06 K/UL (ref 0–0.2)
BASOPHILS NFR BLD: 1.2 % (ref 0–1.9)
BILIRUB SERPL-MCNC: 0.3 MG/DL (ref 0.1–1)
BUN SERPL-MCNC: 16 MG/DL (ref 8–23)
CALCIUM SERPL-MCNC: 10.1 MG/DL (ref 8.7–10.5)
CHLORIDE SERPL-SCNC: 106 MMOL/L (ref 95–110)
CO2 SERPL-SCNC: 27 MMOL/L (ref 23–29)
CREAT SERPL-MCNC: 0.8 MG/DL (ref 0.5–1.4)
DIFFERENTIAL METHOD: ABNORMAL
EOSINOPHIL # BLD AUTO: 0.1 K/UL (ref 0–0.5)
EOSINOPHIL NFR BLD: 2.4 % (ref 0–8)
ERYTHROCYTE [DISTWIDTH] IN BLOOD BY AUTOMATED COUNT: 13.9 % (ref 11.5–14.5)
EST. GFR  (AFRICAN AMERICAN): >60 ML/MIN/1.73 M^2
EST. GFR  (NON AFRICAN AMERICAN): >60 ML/MIN/1.73 M^2
GLUCOSE SERPL-MCNC: 93 MG/DL (ref 70–110)
HCT VFR BLD AUTO: 45.3 % (ref 37–48.5)
HGB BLD-MCNC: 14 G/DL (ref 12–16)
IMM GRANULOCYTES # BLD AUTO: 0 K/UL (ref 0–0.04)
IMM GRANULOCYTES NFR BLD AUTO: 0 % (ref 0–0.5)
LYMPHOCYTES # BLD AUTO: 1.5 K/UL (ref 1–4.8)
LYMPHOCYTES NFR BLD: 29.4 % (ref 18–48)
MCH RBC QN AUTO: 27.8 PG (ref 27–31)
MCHC RBC AUTO-ENTMCNC: 30.9 G/DL (ref 32–36)
MCV RBC AUTO: 90 FL (ref 82–98)
MONOCYTES # BLD AUTO: 0.2 K/UL (ref 0.3–1)
MONOCYTES NFR BLD: 4.1 % (ref 4–15)
NEUTROPHILS # BLD AUTO: 3.2 K/UL (ref 1.8–7.7)
NEUTROPHILS NFR BLD: 62.9 % (ref 38–73)
NRBC BLD-RTO: 0 /100 WBC
PLATELET # BLD AUTO: 206 K/UL (ref 150–350)
PMV BLD AUTO: 11.1 FL (ref 9.2–12.9)
POTASSIUM SERPL-SCNC: 5.1 MMOL/L (ref 3.5–5.1)
PROT SERPL-MCNC: 8.1 G/DL (ref 6–8.4)
RBC # BLD AUTO: 5.03 M/UL (ref 4–5.4)
SODIUM SERPL-SCNC: 141 MMOL/L (ref 136–145)
WBC # BLD AUTO: 5.07 K/UL (ref 3.9–12.7)

## 2020-07-24 PROCEDURE — 99214 PR OFFICE/OUTPT VISIT, EST, LEVL IV, 30-39 MIN: ICD-10-PCS | Mod: S$GLB,,, | Performed by: INTERNAL MEDICINE

## 2020-07-24 PROCEDURE — 99999 PR PBB SHADOW E&M-EST. PATIENT-LVL III: CPT | Mod: PBBFAC,,, | Performed by: INTERNAL MEDICINE

## 2020-07-24 PROCEDURE — 99214 OFFICE O/P EST MOD 30 MIN: CPT | Mod: S$GLB,,, | Performed by: INTERNAL MEDICINE

## 2020-07-24 PROCEDURE — 85025 COMPLETE CBC W/AUTO DIFF WBC: CPT

## 2020-07-24 PROCEDURE — 99999 PR PBB SHADOW E&M-EST. PATIENT-LVL III: ICD-10-PCS | Mod: PBBFAC,,, | Performed by: INTERNAL MEDICINE

## 2020-07-24 PROCEDURE — 3008F BODY MASS INDEX DOCD: CPT | Mod: S$GLB,,, | Performed by: INTERNAL MEDICINE

## 2020-07-24 PROCEDURE — 36415 COLL VENOUS BLD VENIPUNCTURE: CPT

## 2020-07-24 PROCEDURE — 3008F PR BODY MASS INDEX (BMI) DOCUMENTED: ICD-10-PCS | Mod: S$GLB,,, | Performed by: INTERNAL MEDICINE

## 2020-07-24 PROCEDURE — 80053 COMPREHEN METABOLIC PANEL: CPT

## 2020-07-24 RX ORDER — CHOLECALCIFEROL (VITAMIN D3) 25 MCG
TABLET ORAL DAILY
COMMUNITY
End: 2020-10-30

## 2020-07-24 RX ORDER — IBUPROFEN 100 MG/5ML
1000 SUSPENSION, ORAL (FINAL DOSE FORM) ORAL DAILY
COMMUNITY
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
## 2020-10-29 ENCOUNTER — TELEPHONE (OUTPATIENT)
Dept: HEMATOLOGY/ONCOLOGY | Facility: CLINIC | Age: 65
End: 2020-10-29

## 2020-10-30 ENCOUNTER — HOSPITAL ENCOUNTER (OUTPATIENT)
Dept: RADIOLOGY | Facility: HOSPITAL | Age: 65
Discharge: HOME | End: 2020-10-30
Attending: INTERNAL MEDICINE
Payer: COMMERCIAL

## 2020-10-30 ENCOUNTER — OFFICE VISIT (OUTPATIENT)
Dept: HEMATOLOGY/ONCOLOGY | Facility: CLINIC | Age: 65
End: 2020-10-30
Payer: COMMERCIAL

## 2020-10-30 VITALS
HEART RATE: 50 BPM | OXYGEN SATURATION: 96 % | BODY MASS INDEX: 37.9 KG/M2 | WEIGHT: 227.5 LBS | DIASTOLIC BLOOD PRESSURE: 80 MMHG | RESPIRATION RATE: 18 BRPM | TEMPERATURE: 98 F | HEIGHT: 65 IN | SYSTOLIC BLOOD PRESSURE: 173 MMHG

## 2020-10-30 DIAGNOSIS — C50.212 MALIGNANT NEOPLASM OF UPPER-INNER QUADRANT OF LEFT BREAST IN FEMALE, ESTROGEN RECEPTOR POSITIVE: Primary | ICD-10-CM

## 2020-10-30 DIAGNOSIS — M25.512 LEFT SHOULDER PAIN, UNSPECIFIED CHRONICITY: ICD-10-CM

## 2020-10-30 DIAGNOSIS — Z17.0 MALIGNANT NEOPLASM OF UPPER-INNER QUADRANT OF LEFT BREAST IN FEMALE, ESTROGEN RECEPTOR POSITIVE: Primary | ICD-10-CM

## 2020-10-30 PROCEDURE — 3008F PR BODY MASS INDEX (BMI) DOCUMENTED: ICD-10-PCS | Mod: S$GLB,,, | Performed by: INTERNAL MEDICINE

## 2020-10-30 PROCEDURE — 73030 X-RAY EXAM OF SHOULDER: CPT | Mod: TC,LT

## 2020-10-30 PROCEDURE — 99999 PR PBB SHADOW E&M-EST. PATIENT-LVL IV: CPT | Mod: PBBFAC,,, | Performed by: INTERNAL MEDICINE

## 2020-10-30 PROCEDURE — 73030 X-RAY EXAM OF SHOULDER: CPT | Mod: 26,LT,, | Performed by: RADIOLOGY

## 2020-10-30 PROCEDURE — 3008F BODY MASS INDEX DOCD: CPT | Mod: S$GLB,,, | Performed by: INTERNAL MEDICINE

## 2020-10-30 PROCEDURE — 99214 OFFICE O/P EST MOD 30 MIN: CPT | Mod: S$GLB,,, | Performed by: INTERNAL MEDICINE

## 2020-10-30 PROCEDURE — 73030 XR SHOULDER COMPLETE 2 OR MORE VIEWS LEFT: ICD-10-PCS | Mod: 26,LT,, | Performed by: RADIOLOGY

## 2020-10-30 PROCEDURE — 99999 PR PBB SHADOW E&M-EST. PATIENT-LVL IV: ICD-10-PCS | Mod: PBBFAC,,, | Performed by: INTERNAL MEDICINE

## 2020-10-30 PROCEDURE — 99214 PR OFFICE/OUTPT VISIT, EST, LEVL IV, 30-39 MIN: ICD-10-PCS | Mod: S$GLB,,, | Performed by: INTERNAL MEDICINE

## 2020-10-30 RX ORDER — ACETAMINOPHEN 500 MG
TABLET ORAL
COMMUNITY
Start: 2019-01-01

## 2020-10-30 RX ORDER — ALPRAZOLAM 0.5 MG/1
TABLET ORAL
COMMUNITY
Start: 2020-07-31

## 2020-10-30 NOTE — PROGRESS NOTES
Subjective:       Patient ID: Beatriz Faust is a 65 y.o. female.    Chief Complaint: No chief complaint on file.    HPI     States overall doing well  On and off left shoulder pain noted   No other pain issues  Weight stable  No fevers, chills or infectious complaints     Note- we see intermittently as patient opted out of care for awhile  Last visit was the first we had seen her since 6/2019, prior to that 1/2019  She missed mammogram as well she states- however she is post bilateral mastectomies    After 2nd cycle desired no further chemotherapy and cancelled follow up appointments  She has not been seeing any medical doctors and self managing her care with lifestyle changes  Dietary changes- vegetable smoothies, decreased sugar intake, eating      Oncologic History:       Oncologic History See below    Oncologic Treatment Surgery to date    Pathology [id number] ductal      Oncology History:  Initially evaluated by medical oncology at an OSH and declined NA chemotherapy at that time  - no mammograms in > 15 years  - presented to outside ED in early July 2018 for abdominal pain and incidentally a breast mass was seen on CT  - at an OSH she underwent a MMG/US and core needle bx which came back as high grade invasive ductal carcinoma ER/AR/Her2 +.    - She was originally seen at [facility] where she was recommended to have neoadjuvant chemotherapy which she declined  - 9/4/18 Breast MRI:  Impression:  Left  Mass: Left breast 26 mm x 22 mm x 19 mm mass at the middle 5 o'clock position. Assessment: 6 - Known biopsy, proven malignancy.   Right  There is no MR evidence of malignancy.  BI-RADS Category:   Overall: 6 - Known Biopsy-Proven Malignancy  - 9/8/18 PET scan:  Findings:  Hypermetabolic 2.5 cm left breast mass with SUV max of 4.7.  No additional abnormal foci of increased tracer uptake.  Physiologic uptake of the tracer is present within the brain, salivary glands, myocardium, GI and  tracts.  Incidental CT  findings: 5 mm pulmonary nodule in the right lower lobe without corresponding hypermetabolic activity, and below the size threshold for PET sensitivity.  Impression:  Hypermetabolic 2.5 cm left breast mass compatible with history of invasive ductal carcinoma.             Malignant neoplasm of overlapping sites of left breast in female, estrogen receptor positive     7/20/2018 Biopsy       2.4 cm irregular solid mass at 5 o'clock correlating to the mammographic finding            7/20/2018 Initial Diagnosis       BREAST MASS, LEFT (NEEDLE BIOPSY):  Invasive ductal adenocarcinoma, grade 3  Tubule formation 3, nuclear pleomorphism 3, mitosis 2  Ren histologic score 8  Invasive tumor size: 1.2 centimeters  Ductal carcinoma in situ (DCIS, high nuclear grade            7/20/2018 Tumor Markers       Estrogen Receptor: Positive >90%  Progesterone Receptor: Positive 0-10%  HER2: Positive  Ki67: >30%            10/29/2018 Surgery       · Left breast mastectomy, pT2 pN1a  · Invasive ductal carcinoma, grade 3, 2.9 cm  · DCIS, grade 3, 0.1 cm  · Margins uninvolved, inv ca and DCIS are 5.6 cm from anterior  · 1/16 positive lymph nodes, largest focus is 0.3 cm, positive for extranodal extension   · Right breast mastectomy: Benign                10/29/2018 Cancer Staged       Cancer Staging  pT2 pN1a.  Stage IB per AJCC 8th ed. pathologic prognostic staging system               11/27/2018 Tumor Conference Documentation       Patient declined chemotherapy pre-op, however now with positive lymph node she is willing to discuss. Will need port placement and HER2 guided therapy.      Chances of cure with surgery alone is 30-40%. With chemotherapy, could reduce this by about half, improving her outcome by about 25%.      Would recommend Herceptin and Perjeta (if approved by her insurance) possibly followed by Neratinib. Neratinib may be less helpful in ER+ setting.       Radiation not indicated.       - completed 2 cycles of  adjuvant chemotherapy and declines further including her 2 fariba drugs     At last visit we offered Tamoxifen and she thought about and decided not to do    Review of Systems   Constitutional: Negative for activity change, appetite change, chills, fatigue, fever and unexpected weight change.   HENT: Negative for nasal congestion, dental problem, ear pain, hearing loss, mouth sores, nosebleeds and rhinorrhea.    Eyes: Negative for visual disturbance.   Respiratory: Negative for cough, chest tightness, shortness of breath and wheezing.    Cardiovascular: Negative for chest pain, palpitations and leg swelling.   Gastrointestinal: Negative for abdominal distention, abdominal pain, blood in stool, constipation, diarrhea, nausea and vomiting.   Genitourinary: Negative for decreased urine volume, difficulty urinating, dysuria, frequency and hematuria.   Musculoskeletal: Positive for arthralgias. Negative for back pain, gait problem, joint swelling and neck pain.   Integumentary:  Negative for pallor and rash.   Neurological: Negative for dizziness, weakness, light-headedness, numbness and headaches.   Hematological: Negative for adenopathy. Does not bruise/bleed easily.   Psychiatric/Behavioral: Negative for confusion, dysphoric mood and sleep disturbance.         Objective:      Physical Exam  Vitals signs and nursing note reviewed.   Constitutional:       General: She is not in acute distress.     Appearance: Normal appearance. She is well-developed. She is not diaphoretic.      Comments: Presents with her mom  ECOG=0   HENT:      Head: Normocephalic and atraumatic.   Eyes:      General: No scleral icterus.     Conjunctiva/sclera: Conjunctivae normal.      Pupils: Pupils are equal, round, and reactive to light.      Right eye: Pupil is round and reactive.      Left eye: Pupil is round and reactive.   Neck:      Musculoskeletal: Normal range of motion and neck supple.      Thyroid: No thyromegaly.   Cardiovascular:       Rate and Rhythm: Normal rate and regular rhythm.      Heart sounds: Normal heart sounds. No murmur. No friction rub. No gallop.    Pulmonary:      Effort: Pulmonary effort is normal. No respiratory distress.      Breath sounds: Normal breath sounds. No wheezing or rales.      Comments: Breasts- no masses  Scar between breast with pus drainage  Abdominal:      General: Bowel sounds are normal. There is no distension.      Palpations: Abdomen is soft. There is no mass.      Tenderness: There is no abdominal tenderness. There is no guarding or rebound.      Comments: No organomegaly   Musculoskeletal: Normal range of motion.         General: No tenderness or deformity.   Lymphadenopathy:      Head:      Right side of head: No submandibular adenopathy.      Left side of head: No submandibular adenopathy.      Cervical: No cervical adenopathy.      Right cervical: No superficial, deep or posterior cervical adenopathy.     Left cervical: No superficial, deep or posterior cervical adenopathy.      Upper Body:      Right upper body: No supraclavicular adenopathy.      Left upper body: No supraclavicular adenopathy.      Lower Body: No right inguinal adenopathy. No left inguinal adenopathy.   Skin:     General: Skin is warm and dry.      Coloration: Skin is not pale.      Findings: No bruising, erythema, lesion, petechiae or rash.   Neurological:      Mental Status: She is alert and oriented to person, place, and time.      Cranial Nerves: No cranial nerve deficit.      Sensory: No sensory deficit.      Coordination: Coordination normal.      Deep Tendon Reflexes: Reflexes are normal and symmetric.   Psychiatric:         Mood and Affect: Mood is not anxious or depressed.         Behavior: Behavior normal.         Thought Content: Thought content normal.         Judgment: Judgment normal.       Labs- reviewed  Assessment:       1. Malignant neoplasm of upper-inner quadrant of left breast in female, estrogen receptor positive     2. Left shoulder pain, unspecified chronicity        Plan:     1. JUAN clinically  RTC 3 months with labs  Next mammogram 7/2021  2. Xrays now    25 minutes total

## 2020-11-02 ENCOUNTER — TELEPHONE (OUTPATIENT)
Dept: HEMATOLOGY/ONCOLOGY | Facility: CLINIC | Age: 65
End: 2020-11-02

## 2020-11-02 NOTE — TELEPHONE ENCOUNTER
----- Message from Laura Delgado MD sent at 11/2/2020  3:29 PM CST -----  Called  ----- Message -----  From: Charis Craft  Sent: 11/2/2020   7:11 AM CST  To: Laura Delgado MD    Reminder to call  ----- Message -----  From: Laura Delgado MD  Sent: 10/30/2020   4:41 PM CST  To: Charis Craft    Have me call her with xray

## 2020-12-21 ENCOUNTER — PATIENT MESSAGE (OUTPATIENT)
Dept: HEMATOLOGY/ONCOLOGY | Facility: CLINIC | Age: 65
End: 2020-12-21

## 2021-02-07 ENCOUNTER — PATIENT MESSAGE (OUTPATIENT)
Dept: HEMATOLOGY/ONCOLOGY | Facility: CLINIC | Age: 66
End: 2021-02-07

## 2021-04-29 ENCOUNTER — PATIENT MESSAGE (OUTPATIENT)
Dept: RESEARCH | Facility: HOSPITAL | Age: 66
End: 2021-04-29

## 2021-06-03 NOTE — TELEPHONE ENCOUNTER
----- Message from Aj Conrad MD sent at 11/14/2019  1:14 PM CST -----  Call:  Tests look good.  Diabetes getting a little worse.   Eat fewer simple sugars (sugar, white bread, white pasta).  Replace with darker grains.  No new medicines for now.  6 month recheck.   Spoke with pt regarding surgery, pt advised to arrive to [facility] at 0730 for 0930 surgery, pt verbalized understanding, pre-op education reinforced, all questions answered at this time, pt given reassurance

## 2021-06-14 ENCOUNTER — LAB VISIT (OUTPATIENT)
Dept: LAB | Facility: HOSPITAL | Age: 66
End: 2021-06-14
Attending: INTERNAL MEDICINE
Payer: COMMERCIAL

## 2021-06-14 ENCOUNTER — OFFICE VISIT (OUTPATIENT)
Dept: HEMATOLOGY/ONCOLOGY | Facility: CLINIC | Age: 66
End: 2021-06-14
Payer: COMMERCIAL

## 2021-06-14 ENCOUNTER — INFUSION (OUTPATIENT)
Dept: INFUSION THERAPY | Facility: HOSPITAL | Age: 66
End: 2021-06-14
Payer: COMMERCIAL

## 2021-06-14 VITALS
HEART RATE: 51 BPM | RESPIRATION RATE: 16 BRPM | TEMPERATURE: 98 F | HEIGHT: 65 IN | OXYGEN SATURATION: 98 % | BODY MASS INDEX: 39.85 KG/M2 | DIASTOLIC BLOOD PRESSURE: 74 MMHG | WEIGHT: 239.19 LBS | SYSTOLIC BLOOD PRESSURE: 160 MMHG

## 2021-06-14 DIAGNOSIS — L65.9 HAIR LOSS: ICD-10-CM

## 2021-06-14 DIAGNOSIS — Z17.0 MALIGNANT NEOPLASM OF UPPER-INNER QUADRANT OF LEFT BREAST IN FEMALE, ESTROGEN RECEPTOR POSITIVE: Primary | ICD-10-CM

## 2021-06-14 DIAGNOSIS — C50.212 MALIGNANT NEOPLASM OF UPPER-INNER QUADRANT OF LEFT BREAST IN FEMALE, ESTROGEN RECEPTOR POSITIVE: ICD-10-CM

## 2021-06-14 DIAGNOSIS — C50.919 BREAST CANCER: Primary | ICD-10-CM

## 2021-06-14 DIAGNOSIS — C50.212 MALIGNANT NEOPLASM OF UPPER-INNER QUADRANT OF LEFT BREAST IN FEMALE, ESTROGEN RECEPTOR POSITIVE: Primary | ICD-10-CM

## 2021-06-14 DIAGNOSIS — Z17.0 MALIGNANT NEOPLASM OF UPPER-INNER QUADRANT OF LEFT BREAST IN FEMALE, ESTROGEN RECEPTOR POSITIVE: ICD-10-CM

## 2021-06-14 LAB
ALBUMIN SERPL BCP-MCNC: 3.9 G/DL (ref 3.5–5.2)
ALP SERPL-CCNC: 67 U/L (ref 55–135)
ALT SERPL W/O P-5'-P-CCNC: 20 U/L (ref 10–44)
ANION GAP SERPL CALC-SCNC: 8 MMOL/L (ref 8–16)
AST SERPL-CCNC: 23 U/L (ref 10–40)
BASOPHILS # BLD AUTO: 0.06 K/UL (ref 0–0.2)
BASOPHILS NFR BLD: 1.1 % (ref 0–1.9)
BILIRUB SERPL-MCNC: 0.5 MG/DL (ref 0.1–1)
BUN SERPL-MCNC: 17 MG/DL (ref 8–23)
CALCIUM SERPL-MCNC: 9.6 MG/DL (ref 8.7–10.5)
CHLORIDE SERPL-SCNC: 108 MMOL/L (ref 95–110)
CO2 SERPL-SCNC: 26 MMOL/L (ref 23–29)
CREAT SERPL-MCNC: 0.8 MG/DL (ref 0.5–1.4)
DIFFERENTIAL METHOD: NORMAL
EOSINOPHIL # BLD AUTO: 0.1 K/UL (ref 0–0.5)
EOSINOPHIL NFR BLD: 1.4 % (ref 0–8)
ERYTHROCYTE [DISTWIDTH] IN BLOOD BY AUTOMATED COUNT: 13.7 % (ref 11.5–14.5)
EST. GFR  (AFRICAN AMERICAN): >60 ML/MIN/1.73 M^2
EST. GFR  (NON AFRICAN AMERICAN): >60 ML/MIN/1.73 M^2
GLUCOSE SERPL-MCNC: 101 MG/DL (ref 70–110)
HCT VFR BLD AUTO: 41.1 % (ref 37–48.5)
HGB BLD-MCNC: 13.2 G/DL (ref 12–16)
IMM GRANULOCYTES # BLD AUTO: 0.01 K/UL (ref 0–0.04)
IMM GRANULOCYTES NFR BLD AUTO: 0.2 % (ref 0–0.5)
LYMPHOCYTES # BLD AUTO: 1.6 K/UL (ref 1–4.8)
LYMPHOCYTES NFR BLD: 28.5 % (ref 18–48)
MCH RBC QN AUTO: 28.3 PG (ref 27–31)
MCHC RBC AUTO-ENTMCNC: 32.1 G/DL (ref 32–36)
MCV RBC AUTO: 88 FL (ref 82–98)
MONOCYTES # BLD AUTO: 0.3 K/UL (ref 0.3–1)
MONOCYTES NFR BLD: 5.8 % (ref 4–15)
NEUTROPHILS # BLD AUTO: 3.6 K/UL (ref 1.8–7.7)
NEUTROPHILS NFR BLD: 63 % (ref 38–73)
NRBC BLD-RTO: 0 /100 WBC
PLATELET # BLD AUTO: 220 K/UL (ref 150–450)
PMV BLD AUTO: 10.9 FL (ref 9.2–12.9)
POTASSIUM SERPL-SCNC: 4.5 MMOL/L (ref 3.5–5.1)
PROT SERPL-MCNC: 7.3 G/DL (ref 6–8.4)
RBC # BLD AUTO: 4.66 M/UL (ref 4–5.4)
SODIUM SERPL-SCNC: 142 MMOL/L (ref 136–145)
WBC # BLD AUTO: 5.65 K/UL (ref 3.9–12.7)

## 2021-06-14 PROCEDURE — 80053 COMPREHEN METABOLIC PANEL: CPT | Performed by: INTERNAL MEDICINE

## 2021-06-14 PROCEDURE — 1126F AMNT PAIN NOTED NONE PRSNT: CPT | Mod: S$GLB,,, | Performed by: NURSE PRACTITIONER

## 2021-06-14 PROCEDURE — 85025 COMPLETE CBC W/AUTO DIFF WBC: CPT | Performed by: INTERNAL MEDICINE

## 2021-06-14 PROCEDURE — 99214 PR OFFICE/OUTPT VISIT, EST, LEVL IV, 30-39 MIN: ICD-10-PCS | Mod: S$GLB,,, | Performed by: NURSE PRACTITIONER

## 2021-06-14 PROCEDURE — 1126F PR PAIN SEVERITY QUANTIFIED, NO PAIN PRESENT: ICD-10-PCS | Mod: S$GLB,,, | Performed by: NURSE PRACTITIONER

## 2021-06-14 PROCEDURE — 25000003 PHARM REV CODE 250: Performed by: NURSE PRACTITIONER

## 2021-06-14 PROCEDURE — 99214 OFFICE O/P EST MOD 30 MIN: CPT | Mod: S$GLB,,, | Performed by: NURSE PRACTITIONER

## 2021-06-14 PROCEDURE — 63600175 PHARM REV CODE 636 W HCPCS: Performed by: NURSE PRACTITIONER

## 2021-06-14 PROCEDURE — 96523 IRRIG DRUG DELIVERY DEVICE: CPT

## 2021-06-14 PROCEDURE — 36415 COLL VENOUS BLD VENIPUNCTURE: CPT | Performed by: INTERNAL MEDICINE

## 2021-06-14 PROCEDURE — 3008F PR BODY MASS INDEX (BMI) DOCUMENTED: ICD-10-PCS | Mod: S$GLB,,, | Performed by: NURSE PRACTITIONER

## 2021-06-14 PROCEDURE — A4216 STERILE WATER/SALINE, 10 ML: HCPCS | Performed by: NURSE PRACTITIONER

## 2021-06-14 PROCEDURE — 99999 PR PBB SHADOW E&M-EST. PATIENT-LVL IV: ICD-10-PCS | Mod: PBBFAC,,, | Performed by: NURSE PRACTITIONER

## 2021-06-14 PROCEDURE — 99999 PR PBB SHADOW E&M-EST. PATIENT-LVL IV: CPT | Mod: PBBFAC,,, | Performed by: NURSE PRACTITIONER

## 2021-06-14 PROCEDURE — 3008F BODY MASS INDEX DOCD: CPT | Mod: S$GLB,,, | Performed by: NURSE PRACTITIONER

## 2021-06-14 RX ORDER — ALBUTEROL SULFATE 90 UG/1
2 AEROSOL, METERED RESPIRATORY (INHALATION)
COMMUNITY
Start: 2021-02-17

## 2021-06-14 RX ORDER — HEPARIN 100 UNIT/ML
500 SYRINGE INTRAVENOUS
Status: DISCONTINUED | OUTPATIENT
Start: 2021-06-14 | End: 2021-06-14 | Stop reason: HOSPADM

## 2021-06-14 RX ORDER — HEPARIN 100 UNIT/ML
500 SYRINGE INTRAVENOUS
Status: CANCELLED | OUTPATIENT
Start: 2021-06-14

## 2021-06-14 RX ORDER — ONDANSETRON 4 MG/1
TABLET, ORALLY DISINTEGRATING ORAL
COMMUNITY
Start: 2021-01-25

## 2021-06-14 RX ORDER — SODIUM CHLORIDE 0.9 % (FLUSH) 0.9 %
10 SYRINGE (ML) INJECTION
Status: DISCONTINUED | OUTPATIENT
Start: 2021-06-14 | End: 2021-06-14 | Stop reason: HOSPADM

## 2021-06-14 RX ORDER — SODIUM CHLORIDE 0.9 % (FLUSH) 0.9 %
10 SYRINGE (ML) INJECTION
Status: CANCELLED | OUTPATIENT
Start: 2021-06-14

## 2021-06-14 RX ADMIN — SODIUM CHLORIDE, PRESERVATIVE FREE 10 ML: 5 INJECTION INTRAVENOUS at 11:06

## 2021-06-14 RX ADMIN — HEPARIN 500 UNITS: 100 SYRINGE at 11:06

## 2021-06-21 ENCOUNTER — IMMUNIZATION (OUTPATIENT)
Dept: PRIMARY CARE CLINIC | Facility: CLINIC | Age: 66
End: 2021-06-21
Payer: COMMERCIAL

## 2021-06-21 ENCOUNTER — PATIENT MESSAGE (OUTPATIENT)
Dept: PLASTIC SURGERY | Facility: CLINIC | Age: 66
End: 2021-06-21

## 2021-06-21 DIAGNOSIS — Z23 NEED FOR VACCINATION: Primary | ICD-10-CM

## 2021-06-21 PROCEDURE — 91303 COVID-19,VECTOR-NR,RS-AD26,PF,0.5 ML DOSE VACCINE (JANSSEN): ICD-10-PCS | Mod: S$GLB,,, | Performed by: FAMILY MEDICINE

## 2021-06-21 PROCEDURE — 0031A COVID-19,VECTOR-NR,RS-AD26,PF,0.5 ML DOSE VACCINE (JANSSEN): ICD-10-PCS | Mod: CV19,S$GLB,, | Performed by: FAMILY MEDICINE

## 2021-06-21 PROCEDURE — 0031A COVID-19,VECTOR-NR,RS-AD26,PF,0.5 ML DOSE VACCINE (JANSSEN): CPT | Mod: CV19,S$GLB,, | Performed by: FAMILY MEDICINE

## 2021-06-21 PROCEDURE — 91303 COVID-19,VECTOR-NR,RS-AD26,PF,0.5 ML DOSE VACCINE (JANSSEN): CPT | Mod: S$GLB,,, | Performed by: FAMILY MEDICINE

## 2021-06-22 ENCOUNTER — OFFICE VISIT (OUTPATIENT)
Dept: PLASTIC SURGERY | Facility: CLINIC | Age: 66
End: 2021-06-22
Payer: COMMERCIAL

## 2021-06-22 ENCOUNTER — LAB VISIT (OUTPATIENT)
Dept: LAB | Facility: HOSPITAL | Age: 66
End: 2021-06-22
Attending: DERMATOLOGY
Payer: COMMERCIAL

## 2021-06-22 ENCOUNTER — OFFICE VISIT (OUTPATIENT)
Dept: DERMATOLOGY | Facility: CLINIC | Age: 66
End: 2021-06-22
Payer: COMMERCIAL

## 2021-06-22 VITALS
BODY MASS INDEX: 39.85 KG/M2 | HEART RATE: 65 BPM | HEIGHT: 65 IN | SYSTOLIC BLOOD PRESSURE: 144 MMHG | DIASTOLIC BLOOD PRESSURE: 70 MMHG | WEIGHT: 239.19 LBS

## 2021-06-22 DIAGNOSIS — Z09 SURGERY FOLLOW-UP EXAMINATION: Primary | ICD-10-CM

## 2021-06-22 DIAGNOSIS — L68.0 HIRSUTISM: Primary | ICD-10-CM

## 2021-06-22 DIAGNOSIS — Z76.89 ENCOUNTER FOR SKIN CARE: ICD-10-CM

## 2021-06-22 DIAGNOSIS — L65.9 HAIR LOSS: ICD-10-CM

## 2021-06-22 LAB
FERRITIN SERPL-MCNC: 114 NG/ML (ref 20–300)
IRON SERPL-MCNC: 53 UG/DL (ref 30–160)
SATURATED IRON: 12 % (ref 20–50)
TOTAL IRON BINDING CAPACITY: 426 UG/DL (ref 250–450)
TRANSFERRIN SERPL-MCNC: 288 MG/DL (ref 200–375)
TSH SERPL DL<=0.005 MIU/L-ACNC: 1.54 UIU/ML (ref 0.4–4)
VIT B12 SERPL-MCNC: 872 PG/ML (ref 210–950)

## 2021-06-22 PROCEDURE — 99024 POSTOP FOLLOW-UP VISIT: CPT | Mod: S$GLB,,, | Performed by: SURGERY

## 2021-06-22 PROCEDURE — 82652 VIT D 1 25-DIHYDROXY: CPT | Performed by: DERMATOLOGY

## 2021-06-22 PROCEDURE — 99203 OFFICE O/P NEW LOW 30 MIN: CPT | Mod: 95,,, | Performed by: DERMATOLOGY

## 2021-06-22 PROCEDURE — 82607 VITAMIN B-12: CPT | Performed by: DERMATOLOGY

## 2021-06-22 PROCEDURE — 3008F PR BODY MASS INDEX (BMI) DOCUMENTED: ICD-10-PCS | Mod: S$GLB,,, | Performed by: SURGERY

## 2021-06-22 PROCEDURE — 84630 ASSAY OF ZINC: CPT | Performed by: DERMATOLOGY

## 2021-06-22 PROCEDURE — 99999 PR PBB SHADOW E&M-EST. PATIENT-LVL III: ICD-10-PCS | Mod: PBBFAC,,, | Performed by: SURGERY

## 2021-06-22 PROCEDURE — 83540 ASSAY OF IRON: CPT | Performed by: DERMATOLOGY

## 2021-06-22 PROCEDURE — 36415 COLL VENOUS BLD VENIPUNCTURE: CPT | Performed by: DERMATOLOGY

## 2021-06-22 PROCEDURE — 1126F PR PAIN SEVERITY QUANTIFIED, NO PAIN PRESENT: ICD-10-PCS | Mod: S$GLB,,, | Performed by: SURGERY

## 2021-06-22 PROCEDURE — 99203 PR OFFICE/OUTPT VISIT, NEW, LEVL III, 30-44 MIN: ICD-10-PCS | Mod: 95,,, | Performed by: DERMATOLOGY

## 2021-06-22 PROCEDURE — 84443 ASSAY THYROID STIM HORMONE: CPT | Performed by: DERMATOLOGY

## 2021-06-22 PROCEDURE — 82728 ASSAY OF FERRITIN: CPT | Performed by: DERMATOLOGY

## 2021-06-22 PROCEDURE — 99999 PR PBB SHADOW E&M-EST. PATIENT-LVL III: CPT | Mod: PBBFAC,,, | Performed by: SURGERY

## 2021-06-22 PROCEDURE — 1126F AMNT PAIN NOTED NONE PRSNT: CPT | Mod: S$GLB,,, | Performed by: SURGERY

## 2021-06-22 PROCEDURE — 3008F BODY MASS INDEX DOCD: CPT | Mod: S$GLB,,, | Performed by: SURGERY

## 2021-06-22 PROCEDURE — 99024 PR POST-OP FOLLOW-UP VISIT: ICD-10-PCS | Mod: S$GLB,,, | Performed by: SURGERY

## 2021-06-25 LAB
1,25(OH)2D3 SERPL-MCNC: 84 PG/ML (ref 20–79)
ZINC SERPL-MCNC: 58 UG/DL (ref 60–130)

## 2021-06-28 ENCOUNTER — TELEPHONE (OUTPATIENT)
Dept: DERMATOLOGY | Facility: CLINIC | Age: 66
End: 2021-06-28

## 2021-08-03 NOTE — OP NOTE
DATE OF PROCEDURE:  10/29/2018    PRIMARY SURGEON:  Triston Avendaño M.D.    ASSISTANT SURGEON:  Carissa Lofton M.D. (Res)    PREOPERATIVE DIAGNOSIS:  Invasive infiltrating carcinoma of the left breast.    POSTOPERATIVE DIAGNOSIS:  Invasive infiltrating carcinoma of the left breast.    PROCEDURES PERFORMED:  Bilateral total complete mastectomies with total complete   therapeutic mastectomy on the left and total complete prophylactic mastectomy   on the right; injection of left breast with technetium labeled radiocolloid and   isosulfan Lymphazurin blue dye for sentinel lymph node mapping and   identification; identification and mapping of the left deep axillary sentinel   lymph nodes; left deep axillary sentinel lymph node biopsy x3; completion left   axillary lymph node dissection of levels 1 and 2 in essence making the left side   procedure a left modified radical mastectomy after intraoperative finding of   positive left axillary sentinel node.    ANESTHESIA:  General anesthesia.    PROCEDURE IN DETAIL:  The patient underwent informed consent.  The history and   physical examination was reviewed and updated.  Both sites were marked.  The   left side was marked for the sentinel lymph node biopsy.  Both sides were marked   for mastectomy.  The patient underwent a regional paravertebral block by the   Regional Anesthesia team in the holding area.  She was brought to the Operating   Room under general anesthesia.  The patient was in a supine position.  The left   anterior subareolar region was injected with the technetium-labeled radiocolloid   and isosulfan Lymphazurin blue dye for sentinel lymph node mapping and   identification.  This was allowed to migrate to the left axilla while we prepped   and draped the bilateral breasts, anterior chest, left arm and axilla in a   sterile fashion.  We initiated the procedure on the left side as we noted a hot   spot in the left side almost immediately.  Transverse  Let patient know his PCP is out this week.  If he needs prescription this week it should come from originating provider.  Susan Harper MD     PDMP Review       Value Time User    State PDMP site checked  Yes 8/3/2021  3:08 PM Susan Ferguson MD           elliptical incisions were   marked bilaterally for skin sparing, but non-nipple-sparing mastectomy.  The   transverse ellipse measured approximately 20 cm wide x 10 cm from superior to   inferior to excise the nipple areolar complex just above the superior areolar   margin so that the superior mastectomy skin flap could be brought down to the   inframammary fold by the Plastic Surgery team and Dr. Smith for anticipated   immediate implant technique reconstruction.  The ellipse was made again   superiorly just above the nipple areolar complex and inferiorly at the   inframammary fold.  The superior skin mastectomy flap was raised cephalad to the   clavicle, medially to the midline to the lateral border of the sternum and   inferiorly to the inframammary fold.  The lateral dissection was carried   laterally to identify the anterior border of latissimus dorsi muscle, the   serratus anterior muscle and the lateral border of the pectoralis musculature.    The superior dissection was carried up and over the upper outer quadrant   axillary tail of Sauceda breast tissue along the superior lateral margin of the   left pectoralis muscle.  We opened the clavipectoral fascia, identified a deep   level 1 sentinel lymph node, which was hot and blue with an ex vivo count of   10,000.  Just posterior to this, there were two other nodes labeled as warm and   blue, one was warm and blue with an ex vivo count of 4000 and the other one was   warm and blue with an ex vivo count of 2000.  Once these 3 lymph nodes were   removed, the background residual radioactivity counts were essentially   negligible indicating adequate and appropriate sentinel lymph node mapping and   biopsy.  There were no further palpable nodes and no further blue dye staining   nodes were noted.  Intraoperative frozen section on these lymph nodes revealed   findings consistent for positive metastatic adenocarcinoma of the breast to the   left axillary sentinel  lymph node.  We therefore completed an axillary lymph   node dissection after we had removed the left breast off of the chest wall by   taking it from a medial to lateral direction taking the pectoralis fascia off of   the muscle, taking it to the level of the sentinel lymph node biopsy.  The   breast had been removed and labeled with a short stitch superiorly and a long   stitch laterally.  The axillary contents on the left were submitted separately.    We dissected superiorly to the axillary vein.  We preserved the adventitia of   the axillary vein.  We carried our dissection medially behind the pectoralis   minor muscle, sweeping all the lymphatic tissue out from behind the level 2   region behind the pectoralis minor muscle.  This was swept laterally, inferiorly   and posteriorly, preserving the medial pectoral nerve on the posterolateral   pectoralis musculature taking the blood vessels including the lateral pectoral   vessel and anterior axillary vein, which coursed anterior, parallel and   superficial to the thoracodorsal neurovascular bundle.  We identified the main   intercostal brachial nerve traversing the axilla, running parallel and inferior   to the main axillary vein.  This was preserved.  Our dissection was carried   posteriorly down to the subscapularis muscle, identifying the long thoracic   nerve of Mendez on the posterior medial chest wall and the thoracodorsal   neurovascular bundle on the posterior lateral aspect of the axilla.  All the   axillary contents were swept inferiorly off of the latissimus dorsi muscle and   serratus anterior muscle thus completing the level 1 and level 2 axillary   dissection.  There were no suspicious palpable nodes in the level 3 region.  The   axillary contents from the left side were submitted separately as left axillary   contents.  Hemostasis was achieved.  The skin flaps were uniform in thickness   approximately 6 to 7 mm, leaving only skin and subcutaneous  tissue, having   performed the dissection entirely outside of the superficial investing fascial   layer of the breast throughout the procedure.  All needle, instrument and sponge   counts were correct.  Normal saline moistened lap pads were placed on the left   side.    Attention was then turned to the contralateral right side for prophylactic   mastectomy.  The similar incision was made taking the transverse ellipse just   above the nipple areolar complex superiorly preserving most of the superior   breast skin and taking it inferiorly at the level of the inframammary fold so   that the superior breast skin could be brought down to the inframammary fold for   the implant technique reconstruction.  The incision was made sharply.  The   superior flap was carried cephalad to the clavicle, medially to the midline to   the lateral border of sternum, inferiorly to the inframammary fold, identifying   the fascia of the rectus abdominis muscle.  Laterally, the dissection was   carried to the anterior border of latissimus dorsi muscle preserving the   intercostal lateral thoracic nerves.  The serratus anterior was identified.  The   superior lateral dissection was carried up and over the upper outer quadrant   axillary tail of Sauceda breast tissue to the superior lateral border of the   pectoralis major muscle.  The clavipectoral fascia was identified, but not   opened.  The breast was removed from a medial to lateral direction taking the   pectoralis fascia off of the pectoralis muscle and removing it to the level of   the clavipectoral fascia superolaterally.  The breast was oriented with a short   stitch superiorly and a long stitch laterally as had been the contralateral   breast and was submitted to pathology for permanent sectioning.  Again, the   prophylactic mastectomy skin, mastectomy flaps were uniform in thickness   approximately 6 to 7 mm, leaving only skin and subcutaneous tissue, having   performed the  dissection outside of the superficial investing fascial layer of   the breast.  With hemostasis achieved on the right side, this side was also   packed with a normal saline moistened lap pad and the case was then turned over   to the Plastic Surgery team and Dr. Smith for anticipated implant technique   reconstruction.  She tolerated this portion of the procedure well without   complication.  Estimated blood loss had been minimal.  All needle, instrument   and sponge counts were correct.      TAYE/IN  dd: 11/03/2018 17:08:42 (CDT)  td: 11/03/2018 18:57:14 (CDT)  Doc ID   #[id number]  Job ID #[id number]    CC:

## 2021-09-03 ENCOUNTER — TELEPHONE (OUTPATIENT)
Dept: HEMATOLOGY/ONCOLOGY | Facility: CLINIC | Age: 66
End: 2021-09-03

## 2021-09-07 ENCOUNTER — TELEPHONE (OUTPATIENT)
Dept: HEMATOLOGY/ONCOLOGY | Facility: CLINIC | Age: 66
End: 2021-09-07

## 2021-09-08 ENCOUNTER — TELEPHONE (OUTPATIENT)
Dept: HEMATOLOGY/ONCOLOGY | Facility: CLINIC | Age: 66
End: 2021-09-08

## 2021-09-09 ENCOUNTER — LAB VISIT (OUTPATIENT)
Dept: LAB | Facility: HOSPITAL | Age: 66
End: 2021-09-09
Payer: COMMERCIAL

## 2021-09-09 ENCOUNTER — OFFICE VISIT (OUTPATIENT)
Dept: HEMATOLOGY/ONCOLOGY | Facility: CLINIC | Age: 66
End: 2021-09-09
Payer: COMMERCIAL

## 2021-09-09 DIAGNOSIS — C50.219 MALIGNANT NEOPLASM OF UPPER-INNER QUADRANT OF BREAST IN FEMALE, ESTROGEN RECEPTOR NEGATIVE, UNSPECIFIED LATERALITY: Primary | ICD-10-CM

## 2021-09-09 DIAGNOSIS — C50.212 MALIGNANT NEOPLASM OF UPPER-INNER QUADRANT OF LEFT BREAST IN FEMALE, ESTROGEN RECEPTOR POSITIVE: ICD-10-CM

## 2021-09-09 DIAGNOSIS — Z17.0 MALIGNANT NEOPLASM OF UPPER-INNER QUADRANT OF LEFT BREAST IN FEMALE, ESTROGEN RECEPTOR POSITIVE: ICD-10-CM

## 2021-09-09 DIAGNOSIS — Z17.1 MALIGNANT NEOPLASM OF UPPER-INNER QUADRANT OF BREAST IN FEMALE, ESTROGEN RECEPTOR NEGATIVE, UNSPECIFIED LATERALITY: Primary | ICD-10-CM

## 2021-09-09 LAB
ALBUMIN SERPL BCP-MCNC: 4 G/DL (ref 3.5–5.2)
ALP SERPL-CCNC: 78 U/L (ref 55–135)
ALT SERPL W/O P-5'-P-CCNC: 18 U/L (ref 10–44)
ANION GAP SERPL CALC-SCNC: 10 MMOL/L (ref 8–16)
AST SERPL-CCNC: 20 U/L (ref 10–40)
BILIRUB SERPL-MCNC: 0.5 MG/DL (ref 0.1–1)
BUN SERPL-MCNC: 16 MG/DL (ref 8–23)
CALCIUM SERPL-MCNC: 9.6 MG/DL (ref 8.7–10.5)
CHLORIDE SERPL-SCNC: 107 MMOL/L (ref 95–110)
CO2 SERPL-SCNC: 23 MMOL/L (ref 23–29)
CREAT SERPL-MCNC: 0.8 MG/DL (ref 0.5–1.4)
ERYTHROCYTE [DISTWIDTH] IN BLOOD BY AUTOMATED COUNT: 14.6 % (ref 11.5–14.5)
EST. GFR  (AFRICAN AMERICAN): >60 ML/MIN/1.73 M^2
EST. GFR  (NON AFRICAN AMERICAN): >60 ML/MIN/1.73 M^2
GLUCOSE SERPL-MCNC: 86 MG/DL (ref 70–110)
HCT VFR BLD AUTO: 39.9 % (ref 37–48.5)
HGB BLD-MCNC: 12.9 G/DL (ref 12–16)
IMM GRANULOCYTES # BLD AUTO: 0.01 K/UL (ref 0–0.04)
MCH RBC QN AUTO: 27.7 PG (ref 27–31)
MCHC RBC AUTO-ENTMCNC: 32.3 G/DL (ref 32–36)
MCV RBC AUTO: 86 FL (ref 82–98)
NEUTROPHILS # BLD AUTO: 4 K/UL (ref 1.8–7.7)
PLATELET # BLD AUTO: 244 K/UL (ref 150–450)
PMV BLD AUTO: 11 FL (ref 9.2–12.9)
POTASSIUM SERPL-SCNC: 4.7 MMOL/L (ref 3.5–5.1)
PROT SERPL-MCNC: 7.5 G/DL (ref 6–8.4)
RBC # BLD AUTO: 4.66 M/UL (ref 4–5.4)
SODIUM SERPL-SCNC: 140 MMOL/L (ref 136–145)
WBC # BLD AUTO: 5.66 K/UL (ref 3.9–12.7)

## 2021-09-09 PROCEDURE — 85027 COMPLETE CBC AUTOMATED: CPT | Performed by: NURSE PRACTITIONER

## 2021-09-09 PROCEDURE — 99214 PR OFFICE/OUTPT VISIT, EST, LEVL IV, 30-39 MIN: ICD-10-PCS | Mod: S$GLB,,, | Performed by: INTERNAL MEDICINE

## 2021-09-09 PROCEDURE — 99999 PR PBB SHADOW E&M-EST. PATIENT-LVL I: CPT | Mod: PBBFAC,,, | Performed by: INTERNAL MEDICINE

## 2021-09-09 PROCEDURE — 80053 COMPREHEN METABOLIC PANEL: CPT | Performed by: NURSE PRACTITIONER

## 2021-09-09 PROCEDURE — 99999 PR PBB SHADOW E&M-EST. PATIENT-LVL I: ICD-10-PCS | Mod: PBBFAC,,, | Performed by: INTERNAL MEDICINE

## 2021-09-09 PROCEDURE — 36415 COLL VENOUS BLD VENIPUNCTURE: CPT | Performed by: NURSE PRACTITIONER

## 2021-09-09 PROCEDURE — 1160F RVW MEDS BY RX/DR IN RCRD: CPT | Mod: S$GLB,,, | Performed by: INTERNAL MEDICINE

## 2021-09-09 PROCEDURE — 1159F MED LIST DOCD IN RCRD: CPT | Mod: S$GLB,,, | Performed by: INTERNAL MEDICINE

## 2021-09-09 PROCEDURE — 1159F PR MEDICATION LIST DOCUMENTED IN MEDICAL RECORD: ICD-10-PCS | Mod: S$GLB,,, | Performed by: INTERNAL MEDICINE

## 2021-09-09 PROCEDURE — 1160F PR REVIEW ALL MEDS BY PRESCRIBER/CLIN PHARMACIST DOCUMENTED: ICD-10-PCS | Mod: S$GLB,,, | Performed by: INTERNAL MEDICINE

## 2021-09-09 PROCEDURE — 99214 OFFICE O/P EST MOD 30 MIN: CPT | Mod: S$GLB,,, | Performed by: INTERNAL MEDICINE

## 2022-02-14 ENCOUNTER — HOSPITAL ENCOUNTER (EMERGENCY)
Facility: HOSPITAL | Age: 67
Discharge: HOME | End: 2022-02-15
Attending: EMERGENCY MEDICINE
Payer: COMMERCIAL

## 2022-02-14 DIAGNOSIS — S16.1XXA STRAIN OF NECK MUSCLE, INITIAL ENCOUNTER: Primary | ICD-10-CM
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
## 2022-02-14 DIAGNOSIS — M25.511 ACUTE PAIN OF RIGHT SHOULDER: ICD-10-CM

## 2022-02-14 DIAGNOSIS — R07.9 CHEST PAIN: ICD-10-CM

## 2022-02-14 PROCEDURE — 93010 ELECTROCARDIOGRAM REPORT: CPT | Mod: ,,, | Performed by: SPECIALIST

## 2022-02-14 PROCEDURE — 93005 ELECTROCARDIOGRAM TRACING: CPT | Performed by: SPECIALIST

## 2022-02-14 PROCEDURE — 99284 EMERGENCY DEPT VISIT MOD MDM: CPT | Mod: 25

## 2022-02-14 PROCEDURE — 93010 EKG 12-LEAD: ICD-10-PCS | Mod: ,,, | Performed by: SPECIALIST

## 2022-02-15 VITALS
SYSTOLIC BLOOD PRESSURE: 170 MMHG | OXYGEN SATURATION: 100 % | DIASTOLIC BLOOD PRESSURE: 74 MMHG | HEART RATE: 44 BPM | WEIGHT: 222 LBS | BODY MASS INDEX: 35.68 KG/M2 | HEIGHT: 66 IN | TEMPERATURE: 98 F | RESPIRATION RATE: 18 BRPM

## 2022-02-15 RX ORDER — METHOCARBAMOL 500 MG/1
1000 TABLET, FILM COATED ORAL 4 TIMES DAILY
Qty: 40 TABLET | Refills: 0 | Status: SHIPPED | OUTPATIENT
Start: 2022-02-15 | End: 2022-02-20
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
## 2022-02-15 NOTE — DISCHARGE INSTRUCTIONS
Tylenol and Motrin for pain.  Please follow-up with your doctor in the next few days.  Return as needed
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
## 2022-03-14 ENCOUNTER — TELEPHONE (OUTPATIENT)
Dept: HEMATOLOGY/ONCOLOGY | Facility: CLINIC | Age: 67
End: 2022-03-14
Payer: COMMERCIAL

## 2022-06-24 ENCOUNTER — PATIENT MESSAGE (OUTPATIENT)
Dept: HEMATOLOGY/ONCOLOGY | Facility: CLINIC | Age: 67
End: 2022-06-24
Payer: COMMERCIAL

## 2022-09-26 NOTE — LETTER
December 7, 2018      Laura Delgado MD  [address] [facility] [address]           [facility]  [address]  [facility] [address]  Phone: [phone]          Patient: Beatriz Faust   MR Number: [id number]   YOB: 1955   Date of Visit: 12/7/2018       Dear Dr. Laura Delgado:    Thank you for referring Beatriz Faust to me for evaluation. Attached you will find relevant portions of my assessment and plan of care.    If you have questions, please do not hesitate to call me. I look forward to following Beatriz Faust along with you.    Sincerely,    Milena Garcia MD    Enclosure  CC:  No Recipients    If you would like to receive this communication electronically, please contact [email or web address] or [phone] to request more information on [employer] access.    For providers and/or their staff who would like to refer a patient to [facility], please contact us through our one-stop-shop provider referral line, [facility] , at [phone].    If you feel you have received this communication in error or would no longer like to receive these types of communications, please e-mail [email or web address]         
no

## 2023-10-29 ENCOUNTER — HOSPITAL ENCOUNTER (EMERGENCY)
Facility: HOSPITAL | Age: 68
Discharge: HOME | End: 2023-10-29
Attending: EMERGENCY MEDICINE

## 2023-10-29 VITALS
OXYGEN SATURATION: 99 % | DIASTOLIC BLOOD PRESSURE: 92 MMHG | HEIGHT: 66 IN | BODY MASS INDEX: 37.77 KG/M2 | RESPIRATION RATE: 20 BRPM | HEART RATE: 87 BPM | SYSTOLIC BLOOD PRESSURE: 180 MMHG | WEIGHT: 235 LBS | TEMPERATURE: 100 F

## 2023-10-29 DIAGNOSIS — H92.03 OTALGIA OF BOTH EARS: Primary | ICD-10-CM

## 2023-10-29 PROCEDURE — 99283 EMERGENCY DEPT VISIT LOW MDM: CPT

## 2023-10-29 RX ORDER — AMOXICILLIN 875 MG/1
875 TABLET, FILM COATED ORAL 2 TIMES DAILY
Qty: 14 TABLET | Refills: 0 | Status: SHIPPED | OUTPATIENT
Start: 2023-10-29 | End: 2023-11-05

## 2023-10-30 NOTE — ED PROVIDER NOTES
Chief complaint:  Otalgia (Patient complains of bilateral ear pain )      HPI:  Beatriz Faust is a 68 y.o. female with hx htn, prior breast CA presenting with largely resolved cough and subjective fever last week with persistent congestion marked by 4 days of bilateral, worsening ear pain.  She denies drainage.  She is been using over-the-counter drops with inadequate relief along with occasional over-the-counter NSAID.  She denies change in hearing, pain to external ear, spontaneous here drainage, tinnitus.    ROS: As per HPI and below:  No difficulty breathing, chest pain, facial pain.  No recorded fever this week.    Review of patient's allergies indicates:  No Known Allergies    Patient's Medications   New Prescriptions    AMOXICILLIN (AMOXIL) 875 MG TABLET    Take 1 tablet (875 mg total) by mouth 2 (two) times daily. for 7 days   Previous Medications    ALBUTEROL (PROVENTIL/VENTOLIN HFA) 90 MCG/ACTUATION INHALER    Inhale 2 puffs into the lungs every 4 to 6 hours as needed.    ALPRAZOLAM (XANAX) 0.5 MG TABLET    TAKE 1 TO 2 TABLETS BY MOUTH 30 MINUTES PRIOR TO PROCEDURE    ASCORBIC ACID, VITAMIN C, (VITAMIN C) 1000 MG TABLET    Take 1,000 mg by mouth once daily.    B COMPLEX VITAMINS CAPSULE    Take 1 capsule by mouth once daily.    CHOLECALCIFEROL, VITAMIN D3, (VITAMIN D3) 125 MCG (5,000 UNIT) TAB        CO-ENZYME Q-10 30 MG CAPSULE    Take 30 mg by mouth 3 (three) times daily.    GREEN TEA EXTRACT ORAL    Take by mouth.    ONDANSETRON (ZOFRAN-ODT) 4 MG TBDL       Modified Medications    No medications on file   Discontinued Medications    No medications on file       PMH:  As per HPI and below:  Past Medical History:   Diagnosis Date    [place]'s disease     Breast cancer 07/2018    left breast    Hypertension      Past Surgical History:   Procedure Laterality Date    AXILLARY NODE DISSECTION Left 10/29/2018    Procedure: LYMPHADENECTOMY, AXILLARY;  Surgeon: Triston Avendaño MD;  Location: [facility] OR [address] [place] [address];   Service: General;  Laterality: Left;    BILATERAL MASTECTOMY Right 10/29/2018    Procedure: MASTECTOMY;  Surgeon: Triston Avendaño MD;  Location: [facility] OR [address] [place] [address];  Service: General;  Laterality: Right;    BREAST BIOPSY Left 07/2018    BREAST RECONSTRUCTION Bilateral 10/29/2018    Procedure: RECONSTRUCTION, BREAST;  Surgeon: Jatin Smith MD;  Location: [facility] OR [address] [place] [address];  Service: Plastics;  Laterality: Bilateral;    INJECTION FOR SENTINEL NODE IDENTIFICATION Left 10/29/2018    Procedure: INJECTION, FOR SENTINEL NODE IDENTIFICATION;  Surgeon: Triston Avendaño MD;  Location: [facility] OR [address] [place] [address];  Service: General;  Laterality: Left;    INSERTION OF BREAST IMPLANT Bilateral 10/29/2018    Procedure: INSERTION, BREAST IMPLANT;  Surgeon: Jatin Smith MD;  Location: [facility] OR [address] [place] [address];  Service: Plastics;  Laterality: Bilateral;    INSERTION OF TUNNELED CENTRAL VENOUS CATHETER (CVC) WITH SUBCUTANEOUS PORT Right 10/29/2018    Procedure: [id number] with flouro;  Surgeon: Triston Avendaño MD;  Location: [facility] OR [address] [place] [address];  Service: General;  Laterality: Right;    MODIFIED RADICAL MASTECTOMY W/ AXILLARY LYMPH NODE DISSECTION Left 10/29/2018    Procedure: MASTECTOMY, MODIFIED RADICAL;  Surgeon: Triston Avendaño MD;  Location: [facility] OR [address] [place] [address];  Service: General;  Laterality: Left;    SENTINEL LYMPH NODE BIOPSY Left 10/29/2018    Procedure: BIOPSY, LYMPH NODE, SENTINEL;  Surgeon: Triston Avendaño MD;  Location: [facility] OR [address] [place] [address];  Service: General;  Laterality: Left;    THYROID SURGERY      THYROIDECTOMY, PARTIAL         Social History     Socioeconomic History    Marital status: Single   Tobacco Use    Smoking status: Never    Smokeless tobacco: Never   Substance and Sexual Activity    Alcohol use: No    Drug use: No    Sexual activity: Never       Family History   Problem Relation Age of Onset    Diabetes Mother     Asbestos Father     Cancer Father     Heart attack Father     Seizures Sister      Diabetes Brother     Heart failure Maternal Aunt     Colon cancer Maternal Uncle     Lung cancer Paternal Aunt     Lung cancer Paternal Uncle     Stroke Maternal Grandmother     Heart failure Maternal Grandmother     No Known Problems Maternal Grandfather     No Known Problems Paternal Grandmother     No Known Problems Paternal Grandfather     No Known Problems Sister     No Known Problems Sister        Physical Exam:    Vitals:    10/29/23 1813   BP: (!) 180/92   Pulse: 87   Resp: 20   Temp: 99.9 °F (37.7 °C)     GENERAL:  No apparent distress.  Alert.  Large body habitus.  HEENT:  Moist mucous membranes.  Normocephalic and atraumatic.  Normal external ear and canal with no erythema, edema, or pain to manipulation.  Minimal erythema to TM without retraction, left greater than right.  No perforation evident.  Effusion noted to both ears.  No mastoid tenderness to palpation or erythema.  NECK:  No swelling.  Midline trachea.  No cervical lymphadenopathy palpated.  No stridor.  CARDIOVASCULAR:  Regular rate and rhythm.  2+ radial pulses.    PULMONARY:  Lungs clear to auscultation bilaterally.  No wheezes, rales, or rhonci.  Unlabored respirations.  EXTREMITIES:  Warm and well perfused.  Brisk capillary refill.    NEUROLOGICAL:  Normal mental status.  Appropriate and conversant.    SKIN:  No rashes or ecchymoses.      Labs Reviewed - No data to display    Current Discharge Medication List        START taking these medications    Details   amoxicillin (AMOXIL) 875 MG tablet Take 1 tablet (875 mg total) by mouth 2 (two) times daily. for 7 days  Qty: 14 tablet, Refills: 0           CONTINUE these medications which have NOT CHANGED    Details   albuterol (PROVENTIL/VENTOLIN HFA) 90 mcg/actuation inhaler Inhale 2 puffs into the lungs every 4 to 6 hours as needed.      ALPRAZolam (XANAX) 0.5 MG tablet TAKE 1 TO 2 TABLETS BY MOUTH 30 MINUTES PRIOR TO PROCEDURE      ascorbic acid, vitamin C, (VITAMIN C) 1000 MG tablet Take  1,000 mg by mouth once daily.      b complex vitamins capsule Take 1 capsule by mouth once daily.      cholecalciferol, vitamin D3, (VITAMIN D3) 125 mcg (5,000 unit) Tab       co-enzyme Q-10 30 mg capsule Take 30 mg by mouth 3 (three) times daily.      GREEN TEA EXTRACT ORAL Take by mouth.      ondansetron (ZOFRAN-ODT) 4 MG TbDL              No orders of the defined types were placed in this encounter.      Imaging Results    None              MDM:    68 y.o. female with bilateral otalgia in the setting of preceding upper respiratory symptoms most consistent with otitis media with effusion.  I did discuss watchful waiting approach to antibiotics with symptomatic treatment as she has been doing.  She is to follow up with PCP.  Prescription for antibiotics given if she fails to improve.  Very low suspicion for deep space infection such as mastoiditis.  Lungs are clear and I doubt pneumonia.  I did make it clear that this could easily be viral or rather simply from fluid in the middle ear rather than bacterial ear infection and itself and that even in cases of bacterial ear infection, the rate of recovery is often similar with or without antibiotics.  Return precautions reviewed.    Diagnoses:    1. Bilateral otalgia       Kevon Dukes MD  10/29/23 1919

## 2024-01-04 ENCOUNTER — LAB VISIT (OUTPATIENT)
Dept: LAB | Facility: HOSPITAL | Age: 69
End: 2024-01-04
Attending: INTERNAL MEDICINE
Payer: MEDICARE

## 2024-01-04 ENCOUNTER — OFFICE VISIT (OUTPATIENT)
Dept: HEMATOLOGY/ONCOLOGY | Facility: CLINIC | Age: 69
End: 2024-01-04
Payer: MEDICARE

## 2024-01-04 ENCOUNTER — TELEPHONE (OUTPATIENT)
Dept: HEMATOLOGY/ONCOLOGY | Facility: CLINIC | Age: 69
End: 2024-01-04
Payer: MEDICARE

## 2024-01-04 VITALS
DIASTOLIC BLOOD PRESSURE: 84 MMHG | TEMPERATURE: 97 F | WEIGHT: 241.88 LBS | OXYGEN SATURATION: 98 % | SYSTOLIC BLOOD PRESSURE: 171 MMHG | HEART RATE: 64 BPM | RESPIRATION RATE: 17 BRPM | HEIGHT: 66 IN | BODY MASS INDEX: 38.87 KG/M2

## 2024-01-04 DIAGNOSIS — Z17.1 MALIGNANT NEOPLASM OF UPPER-INNER QUADRANT OF BREAST IN FEMALE, ESTROGEN RECEPTOR NEGATIVE, UNSPECIFIED LATERALITY: Primary | ICD-10-CM

## 2024-01-04 DIAGNOSIS — Z17.1 MALIGNANT NEOPLASM OF UPPER-INNER QUADRANT OF BREAST IN FEMALE, ESTROGEN RECEPTOR NEGATIVE, UNSPECIFIED LATERALITY: ICD-10-CM

## 2024-01-04 DIAGNOSIS — C50.219 MALIGNANT NEOPLASM OF UPPER-INNER QUADRANT OF BREAST IN FEMALE, ESTROGEN RECEPTOR NEGATIVE, UNSPECIFIED LATERALITY: Primary | ICD-10-CM

## 2024-01-04 DIAGNOSIS — C50.219 MALIGNANT NEOPLASM OF UPPER-INNER QUADRANT OF BREAST IN FEMALE, ESTROGEN RECEPTOR NEGATIVE, UNSPECIFIED LATERALITY: ICD-10-CM

## 2024-01-04 LAB
ALBUMIN SERPL BCP-MCNC: 3.9 G/DL (ref 3.5–5.2)
ALP SERPL-CCNC: 78 U/L (ref 55–135)
ALT SERPL W/O P-5'-P-CCNC: 20 U/L (ref 10–44)
ANION GAP SERPL CALC-SCNC: 9 MMOL/L (ref 8–16)
AST SERPL-CCNC: 21 U/L (ref 10–40)
BASOPHILS # BLD AUTO: 0.05 K/UL (ref 0–0.2)
BASOPHILS NFR BLD: 0.9 % (ref 0–1.9)
BILIRUB SERPL-MCNC: 0.5 MG/DL (ref 0.1–1)
BUN SERPL-MCNC: 21 MG/DL (ref 8–23)
CALCIUM SERPL-MCNC: 9.7 MG/DL (ref 8.7–10.5)
CHLORIDE SERPL-SCNC: 107 MMOL/L (ref 95–110)
CO2 SERPL-SCNC: 27 MMOL/L (ref 23–29)
CREAT SERPL-MCNC: 0.7 MG/DL (ref 0.5–1.4)
DIFFERENTIAL METHOD BLD: ABNORMAL
EOSINOPHIL # BLD AUTO: 0.1 K/UL (ref 0–0.5)
EOSINOPHIL NFR BLD: 1.4 % (ref 0–8)
ERYTHROCYTE [DISTWIDTH] IN BLOOD BY AUTOMATED COUNT: 14.4 % (ref 11.5–14.5)
EST. GFR  (NO RACE VARIABLE): >60 ML/MIN/1.73 M^2
GLUCOSE SERPL-MCNC: 98 MG/DL (ref 70–110)
HCT VFR BLD AUTO: 44.3 % (ref 37–48.5)
HGB BLD-MCNC: 14.1 G/DL (ref 12–16)
IMM GRANULOCYTES # BLD AUTO: 0.02 K/UL (ref 0–0.04)
IMM GRANULOCYTES NFR BLD AUTO: 0.3 % (ref 0–0.5)
LYMPHOCYTES # BLD AUTO: 1.2 K/UL (ref 1–4.8)
LYMPHOCYTES NFR BLD: 21 % (ref 18–48)
MCH RBC QN AUTO: 27.3 PG (ref 27–31)
MCHC RBC AUTO-ENTMCNC: 31.8 G/DL (ref 32–36)
MCV RBC AUTO: 86 FL (ref 82–98)
MONOCYTES # BLD AUTO: 0.3 K/UL (ref 0.3–1)
MONOCYTES NFR BLD: 5.5 % (ref 4–15)
NEUTROPHILS # BLD AUTO: 4.2 K/UL (ref 1.8–7.7)
NEUTROPHILS NFR BLD: 70.9 % (ref 38–73)
NRBC BLD-RTO: 0 /100 WBC
PLATELET # BLD AUTO: 280 K/UL (ref 150–450)
PMV BLD AUTO: 10.5 FL (ref 9.2–12.9)
POTASSIUM SERPL-SCNC: 4.6 MMOL/L (ref 3.5–5.1)
PROT SERPL-MCNC: 7.9 G/DL (ref 6–8.4)
RBC # BLD AUTO: 5.16 M/UL (ref 4–5.4)
SODIUM SERPL-SCNC: 143 MMOL/L (ref 136–145)
WBC # BLD AUTO: 5.86 K/UL (ref 3.9–12.7)

## 2024-01-04 PROCEDURE — 85025 COMPLETE CBC W/AUTO DIFF WBC: CPT | Performed by: INTERNAL MEDICINE

## 2024-01-04 PROCEDURE — 36415 COLL VENOUS BLD VENIPUNCTURE: CPT | Performed by: INTERNAL MEDICINE

## 2024-01-04 PROCEDURE — 99213 OFFICE O/P EST LOW 20 MIN: CPT | Mod: S$GLB,,, | Performed by: INTERNAL MEDICINE

## 2024-01-04 PROCEDURE — 99999 PR PBB SHADOW E&M-EST. PATIENT-LVL IV: CPT | Mod: PBBFAC,,, | Performed by: INTERNAL MEDICINE

## 2024-01-04 PROCEDURE — 80053 COMPREHEN METABOLIC PANEL: CPT | Performed by: INTERNAL MEDICINE

## 2024-01-04 NOTE — PROGRESS NOTES
Subjective     Patient ID: Beatriz Faust is a 68 y.o. female.    Chief Complaint: Malignant neoplasm of upper-inner quadrant of breast in fem    HPI    Returns for follow up  Was running late as her mom has long Covid and they do not leave alone    She was without insurance for 10 months so missed her 5 year appointment  Personal stressors  Overall feeling good  Notes weight gain  States limited in activity now but did purchase a recumbent bike  Denies pain     Oncologic History:       Oncologic History See below    Oncologic Treatment Surgery to date    Pathology [id number] ductal      Oncology History:  Initially evaluated by medical oncology at an OSH and declined NA chemotherapy at that time  - no mammograms in > 15 years  - presented to outside ED in early July 2018 for abdominal pain and incidentally a breast mass was seen on CT  - at an OSH she underwent a MMG/US and core needle bx which came back as high grade invasive ductal carcinoma ER/WA/Her2 +.    - She was originally seen at [facility] where she was recommended to have neoadjuvant chemotherapy which she declined  - 9/4/18 Breast MRI:  Impression:  Left  Mass: Left breast 26 mm x 22 mm x 19 mm mass at the middle 5 o'clock position. Assessment: 6 - Known biopsy, proven malignancy.   Right  There is no MR evidence of malignancy.  BI-RADS Category:   Overall: 6 - Known Biopsy-Proven Malignancy  - 9/8/18 PET scan:  Findings:  Hypermetabolic 2.5 cm left breast mass with SUV max of 4.7.  No additional abnormal foci of increased tracer uptake.  Physiologic uptake of the tracer is present within the brain, salivary glands, myocardium, GI and  tracts.  Incidental CT findings: 5 mm pulmonary nodule in the right lower lobe without corresponding hypermetabolic activity, and below the size threshold for PET sensitivity.  Impression:  Hypermetabolic 2.5 cm left breast mass compatible with history of invasive ductal carcinoma.             Malignant neoplasm of  overlapping sites of left breast in female, estrogen receptor positive     7/20/2018 Biopsy       2.4 cm irregular solid mass at 5 o'clock correlating to the mammographic finding            7/20/2018 Initial Diagnosis       BREAST MASS, LEFT (NEEDLE BIOPSY):  Invasive ductal adenocarcinoma, grade 3  Tubule formation 3, nuclear pleomorphism 3, mitosis 2  Ren histologic score 8  Invasive tumor size: 1.2 centimeters  Ductal carcinoma in situ (DCIS, high nuclear grade            7/20/2018 Tumor Markers       Estrogen Receptor: Positive >90%  Progesterone Receptor: Positive 0-10%  HER2: Positive  Ki67: >30%            10/29/2018 Surgery       Left breast mastectomy, pT2 pN1a  Invasive ductal carcinoma, grade 3, 2.9 cm  DCIS, grade 3, 0.1 cm  Margins uninvolved, inv ca and DCIS are 5.6 cm from anterior  1/16 positive lymph nodes, largest focus is 0.3 cm, positive for extranodal extension   Right breast mastectomy: Benign                10/29/2018 Cancer Staged       Cancer Staging  pT2 pN1a.  Stage IB per AJCC 8th ed. pathologic prognostic staging system               11/27/2018 Tumor Conference Documentation       Patient declined chemotherapy pre-op, however now with positive lymph node she is willing to discuss. Will need port placement and HER2 guided therapy.      Chances of cure with surgery alone is 30-40%. With chemotherapy, could reduce this by about half, improving her outcome by about 25%.      Would recommend Herceptin and Perjeta (if approved by her insurance) possibly followed by Neratinib. Neratinib may be less helpful in ER+ setting.       Radiation not indicated.       - completed 2 cycles of adjuvant chemotherapy and declines further including her 2 fariba drugs     At last visit we offered Tamoxifen and she thought about and decided not to do    Review of Systems   Constitutional:  Negative for activity change, appetite change, chills, fatigue, fever and unexpected weight change.   HENT:  Negative  for nasal congestion, dental problem, ear pain, hearing loss, mouth sores, nosebleeds and rhinorrhea.    Eyes:  Negative for visual disturbance.   Respiratory:  Negative for cough, chest tightness, shortness of breath and wheezing.    Cardiovascular:  Negative for chest pain, palpitations and leg swelling.   Gastrointestinal:  Positive for constipation. Negative for abdominal distention, abdominal pain, blood in stool, diarrhea, nausea and vomiting.   Genitourinary:  Negative for decreased urine volume, difficulty urinating, dysuria, frequency and hematuria.   Musculoskeletal:  Negative for arthralgias, back pain, gait problem, joint swelling and neck pain.   Integumentary:  Negative for pallor and rash.   Neurological:  Negative for dizziness, weakness, light-headedness, numbness and headaches.   Hematological:  Negative for adenopathy. Does not bruise/bleed easily.   Psychiatric/Behavioral:  Negative for confusion, dysphoric mood and sleep disturbance.           Objective     Physical Exam  Vitals and nursing note reviewed.   Constitutional:       General: She is not in acute distress.     Appearance: Normal appearance. She is well-developed. She is obese. She is not ill-appearing.      Comments: Presents with her mom  ECOG=0   HENT:      Head: Normocephalic and atraumatic.   Eyes:      General: No scleral icterus.     Extraocular Movements: Extraocular movements intact.      Conjunctiva/sclera: Conjunctivae normal.      Pupils: Pupils are equal, round, and reactive to light.      Right eye: Pupil is round and reactive.      Left eye: Pupil is round and reactive.   Neck:      Thyroid: No thyromegaly.   Cardiovascular:      Rate and Rhythm: Normal rate and regular rhythm.      Heart sounds: Normal heart sounds. No murmur heard.     No friction rub. No gallop.   Pulmonary:      Effort: Pulmonary effort is normal. No respiratory distress.      Breath sounds: Normal breath sounds. No wheezing, rhonchi or rales.       Comments: Breasts reconstructed- no masses  Hypertrophic scars  Abdominal:      General: Bowel sounds are normal. There is no distension.      Palpations: Abdomen is soft. There is no mass.      Tenderness: There is no abdominal tenderness. There is no guarding or rebound.      Comments: No organomegaly   Musculoskeletal:         General: No tenderness or deformity. Normal range of motion.      Cervical back: Normal range of motion and neck supple.   Lymphadenopathy:      Head:      Right side of head: No submandibular adenopathy.      Left side of head: No submandibular adenopathy.      Cervical: No cervical adenopathy.      Right cervical: No superficial, deep or posterior cervical adenopathy.     Left cervical: No superficial, deep or posterior cervical adenopathy.      Upper Body:      Right upper body: No supraclavicular adenopathy.      Left upper body: No supraclavicular adenopathy.      Lower Body: No right inguinal adenopathy. No left inguinal adenopathy.   Skin:     General: Skin is warm and dry.      Coloration: Skin is not jaundiced or pale.      Findings: No bruising, erythema, lesion, petechiae or rash.   Neurological:      General: No focal deficit present.      Mental Status: She is alert and oriented to person, place, and time.      Cranial Nerves: No cranial nerve deficit.      Sensory: No sensory deficit.      Motor: No weakness.      Coordination: Coordination normal.      Deep Tendon Reflexes: Reflexes are normal and symmetric.   Psychiatric:         Mood and Affect: Mood normal. Mood is not anxious or depressed.         Behavior: Behavior normal.         Thought Content: Thought content normal.         Judgment: Judgment normal.          Assessment and Plan     1. Malignant neoplasm of upper-inner quadrant of breast in female, estrogen receptor negative, unspecified laterality  -     CBC W/ AUTO DIFFERENTIAL; Future; Expected date: 01/04/2024  -     CMP; Future; Expected date:  01/04/2024      Clinically JUAN  RTC 1 year  Desires port to stay-- needs flushes    Route Chart for Scheduling    Med Onc Chart Routing      Follow up with physician    Follow up with DEAN 1 year.   Infusion scheduling note    Injection scheduling note    Labs    Imaging    Pharmacy appointment    Other referrals            Treatment Plan Information   OP BREAST TRASTUZUMAB PERTUZUMAB DOCETAXEL CARBOPLATIN Q3W   Laura Delgado MD   Upcoming Treatment Dates - OP BREAST TRASTUZUMAB PERTUZUMAB DOCETAXEL CARBOPLATIN Q3W    2/7/2019       Chemotherapy       pertuzumab (PERJETA) 420 mg in sodium chloride 0.9% 250 mL infusion       trastuzumab in sodium chloride 0.9% chemo infusion       DOCEtaxel (TAXOTERE) in sodium chloride 0.9% 250 mL chemo infusion       CARBOplatin (PARAPLATIN) in sodium chloride 0.9% 500 mL chemo infusion       Antiemetics       palonosetron (ALOXI) 0.25 mg, dexamethasone (DECADRON) 20 mg in sodium chloride 0.9% 50 mL  2/28/2019       Chemotherapy       pertuzumab (PERJETA) 420 mg in sodium chloride 0.9% 250 mL infusion       trastuzumab in sodium chloride 0.9% chemo infusion       DOCEtaxel (TAXOTERE) in sodium chloride 0.9% 250 mL chemo infusion       CARBOplatin (PARAPLATIN) in sodium chloride 0.9% 500 mL chemo infusion       Antiemetics       palonosetron (ALOXI) 0.25 mg, dexamethasone (DECADRON) 20 mg in sodium chloride 0.9% 50 mL  3/21/2019       Chemotherapy       pertuzumab (PERJETA) 420 mg in sodium chloride 0.9% 250 mL infusion       trastuzumab in sodium chloride 0.9% chemo infusion       DOCEtaxel (TAXOTERE) in sodium chloride 0.9% 250 mL chemo infusion       CARBOplatin (PARAPLATIN) in sodium chloride 0.9% 500 mL chemo infusion       Antiemetics       palonosetron (ALOXI) 0.25 mg, dexamethasone (DECADRON) 20 mg in sodium chloride 0.9% 50 mL  4/11/2019       Chemotherapy       pertuzumab (PERJETA) 420 mg in sodium chloride 0.9% 250 mL infusion       trastuzumab in sodium chloride 0.9%  chemo infusion       DOCEtaxel (TAXOTERE) in sodium chloride 0.9% 250 mL chemo infusion       CARBOplatin (PARAPLATIN) in sodium chloride 0.9% 500 mL chemo infusion       Antiemetics       palonosetron (ALOXI) 0.25 mg, dexamethasone (DECADRON) 20 mg in sodium chloride 0.9% 50 mL

## 2024-01-04 NOTE — TELEPHONE ENCOUNTER
Called pt. She will be seen with time left of appt time as Dr. Delgado has a noon meeting.     ----- Message from Mckenna Real sent at 1/4/2024 11:16 AM CST -----  Regarding: Late  Contact: Pt @ [phone]  Pt is calling because she is running late, pt will be there at 11:45 am. Asking for a call back

## 2024-02-01 DIAGNOSIS — C50.219 MALIGNANT NEOPLASM OF UPPER-INNER QUADRANT OF BREAST IN FEMALE, ESTROGEN RECEPTOR NEGATIVE, UNSPECIFIED LATERALITY: Primary | ICD-10-CM

## 2024-02-01 DIAGNOSIS — Z17.1 MALIGNANT NEOPLASM OF UPPER-INNER QUADRANT OF BREAST IN FEMALE, ESTROGEN RECEPTOR NEGATIVE, UNSPECIFIED LATERALITY: Primary | ICD-10-CM

## 2024-08-28 ENCOUNTER — OFFICE VISIT (OUTPATIENT)
Dept: PULMONOLOGY | Facility: CLINIC | Age: 69
End: 2024-08-28
Payer: MEDICARE

## 2024-08-28 VITALS
HEIGHT: 66 IN | HEART RATE: 59 BPM | WEIGHT: 254 LBS | OXYGEN SATURATION: 98 % | DIASTOLIC BLOOD PRESSURE: 84 MMHG | BODY MASS INDEX: 40.82 KG/M2 | SYSTOLIC BLOOD PRESSURE: 154 MMHG

## 2024-08-28 DIAGNOSIS — J45.991 COUGH VARIANT ASTHMA: ICD-10-CM

## 2024-08-28 DIAGNOSIS — J32.4 CHRONIC PANSINUSITIS: ICD-10-CM

## 2024-08-28 DIAGNOSIS — R05.3 CHRONIC COUGH: Primary | ICD-10-CM

## 2024-08-28 PROCEDURE — 99999 PR PBB SHADOW E&M-EST. PATIENT-LVL III: CPT | Mod: PBBFAC,,, | Performed by: INTERNAL MEDICINE

## 2024-08-28 PROCEDURE — 1101F PT FALLS ASSESS-DOCD LE1/YR: CPT | Mod: CPTII,S$GLB,, | Performed by: INTERNAL MEDICINE

## 2024-08-28 PROCEDURE — 3077F SYST BP >= 140 MM HG: CPT | Mod: CPTII,S$GLB,, | Performed by: INTERNAL MEDICINE

## 2024-08-28 PROCEDURE — 3079F DIAST BP 80-89 MM HG: CPT | Mod: CPTII,S$GLB,, | Performed by: INTERNAL MEDICINE

## 2024-08-28 PROCEDURE — 3008F BODY MASS INDEX DOCD: CPT | Mod: CPTII,S$GLB,, | Performed by: INTERNAL MEDICINE

## 2024-08-28 PROCEDURE — 99204 OFFICE O/P NEW MOD 45 MIN: CPT | Mod: S$GLB,,, | Performed by: INTERNAL MEDICINE

## 2024-08-28 PROCEDURE — 1159F MED LIST DOCD IN RCRD: CPT | Mod: CPTII,S$GLB,, | Performed by: INTERNAL MEDICINE

## 2024-08-28 PROCEDURE — 3288F FALL RISK ASSESSMENT DOCD: CPT | Mod: CPTII,S$GLB,, | Performed by: INTERNAL MEDICINE

## 2024-08-28 RX ORDER — FLUTICASONE PROPIONATE 50 MCG
2 SPRAY, SUSPENSION (ML) NASAL 2 TIMES DAILY
COMMUNITY
Start: 2024-03-04

## 2024-08-28 RX ORDER — PREDNISONE 20 MG/1
TABLET ORAL
Qty: 12 TABLET | Refills: 0 | Status: SHIPPED | OUTPATIENT
Start: 2024-08-28

## 2024-08-28 RX ORDER — FLUTICASONE FUROATE, UMECLIDINIUM BROMIDE AND VILANTEROL TRIFENATATE 200; 62.5; 25 UG/1; UG/1; UG/1
1 POWDER RESPIRATORY (INHALATION) DAILY
Qty: 60 EACH | Refills: 11 | Status: SHIPPED | OUTPATIENT
Start: 2024-08-28

## 2024-08-28 RX ORDER — ALBUTEROL SULFATE 90 UG/1
2 INHALANT RESPIRATORY (INHALATION) EVERY 4 HOURS PRN
Qty: 18 G | Refills: 11 | Status: SHIPPED | OUTPATIENT
Start: 2024-08-28

## 2024-08-28 RX ORDER — TOBRAMYCIN 3 MG/ML
SOLUTION/ DROPS OPHTHALMIC
COMMUNITY
Start: 2024-05-18

## 2024-08-28 RX ORDER — AMLODIPINE BESYLATE 5 MG/1
5 TABLET ORAL NIGHTLY
COMMUNITY
Start: 2024-07-02

## 2024-08-28 NOTE — PATIENT INSTRUCTIONS
Chronic cough    Chronic sinuses with intermittent infections seem likely--  in 2019 er visit for ear pain ct head showed severe sinus infections    With wheeze, night time worsening, and associated with sinus infections -- expect cough variant asthma.   Use trelegy once daily,  use albuterol 2 puffs up to every 4 hrs as needed for cough, and use prednisone 20 mg daily for 3 days if still cough.  May consider special asthma testing    May consider ct sinuses as severe sinus infections seen ct head --     Reflux may contribute to cough?  Consider reflux with prilosec daily for 2 months if not clearing       May consider ct chest    Would recommend shingles vaccine once no steroids needed......         Follow up in 4-12 wks or so... later if clears....

## 2024-08-28 NOTE — PROGRESS NOTES
8/28/2024    Beatriz Faust  New Patient Consult    Chief Complaint   Patient presents with    Cough       HPI:    8/28/2024 pt had had persistent cough since Jan, had cxr ok. 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  occ wheeze   Has had ear infections -- onset this yr. No prior sinus problems-- but ct brain in 2019 hadpan sinusitis. Pet scan in 2018 had clear sinuses except for right maxillary cyst.   Cough comes from lungs.    Pt  was dosed abx for ear infection, was seen by Dr Thornton, ent.   Now on steroids  -- took steroids In feb and took steroids 2nd time       PFSH:  Past Medical History:   Diagnosis Date    Ana's disease     Breast cancer 07/2018    left breast    Hypertension          Past Surgical History:   Procedure Laterality Date    AXILLARY NODE DISSECTION Left 10/29/2018    Procedure: LYMPHADENECTOMY, AXILLARY;  Surgeon: Triston Avendaño MD;  Location: [facility] OR [address] [place] [address];  Service: General;  Laterality: Left;    BILATERAL MASTECTOMY Right 10/29/2018    Procedure: MASTECTOMY;  Surgeon: Triston Avendaño MD;  Location: [facility] OR [address] [place] [address];  Service: General;  Laterality: Right;    BREAST BIOPSY Left 07/2018    BREAST RECONSTRUCTION Bilateral 10/29/2018    Procedure: RECONSTRUCTION, BREAST;  Surgeon: Jatin Smith MD;  Location: [facility] OR [address] [place] [address];  Service: Plastics;  Laterality: Bilateral;    INJECTION FOR SENTINEL NODE IDENTIFICATION Left 10/29/2018    Procedure: INJECTION, FOR SENTINEL NODE IDENTIFICATION;  Surgeon: Triston Avendaño MD;  Location: [facility] OR [address] [place] [address];  Service: General;  Laterality: Left;    INSERTION OF BREAST IMPLANT Bilateral 10/29/2018    Procedure: INSERTION, BREAST IMPLANT;  Surgeon: Jatin Smith MD;  Location: [facility] OR [address] [place] [address];  Service: Plastics;  Laterality: Bilateral;    INSERTION OF TUNNELED CENTRAL VENOUS CATHETER (CVC) WITH SUBCUTANEOUS PORT Right 10/29/2018    Procedure:  [id number] with flouro;  Surgeon: Triston Avendaño MD;  Location: [facility] OR [address] [place] [address];  Service: General;  Laterality: Right;    MODIFIED RADICAL MASTECTOMY W/ AXILLARY LYMPH NODE DISSECTION Left 10/29/2018    Procedure: MASTECTOMY, MODIFIED RADICAL;  Surgeon: Triston Avendaño MD;  Location: [facility] OR [address] [place] [address];  Service: General;  Laterality: Left;    SENTINEL LYMPH NODE BIOPSY Left 10/29/2018    Procedure: BIOPSY, LYMPH NODE, SENTINEL;  Surgeon: Triston Avendaño MD;  Location: [facility] OR [address] [place] [address];  Service: General;  Laterality: Left;    THYROID SURGERY      THYROIDECTOMY, PARTIAL       Social History     Tobacco Use    Smoking status: Never     Passive exposure: Past    Smokeless tobacco: Never    Tobacco comments:     Father smoked during her childhood   Substance Use Topics    Alcohol use: No    Drug use: No     Family History   Problem Relation Name Age of Onset    Diabetes Mother      Asbestos Father late 50's     Cancer Father late 50's     Heart attack Father late 50's     Seizures Sister      Diabetes Brother      Heart failure Maternal Aunt      Colon cancer Maternal Uncle      Lung cancer Paternal Aunt      Lung cancer Paternal Uncle      Stroke Maternal Grandmother      Heart failure Maternal Grandmother      No Known Problems Maternal Grandfather      No Known Problems Paternal Grandmother      No Known Problems Paternal Grandfather      No Known Problems Sister      No Known Problems Sister       Review of patient's allergies indicates:  No Known Allergies       Review of Systems:  a review of eleven systems covering constitutional, Eye, HEENT, Psych, Respiratory, Cardiac, GI, , Musculoskeletal, Endocrine, Dermatologic was negative except for pertinent findings as listed ABOVE and below:  pertinent positives as above, rest good        Exam:Comprehensive exam done. BP (!) 154/84 (BP Location: Right arm, Patient Position: Sitting, BP Method: Small (Automatic)) Comment (BP Location): distal   "Pulse (!) 59   Ht 5' 6" (1.676 m)   Wt 115.2 kg (253 lb 15.5 oz)   SpO2 98% Comment: on room air at rest  BMI 40.99 kg/m²   Exam included Vitals as listed, and patient's appearance and affect and alertness and mood, oral exam for yeast and hygiene and pharynx lesions and Mallapatti (M) score, neck with inspection for jvd and masses and thyroid abnormalities and lymph nodes (supraclavicular and infraclavicular nodes and axillary also examined and noted if abn), chest exam included symmetry and effort and fremitus and percussion and auscultation, cardiac exam included rhythm and gallops and murmur and rubs and jvd and edema, abdominal exam for mass and hepatosplenomegaly and tenderness and hernias and bowel sounds, Musculoskeletal exam with muscle tone and posture and mobility/gait and  strength, and skin for rashes and cyanosis and pallor and turgor, extremity for clubbing.  Findings were normal except for pertinent findings listed below:  M3, chest is symmetric, no distress, normal percussion, normal fremitus and good normal breath sounds           Radiographs (ct chest and cxr) reviewed: view by direct vision  cxr 2022 nl, with port.  Ct head pansinusitis in 2019..    Labs reviewed           PFT was not done       Plan:  Clinical impression is apparently straight forward and impression with management as below.     Beatriz was seen today for cough.    Diagnoses and all orders for this visit:    Chronic cough  -     albuterol (PROVENTIL/VENTOLIN HFA) 90 mcg/actuation inhaler; Inhale 2 puffs into the lungs every 4 (four) hours as needed for Wheezing or Shortness of Breath.  -     fluticasone-umeclidin-vilanter (TRELEGY ELLIPTA) 200-62.5-25 mcg inhaler; Inhale 1 puff into the lungs once daily.  -     predniSONE (DELTASONE) 20 MG tablet; Take one daily for 3 days and may repeat for shortness of breath.    Cough variant asthma  -     albuterol (PROVENTIL/VENTOLIN HFA) 90 mcg/actuation inhaler; Inhale 2 puffs into " the lungs every 4 (four) hours as needed for Wheezing or Shortness of Breath.  -     fluticasone-umeclidin-vilanter (TRELEGY ELLIPTA) 200-62.5-25 mcg inhaler; Inhale 1 puff into the lungs once daily.  -     predniSONE (DELTASONE) 20 MG tablet; Take one daily for 3 days and may repeat for shortness of breath.    Chronic pansinusitis        Follow up in about 4 weeks (around 9/25/2024), or if symptoms worsen or fail to improve.    Discussed with patient above for education the following:      Patient Instructions   Chronic cough    Chronic sinuses with intermittent infections seem likely--  in 2019 er visit for ear pain ct head showed severe sinus infections    With wheeze, night time worsening, and associated with sinus infections -- expect cough variant asthma.   Use trelegy once daily,  use albuterol 2 puffs up to every 4 hrs as needed for cough, and use prednisone 20 mg daily for 3 days if still cough.  May consider special asthma testing    May consider ct sinuses as severe sinus infections seen ct head --     Reflux may contribute to cough?  Consider reflux with prilosec daily for 2 months if not clearing       May consider ct chest    Would recommend shingles vaccine once no steroids needed......         Follow up in 4-12 wks or so... later if clears....               aMrk took 48 min

## 2024-11-25 ENCOUNTER — OFFICE VISIT (OUTPATIENT)
Dept: PULMONOLOGY | Facility: CLINIC | Age: 69
End: 2024-11-25
Payer: MEDICARE

## 2024-11-25 VITALS
BODY MASS INDEX: 41.83 KG/M2 | OXYGEN SATURATION: 98 % | WEIGHT: 260.25 LBS | HEART RATE: 59 BPM | DIASTOLIC BLOOD PRESSURE: 82 MMHG | HEIGHT: 66 IN | SYSTOLIC BLOOD PRESSURE: 153 MMHG

## 2024-11-25 DIAGNOSIS — R05.3 CHRONIC COUGH: ICD-10-CM

## 2024-11-25 DIAGNOSIS — J45.991 COUGH VARIANT ASTHMA: ICD-10-CM

## 2024-11-25 DIAGNOSIS — J32.9 RECURRENT SINUS INFECTIONS: Primary | ICD-10-CM

## 2024-11-25 DIAGNOSIS — J45.50 SEVERE PERSISTENT ASTHMA, UNCOMPLICATED: ICD-10-CM

## 2024-11-25 PROCEDURE — 3079F DIAST BP 80-89 MM HG: CPT | Mod: CPTII,S$GLB,, | Performed by: INTERNAL MEDICINE

## 2024-11-25 PROCEDURE — 1101F PT FALLS ASSESS-DOCD LE1/YR: CPT | Mod: CPTII,S$GLB,, | Performed by: INTERNAL MEDICINE

## 2024-11-25 PROCEDURE — 3288F FALL RISK ASSESSMENT DOCD: CPT | Mod: CPTII,S$GLB,, | Performed by: INTERNAL MEDICINE

## 2024-11-25 PROCEDURE — 3008F BODY MASS INDEX DOCD: CPT | Mod: CPTII,S$GLB,, | Performed by: INTERNAL MEDICINE

## 2024-11-25 PROCEDURE — 99213 OFFICE O/P EST LOW 20 MIN: CPT | Mod: S$GLB,,, | Performed by: INTERNAL MEDICINE

## 2024-11-25 PROCEDURE — 3077F SYST BP >= 140 MM HG: CPT | Mod: CPTII,S$GLB,, | Performed by: INTERNAL MEDICINE

## 2024-11-25 PROCEDURE — 1159F MED LIST DOCD IN RCRD: CPT | Mod: CPTII,S$GLB,, | Performed by: INTERNAL MEDICINE

## 2024-11-25 PROCEDURE — 99999 PR PBB SHADOW E&M-EST. PATIENT-LVL III: CPT | Mod: PBBFAC,,, | Performed by: INTERNAL MEDICINE

## 2024-11-25 RX ORDER — TOBRAMYCIN AND DEXAMETHASONE 3; 1 MG/ML; MG/ML
SUSPENSION/ DROPS OPHTHALMIC
COMMUNITY
Start: 2024-11-21

## 2024-11-25 RX ORDER — MECLIZINE HCL 12.5 MG 12.5 MG/1
TABLET ORAL
COMMUNITY

## 2024-11-25 RX ORDER — AMOXICILLIN AND CLAVULANATE POTASSIUM 875; 125 MG/1; MG/1
1 TABLET, FILM COATED ORAL 2 TIMES DAILY
COMMUNITY
Start: 2024-11-21 | End: 2024-11-25 | Stop reason: SDUPTHER

## 2024-11-25 RX ORDER — PREDNISONE 20 MG/1
TABLET ORAL
Qty: 12 TABLET | Refills: 1 | Status: SHIPPED | OUTPATIENT
Start: 2024-11-25

## 2024-11-25 RX ORDER — AMOXICILLIN AND CLAVULANATE POTASSIUM 875; 125 MG/1; MG/1
1 TABLET, FILM COATED ORAL 2 TIMES DAILY
Qty: 20 TABLET | Refills: 2 | Status: SHIPPED | OUTPATIENT
Start: 2024-11-25

## 2024-11-25 NOTE — PATIENT INSTRUCTIONS
Trelegy should keep colds out of lungs-- asthma should be controlled.    Use prednisoen if asthma flares    Use augmentin for sinus/bronchial infection concerns--       Will check immune system - immunoglobulin levels, and protected

## 2024-11-25 NOTE — PROGRESS NOTES
11/25/2024    Beatriz Faust  New Patient Consult    Chief Complaint   Patient presents with    Follow-up    Shortness of Breath       HPI:    November 25, 2024-cough doing well, asthma doing well.  Having sinus  problems with pressure headaches/sinus.  Pt has had lingering sinus since October.      Pt on trelegy- no problems.  Had to use prednisone for sinuses.    Sees Dr Brooke for ent...      Never had pneumonia vaccine.       8/28/2024 pt had had persistent cough since Jan, had cxr ok. 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  occ wheeze   Has had ear infections -- onset this yr. No prior sinus problems-- but ct brain in 2019 hadpan sinusitis. Pet scan in 2018 had clear sinuses except for right maxillary cyst.   Cough comes from lungs.    Pt  was dosed abx for ear infection, was seen by Dr Thornton, ent.   Now on steroids  -- took steroids In feb and took steroids 2nd time   Patient Instructions   Chronic cough    Chronic sinuses with intermittent infections seem likely--  in 2019 er visit for ear pain ct head showed severe sinus infections    With wheeze, night time worsening, and associated with sinus infections -- expect cough variant asthma.   Use trelegy once daily,  use albuterol 2 puffs up to every 4 hrs as needed for cough, and use prednisone 20 mg daily for 3 days if still cough.  May consider special asthma testing    May consider ct sinuses as severe sinus infections seen ct head --     Reflux may contribute to cough?  Consider reflux with prilosec daily for 2 months if not clearing       May consider ct chest    Would recommend shingles vaccine once no steroids needed......         Follow up in 4-12 wks or so... later if clears....    PFSH:  Past Medical History:   Diagnosis Date    Ana's disease     Breast cancer 07/2018    left breast    Hypertension          Past Surgical History:   Procedure Laterality Date    AXILLARY NODE  DISSECTION Left 10/29/2018    Procedure: LYMPHADENECTOMY, AXILLARY;  Surgeon: Triston Avendaño MD;  [facility];  Service: General;  Laterality: Left;    BILATERAL MASTECTOMY Right 10/29/2018    Procedure: MASTECTOMY;  Surgeon: Triston Avendaño MD;  [facility];  Service: General;  Laterality: Right;    BREAST BIOPSY Left 07/2018    BREAST RECONSTRUCTION Bilateral 10/29/2018    Procedure: RECONSTRUCTION, BREAST;  Surgeon: Jatin Smith MD;  [facility];  Service: Plastics;  Laterality: Bilateral;    INJECTION FOR SENTINEL NODE IDENTIFICATION Left 10/29/2018    Procedure: INJECTION, FOR SENTINEL NODE IDENTIFICATION;  Surgeon: Triston Avendaño MD;  [facility];  Service: General;  Laterality: Left;    INSERTION OF BREAST IMPLANT Bilateral 10/29/2018    Procedure: INSERTION, BREAST IMPLANT;  Surgeon: Jatin Smith MD;  Location: [facility] OR [address] [place] [address];  Service: Plastics;  Laterality: Bilateral;    INSERTION OF TUNNELED CENTRAL VENOUS CATHETER (CVC) WITH SUBCUTANEOUS PORT Right 10/29/2018    Procedure: [id number] with flouro;  Surgeon: Triston Avendaño MD;  Location: [facility] OR [address] [place] [address];  Service: General;  Laterality: Right;    MODIFIED RADICAL MASTECTOMY W/ AXILLARY LYMPH NODE DISSECTION Left 10/29/2018    Procedure: MASTECTOMY, MODIFIED RADICAL;  Surgeon: Triston Avendaño MD;  Location: [facility] OR [address] [place] [address];  Service: General;  Laterality: Left;    SENTINEL LYMPH NODE BIOPSY Left 10/29/2018    Procedure: BIOPSY, LYMPH NODE, SENTINEL;  Surgeon: Triston Avendaño MD;  Location: [facility] OR [address] [place] [address];  Service: General;  Laterality: Left;    THYROID SURGERY      THYROIDECTOMY, PARTIAL       Social History     Tobacco Use    Smoking status: Never     Passive exposure: Past    Smokeless tobacco: Never    Tobacco comments:     Father smoked during her childhood   Substance Use Topics    Alcohol use: No    Drug use: No     Family History   Problem  "Relation Name Age of Onset    Diabetes Mother      Asbestos Father late 50's     Cancer Father late 50's     Heart attack Father late 50's     Seizures Sister      Diabetes Brother      Heart failure Maternal Aunt      Colon cancer Maternal Uncle      Lung cancer Paternal Aunt      Lung cancer Paternal Uncle      Stroke Maternal Grandmother      Heart failure Maternal Grandmother      No Known Problems Maternal Grandfather      No Known Problems Paternal Grandmother      No Known Problems Paternal Grandfather      No Known Problems Sister      No Known Problems Sister       Review of patient's allergies indicates:  No Known Allergies       Review of Systems:  a review of eleven systems covering constitutional, Eye, HEENT, Psych, Respiratory, Cardiac, GI, , Musculoskeletal, Endocrine, Dermatologic was negative except for pertinent findings as listed ABOVE and below:  pertinent positives as above, rest good        Exam:Comprehensive exam done. BP (!) 153/82 (BP Location: Right arm, Patient Position: Sitting)   Pulse (!) 59   Ht 5' 6" (1.676 m)   Wt 118.1 kg (260 lb 4.1 oz)   SpO2 98% Comment: on room air at rest  BMI 42.01 kg/m²   Exam included Vitals as listed, and patient's appearance and affect and alertness and mood, oral exam for yeast and hygiene and pharynx lesions and Mallapatti (M) score, neck with inspection for jvd and masses and thyroid abnormalities and lymph nodes (supraclavicular and infraclavicular nodes and axillary also examined and noted if abn), chest exam included symmetry and effort and fremitus and percussion and auscultation, cardiac exam included rhythm and gallops and murmur and rubs and jvd and edema, abdominal exam for mass and hepatosplenomegaly and tenderness and hernias and bowel sounds, Musculoskeletal exam with muscle tone and posture and mobility/gait and  strength, and skin for rashes and cyanosis and pallor and turgor, extremity for clubbing.  Findings were normal except " for pertinent findings listed below:  M3, chest is symmetric, no distress, normal percussion, normal fremitus and good normal breath sounds           Radiographs (ct chest and cxr) reviewed: view by direct vision  cxr 2022 nl, with port.  Ct head pansinusitis in 2019..    Labs reviewed           PFT was not done       Plan:  Clinical impression is apparently straight forward and impression with management as below.     Beatriz was seen today for follow-up and shortness of breath.    Diagnoses and all orders for this visit:    Recurrent sinus infections  -     amoxicillin-clavulanate 875-125mg (AUGMENTIN) 875-125 mg per tablet; Take 1 tablet by mouth 2 (two) times daily.  -     IgG; Future  -     IGM; Future  -     Humoral Immune Eval (Pneumo Serotypes) With H. Flu; Future  -     IGA; Future    Chronic cough  -     predniSONE (DELTASONE) 20 MG tablet; Take one daily for 3 days and may repeat for shortness of breath.    Cough variant asthma  -     predniSONE (DELTASONE) 20 MG tablet; Take one daily for 3 days and may repeat for shortness of breath.    Severe persistent asthma, uncomplicated  -     predniSONE (DELTASONE) 20 MG tablet; Take one daily for 3 days and may repeat for shortness of breath.          Follow up in about 1 year (around 11/25/2025), or if symptoms worsen or fail to improve.    Discussed with patient above for education the following:      Patient Instructions   Trelegy should keep colds out of lungs-- asthma should be controlled.    Use prednisoen if asthma flares    Use augmentin for sinus/bronchial infection concerns--       Will check immune system - immunoglobulin levels, and protected

## 2025-01-06 ENCOUNTER — PATIENT MESSAGE (OUTPATIENT)
Dept: GASTROENTEROLOGY | Facility: CLINIC | Age: 70
End: 2025-01-06
Payer: MEDICARE

## 2025-01-14 ENCOUNTER — TELEPHONE (OUTPATIENT)
Dept: HEMATOLOGY/ONCOLOGY | Facility: CLINIC | Age: 70
End: 2025-01-14
Payer: MEDICARE

## 2025-01-14 ENCOUNTER — TELEPHONE (OUTPATIENT)
Dept: HEMATOLOGY/ONCOLOGY | Facility: CLINIC | Age: 70
End: 2025-01-14

## 2025-03-07 ENCOUNTER — TELEPHONE (OUTPATIENT)
Dept: HEMATOLOGY/ONCOLOGY | Facility: CLINIC | Age: 70
End: 2025-03-07
Payer: MEDICARE

## 2025-03-07 NOTE — TELEPHONE ENCOUNTER
CALLED PT.   Moved appt to 4/17 at 2:30pm virtually.     Pt aware and thankful for call.       ----- Message from Cory Brantley NP sent at 3/6/2025  8:59 AM CST -----  I dont have anything that week. Does she want virtual?  ----- Message -----  From: Loyda Selby RN  Sent: 3/5/2025   3:04 PM CST  To: Cory Brantley NP    The only thing we have is a virtual visit for that week. Are you able to fit her in anywhere and I can give her a call  ----- Message -----  From: Sam Bates  Sent: 3/5/2025   2:09 PM CST  To: Fercho Coker    Name of Caller: Nature of Call:Requesting an ppt Best Call Back Number:[phone] Additional Information:Olive Zenon calling regarding Appointment Access. The pt is requesting an appt rescheduling during spring back. April 14 til April 18th. Please call back to assist

## 2025-04-17 ENCOUNTER — TELEPHONE (OUTPATIENT)
Dept: HEMATOLOGY/ONCOLOGY | Facility: CLINIC | Age: 70
End: 2025-04-17
Payer: MEDICARE

## 2025-04-17 NOTE — TELEPHONE ENCOUNTER
Called pt to advise to log on 15 min prior to virtual visit to complete E Precheck. No answer left message with call back number included.

## (undated) DEVICE — PACK UNIVERSAL SPLIT II

## (undated) DEVICE — SUT MONOCRYL 4-0 PS-2

## (undated) DEVICE — SYR DISP LL 5CC

## (undated) DEVICE — SUT ETHILON 5-0 18IN PLAIN

## (undated) DEVICE — SPONGE LAP 18X18 PREWASHED

## (undated) DEVICE — BLADE SURG CARBON STEEL #10

## (undated) DEVICE — SET FLUID TRANSFER ASEPTIC

## (undated) DEVICE — SPONGE DERMACEA GAUZE 4X4

## (undated) DEVICE — STAPLER SKIN ROTATING HEAD

## (undated) DEVICE — SUT ETHILON 2-0 PSLX 30IN

## (undated) DEVICE — BLADE SURG #15 CARBON STEEL

## (undated) DEVICE — SYR 50CC LL

## (undated) DEVICE — DRAIN CHANNEL ROUND 15FR

## (undated) DEVICE — SKINMARKER & RULER REGULAR X-F

## (undated) DEVICE — STOCKINET 4INX48

## (undated) DEVICE — SOL NACL 0.9% INJ PF/50151

## (undated) DEVICE — SUT 2-0 VICRYL / CT-1

## (undated) DEVICE — DRAPE STERI INSTRUMENT 1018

## (undated) DEVICE — SYR 30CC LUER LOCK

## (undated) DEVICE — SUT PDS II 2-0 CT1

## (undated) DEVICE — BOVIE SUCTION

## (undated) DEVICE — TRAY FOLEY 16FR INFECTION CONT

## (undated) DEVICE — SYS LABEL CORRECT MED

## (undated) DEVICE — DRAIN CHANNEL ROUND 19FR

## (undated) DEVICE — HOOK STAY ELAS 5MM 8EA/PK

## (undated) DEVICE — GOWN SURGICAL X-LARGE

## (undated) DEVICE — SYS PRINEO SKIN CLOSURE

## (undated) DEVICE — SUT SILK 2-0 PS 18IN BLACK

## (undated) DEVICE — GAUZE FLUFF XXLG 36X36 2 PLY

## (undated) DEVICE — PAD ABD 8X10 STERILE

## (undated) DEVICE — SUT MONOCRYL 3-0 PS-2 UND

## (undated) DEVICE — SEE MEDLINE ITEM 152622

## (undated) DEVICE — NDL SPINAL SPINOCAN 22GX3.5

## (undated) DEVICE — SEE MEDLINE ITEM 146417

## (undated) DEVICE — TRAY MINOR GEN SURG

## (undated) DEVICE — ELECTRODE REM PLYHSV RETURN 9

## (undated) DEVICE — EVACUATOR WOUND BULB 100CC

## (undated) DEVICE — SUT PROLENE 5-0 PS-2 18

## (undated) DEVICE — SEE MEDLINE ITEM 157128

## (undated) DEVICE — STAPLER SKIN REGULAR

## (undated) DEVICE — NDL 18GA X1 1/2 REG BEVEL

## (undated) DEVICE — SOL NS 1000CC

## (undated) DEVICE — NEOGUARD COVER 4X30CM STERILE

## (undated) DEVICE — DRAPE THYROID WITH ARMBOARD

## (undated) DEVICE — GOWN AERO CHROME W/ TOWEL XL

## (undated) DEVICE — ELECTRODE EXTENDED BLADE

## (undated) DEVICE — ELECTRODE BLADE INSULATED 1 IN

## (undated) DEVICE — GAUZE SPONGE 4X4 12PLY

## (undated) DEVICE — COVER PROBE NL STRL 3.6X96IN

## (undated) DEVICE — SUT MCRYL PLUS 4-0 PS2 27IN

## (undated) DEVICE — SEE MEDLINE ITEM 157131

## (undated) DEVICE — CUP MEDICINE STERILE 2OZ

## (undated) DEVICE — SEE MEDLINE ITEM 152512

## (undated) DEVICE — APPLIER CLIP LIAGCLIP 9.375IN

## (undated) DEVICE — DRESSING XEROFORM FOIL PK 1X8

## (undated) DEVICE — DRAPE C ARM 42 X 120 10/BX

## (undated) DEVICE — SUT 2-0 VICRYL / SH (J417)

## (undated) DEVICE — NDL HYPO REG 25G X 1 1/2

## (undated) DEVICE — SUT VICRYL 3-0 27 SH